# Patient Record
Sex: FEMALE | Race: WHITE | URBAN - METROPOLITAN AREA
[De-identification: names, ages, dates, MRNs, and addresses within clinical notes are randomized per-mention and may not be internally consistent; named-entity substitution may affect disease eponyms.]

---

## 2017-01-13 PROBLEM — C50.412 MALIGNANT NEOPLASM OF UPPER-OUTER QUADRANT OF LEFT FEMALE BREAST (HCC): Status: ACTIVE | Noted: 2017-01-13

## 2017-03-15 PROCEDURE — 81001 URINALYSIS AUTO W/SCOPE: CPT | Performed by: FAMILY MEDICINE

## 2017-03-16 PROCEDURE — 87086 URINE CULTURE/COLONY COUNT: CPT | Performed by: FAMILY MEDICINE

## 2017-06-06 PROBLEM — E78.2 MIXED HYPERLIPIDEMIA: Status: ACTIVE | Noted: 2017-06-06

## 2017-06-06 PROBLEM — R00.2 PALPITATION: Status: ACTIVE | Noted: 2017-06-06

## 2023-07-31 RX ORDER — ASCORBIC ACID 500 MG
500 TABLET ORAL DAILY
COMMUNITY

## 2023-07-31 RX ORDER — LORATADINE 10 MG/1
10 TABLET ORAL AS NEEDED
COMMUNITY

## 2023-07-31 RX ORDER — ACETAMINOPHEN 500 MG
500 TABLET ORAL EVERY 6 HOURS PRN
COMMUNITY

## 2023-08-03 ENCOUNTER — LAB ENCOUNTER (OUTPATIENT)
Dept: LAB | Age: 63
End: 2023-08-03
Attending: STUDENT IN AN ORGANIZED HEALTH CARE EDUCATION/TRAINING PROGRAM
Payer: COMMERCIAL

## 2023-08-03 DIAGNOSIS — Z01.818 PREOP TESTING: ICD-10-CM

## 2023-08-03 PROCEDURE — 86901 BLOOD TYPING SEROLOGIC RH(D): CPT

## 2023-08-03 PROCEDURE — 86900 BLOOD TYPING SEROLOGIC ABO: CPT

## 2023-08-03 PROCEDURE — 86850 RBC ANTIBODY SCREEN: CPT

## 2023-08-04 ENCOUNTER — HOSPITAL ENCOUNTER (OUTPATIENT)
Facility: HOSPITAL | Age: 63
Discharge: NURSING, REHAB OR LONG-TERM CARE | End: 2023-08-11
Attending: STUDENT IN AN ORGANIZED HEALTH CARE EDUCATION/TRAINING PROGRAM | Admitting: STUDENT IN AN ORGANIZED HEALTH CARE EDUCATION/TRAINING PROGRAM
Payer: COMMERCIAL

## 2023-08-04 ENCOUNTER — APPOINTMENT (OUTPATIENT)
Dept: GENERAL RADIOLOGY | Facility: HOSPITAL | Age: 63
End: 2023-08-04
Attending: STUDENT IN AN ORGANIZED HEALTH CARE EDUCATION/TRAINING PROGRAM
Payer: COMMERCIAL

## 2023-08-04 ENCOUNTER — ANESTHESIA (OUTPATIENT)
Dept: SURGERY | Facility: HOSPITAL | Age: 63
End: 2023-08-04
Payer: COMMERCIAL

## 2023-08-04 ENCOUNTER — ANESTHESIA EVENT (OUTPATIENT)
Dept: SURGERY | Facility: HOSPITAL | Age: 63
End: 2023-08-04
Payer: COMMERCIAL

## 2023-08-04 DIAGNOSIS — Z01.818 PREOP TESTING: Primary | ICD-10-CM

## 2023-08-04 LAB
ANION GAP SERPL CALC-SCNC: 4 MMOL/L (ref 0–18)
ANTIBODY SCREEN: NEGATIVE
BUN BLD-MCNC: 15 MG/DL (ref 7–18)
BUN/CREAT SERPL: 24.6 (ref 10–20)
CALCIUM BLD-MCNC: 9 MG/DL (ref 8.5–10.1)
CHLORIDE SERPL-SCNC: 111 MMOL/L (ref 98–112)
CO2 SERPL-SCNC: 26 MMOL/L (ref 21–32)
CREAT BLD-MCNC: 0.61 MG/DL
EGFRCR SERPLBLD CKD-EPI 2021: 100 ML/MIN/1.73M2 (ref 60–?)
EST. AVERAGE GLUCOSE BLD GHB EST-MCNC: 134 MG/DL (ref 68–126)
GLUCOSE BLD-MCNC: 161 MG/DL (ref 70–99)
GLUCOSE BLDC GLUCOMTR-MCNC: 118 MG/DL (ref 70–99)
GLUCOSE BLDC GLUCOMTR-MCNC: 138 MG/DL (ref 70–99)
GLUCOSE BLDC GLUCOMTR-MCNC: 156 MG/DL (ref 70–99)
GLUCOSE BLDC GLUCOMTR-MCNC: 180 MG/DL (ref 70–99)
GLUCOSE BLDC GLUCOMTR-MCNC: 210 MG/DL (ref 70–99)
HBA1C MFR BLD: 6.3 % (ref ?–5.7)
HCT VFR BLD AUTO: 36.4 %
HGB BLD-MCNC: 11.7 G/DL
OSMOLALITY SERPL CALC.SUM OF ELEC: 296 MOSM/KG (ref 275–295)
POTASSIUM SERPL-SCNC: 4.2 MMOL/L (ref 3.5–5.1)
RH BLOOD TYPE: POSITIVE
RH BLOOD TYPE: POSITIVE
SODIUM SERPL-SCNC: 141 MMOL/L (ref 136–145)

## 2023-08-04 PROCEDURE — 85014 HEMATOCRIT: CPT | Performed by: STUDENT IN AN ORGANIZED HEALTH CARE EDUCATION/TRAINING PROGRAM

## 2023-08-04 PROCEDURE — 82962 GLUCOSE BLOOD TEST: CPT

## 2023-08-04 PROCEDURE — 88311 DECALCIFY TISSUE: CPT | Performed by: STUDENT IN AN ORGANIZED HEALTH CARE EDUCATION/TRAINING PROGRAM

## 2023-08-04 PROCEDURE — 88305 TISSUE EXAM BY PATHOLOGIST: CPT | Performed by: STUDENT IN AN ORGANIZED HEALTH CARE EDUCATION/TRAINING PROGRAM

## 2023-08-04 PROCEDURE — 83036 HEMOGLOBIN GLYCOSYLATED A1C: CPT | Performed by: STUDENT IN AN ORGANIZED HEALTH CARE EDUCATION/TRAINING PROGRAM

## 2023-08-04 PROCEDURE — 76000 FLUOROSCOPY <1 HR PHYS/QHP: CPT | Performed by: STUDENT IN AN ORGANIZED HEALTH CARE EDUCATION/TRAINING PROGRAM

## 2023-08-04 PROCEDURE — 94660 CPAP INITIATION&MGMT: CPT

## 2023-08-04 PROCEDURE — 72170 X-RAY EXAM OF PELVIS: CPT | Performed by: STUDENT IN AN ORGANIZED HEALTH CARE EDUCATION/TRAINING PROGRAM

## 2023-08-04 PROCEDURE — 80048 BASIC METABOLIC PNL TOTAL CA: CPT | Performed by: STUDENT IN AN ORGANIZED HEALTH CARE EDUCATION/TRAINING PROGRAM

## 2023-08-04 PROCEDURE — 0SR904A REPLACEMENT OF RIGHT HIP JOINT WITH CERAMIC ON POLYETHYLENE SYNTHETIC SUBSTITUTE, UNCEMENTED, OPEN APPROACH: ICD-10-PCS | Performed by: STUDENT IN AN ORGANIZED HEALTH CARE EDUCATION/TRAINING PROGRAM

## 2023-08-04 PROCEDURE — 85018 HEMOGLOBIN: CPT | Performed by: STUDENT IN AN ORGANIZED HEALTH CARE EDUCATION/TRAINING PROGRAM

## 2023-08-04 DEVICE — PINNACLE GRIPTION ACETABULAR SHELL SECTOR 52MM OD
Type: IMPLANTABLE DEVICE | Site: HIP | Status: FUNCTIONAL
Brand: PINNACLE GRIPTION

## 2023-08-04 DEVICE — CERAMIC HEAD UPCHARGE: Type: IMPLANTABLE DEVICE

## 2023-08-04 DEVICE — BIOLOX DELTA CERAMIC FEMORAL HEAD +1.5 36MM DIA 12/14 TAPER
Type: IMPLANTABLE DEVICE | Site: HIP | Status: FUNCTIONAL
Brand: BIOLOX DELTA

## 2023-08-04 DEVICE — PINNACLE HIP SOLUTIONS ALTRX POLYETHYLENE ACETABULAR LINER NEUTRAL 36MM ID 52MM OD
Type: IMPLANTABLE DEVICE | Site: HIP | Status: FUNCTIONAL
Brand: PINNACLE ALTRX

## 2023-08-04 DEVICE — ACTIS DUOFIX HIP PROSTHESIS (FEMORAL STEM 12/14 TAPER CEMENTLESS SIZE 3 HIGH COLLAR)  CE
Type: IMPLANTABLE DEVICE | Site: HIP | Status: FUNCTIONAL
Brand: ACTIS

## 2023-08-04 DEVICE — PINNACLE CANCELLOUS BONE SCREW 6.5MM X 15MM
Type: IMPLANTABLE DEVICE | Site: HIP | Status: FUNCTIONAL
Brand: PINNACLE

## 2023-08-04 DEVICE — TOTAL HIP W/POR CUP & COCR HD: Type: IMPLANTABLE DEVICE

## 2023-08-04 DEVICE — PINNACLE CANCELLOUS BONE SCREW 6.5MM X 30MM
Type: IMPLANTABLE DEVICE | Site: HIP | Status: FUNCTIONAL
Brand: PINNACLE

## 2023-08-04 RX ORDER — DEXAMETHASONE SODIUM PHOSPHATE 4 MG/ML
VIAL (ML) INJECTION AS NEEDED
Status: DISCONTINUED | OUTPATIENT
Start: 2023-08-04 | End: 2023-08-04 | Stop reason: SURG

## 2023-08-04 RX ORDER — HYDROMORPHONE HYDROCHLORIDE 1 MG/ML
0.2 INJECTION, SOLUTION INTRAMUSCULAR; INTRAVENOUS; SUBCUTANEOUS EVERY 5 MIN PRN
Status: DISCONTINUED | OUTPATIENT
Start: 2023-08-04 | End: 2023-08-04 | Stop reason: HOSPADM

## 2023-08-04 RX ORDER — PROCHLORPERAZINE EDISYLATE 5 MG/ML
5 INJECTION INTRAMUSCULAR; INTRAVENOUS EVERY 8 HOURS PRN
Status: DISCONTINUED | OUTPATIENT
Start: 2023-08-04 | End: 2023-08-04 | Stop reason: HOSPADM

## 2023-08-04 RX ORDER — CEPHALEXIN 500 MG/1
500 CAPSULE ORAL EVERY 6 HOURS SCHEDULED
Status: DISCONTINUED | OUTPATIENT
Start: 2023-08-05 | End: 2023-08-11

## 2023-08-04 RX ORDER — SODIUM CHLORIDE, SODIUM LACTATE, POTASSIUM CHLORIDE, CALCIUM CHLORIDE 600; 310; 30; 20 MG/100ML; MG/100ML; MG/100ML; MG/100ML
INJECTION, SOLUTION INTRAVENOUS CONTINUOUS
Status: DISCONTINUED | OUTPATIENT
Start: 2023-08-04 | End: 2023-08-05

## 2023-08-04 RX ORDER — MORPHINE SULFATE 4 MG/ML
4 INJECTION, SOLUTION INTRAMUSCULAR; INTRAVENOUS EVERY 10 MIN PRN
Status: DISCONTINUED | OUTPATIENT
Start: 2023-08-04 | End: 2023-08-04 | Stop reason: HOSPADM

## 2023-08-04 RX ORDER — EPHEDRINE SULFATE 50 MG/ML
INJECTION INTRAVENOUS AS NEEDED
Status: DISCONTINUED | OUTPATIENT
Start: 2023-08-04 | End: 2023-08-04 | Stop reason: SURG

## 2023-08-04 RX ORDER — HYDROMORPHONE HYDROCHLORIDE 1 MG/ML
0.8 INJECTION, SOLUTION INTRAMUSCULAR; INTRAVENOUS; SUBCUTANEOUS EVERY 2 HOUR PRN
Status: DISCONTINUED | OUTPATIENT
Start: 2023-08-04 | End: 2023-08-06

## 2023-08-04 RX ORDER — FAMOTIDINE 20 MG/1
20 TABLET, FILM COATED ORAL ONCE
Status: DISCONTINUED | OUTPATIENT
Start: 2023-08-04 | End: 2023-08-04 | Stop reason: HOSPADM

## 2023-08-04 RX ORDER — CEFAZOLIN SODIUM/WATER 2 G/20 ML
2 SYRINGE (ML) INTRAVENOUS EVERY 8 HOURS
Status: COMPLETED | OUTPATIENT
Start: 2023-08-04 | End: 2023-08-05

## 2023-08-04 RX ORDER — LOSARTAN POTASSIUM 50 MG/1
50 TABLET ORAL DAILY
Status: DISCONTINUED | OUTPATIENT
Start: 2023-08-04 | End: 2023-08-11

## 2023-08-04 RX ORDER — ACETAMINOPHEN 500 MG
1000 TABLET ORAL EVERY 6 HOURS
Status: DISCONTINUED | OUTPATIENT
Start: 2023-08-04 | End: 2023-08-09

## 2023-08-04 RX ORDER — DEXTROSE MONOHYDRATE 25 G/50ML
50 INJECTION, SOLUTION INTRAVENOUS
Status: DISCONTINUED | OUTPATIENT
Start: 2023-08-04 | End: 2023-08-04 | Stop reason: HOSPADM

## 2023-08-04 RX ORDER — ONDANSETRON 2 MG/ML
4 INJECTION INTRAMUSCULAR; INTRAVENOUS EVERY 6 HOURS PRN
Status: DISCONTINUED | OUTPATIENT
Start: 2023-08-04 | End: 2023-08-11

## 2023-08-04 RX ORDER — SENNOSIDES 8.6 MG
17.2 TABLET ORAL NIGHTLY
Status: DISCONTINUED | OUTPATIENT
Start: 2023-08-04 | End: 2023-08-11

## 2023-08-04 RX ORDER — FAMOTIDINE 10 MG/ML
20 INJECTION, SOLUTION INTRAVENOUS 2 TIMES DAILY
Status: DISCONTINUED | OUTPATIENT
Start: 2023-08-04 | End: 2023-08-11

## 2023-08-04 RX ORDER — TRANEXAMIC ACID 10 MG/ML
INJECTION, SOLUTION INTRAVENOUS AS NEEDED
Status: DISCONTINUED | OUTPATIENT
Start: 2023-08-04 | End: 2023-08-04 | Stop reason: SURG

## 2023-08-04 RX ORDER — FAMOTIDINE 20 MG/1
20 TABLET, FILM COATED ORAL 2 TIMES DAILY
Status: DISCONTINUED | OUTPATIENT
Start: 2023-08-04 | End: 2023-08-11

## 2023-08-04 RX ORDER — ENEMA 19; 7 G/133ML; G/133ML
1 ENEMA RECTAL ONCE AS NEEDED
Status: DISCONTINUED | OUTPATIENT
Start: 2023-08-04 | End: 2023-08-11

## 2023-08-04 RX ORDER — KETOROLAC TROMETHAMINE 15 MG/ML
15 INJECTION, SOLUTION INTRAMUSCULAR; INTRAVENOUS EVERY 6 HOURS
Status: COMPLETED | OUTPATIENT
Start: 2023-08-04 | End: 2023-08-06

## 2023-08-04 RX ORDER — BUPIVACAINE HYDROCHLORIDE 7.5 MG/ML
INJECTION, SOLUTION INTRASPINAL
Status: COMPLETED | OUTPATIENT
Start: 2023-08-04 | End: 2023-08-04

## 2023-08-04 RX ORDER — POLYETHYLENE GLYCOL 3350 17 G/17G
17 POWDER, FOR SOLUTION ORAL DAILY PRN
Status: DISCONTINUED | OUTPATIENT
Start: 2023-08-04 | End: 2023-08-11

## 2023-08-04 RX ORDER — MORPHINE SULFATE 10 MG/ML
6 INJECTION, SOLUTION INTRAMUSCULAR; INTRAVENOUS EVERY 10 MIN PRN
Status: DISCONTINUED | OUTPATIENT
Start: 2023-08-04 | End: 2023-08-04 | Stop reason: HOSPADM

## 2023-08-04 RX ORDER — ACETAMINOPHEN 500 MG
1000 TABLET ORAL ONCE
Status: DISCONTINUED | OUTPATIENT
Start: 2023-08-04 | End: 2023-08-04

## 2023-08-04 RX ORDER — DIPHENHYDRAMINE HYDROCHLORIDE 50 MG/ML
25 INJECTION INTRAMUSCULAR; INTRAVENOUS ONCE AS NEEDED
Status: ACTIVE | OUTPATIENT
Start: 2023-08-04 | End: 2023-08-04

## 2023-08-04 RX ORDER — ACETAMINOPHEN 500 MG
1000 TABLET ORAL ONCE
Status: DISCONTINUED | OUTPATIENT
Start: 2023-08-04 | End: 2023-08-04 | Stop reason: HOSPADM

## 2023-08-04 RX ORDER — ONDANSETRON 2 MG/ML
INJECTION INTRAMUSCULAR; INTRAVENOUS AS NEEDED
Status: DISCONTINUED | OUTPATIENT
Start: 2023-08-04 | End: 2023-08-04 | Stop reason: SURG

## 2023-08-04 RX ORDER — HYDROMORPHONE HYDROCHLORIDE 1 MG/ML
0.4 INJECTION, SOLUTION INTRAMUSCULAR; INTRAVENOUS; SUBCUTANEOUS EVERY 2 HOUR PRN
Status: DISCONTINUED | OUTPATIENT
Start: 2023-08-04 | End: 2023-08-06

## 2023-08-04 RX ORDER — BISACODYL 10 MG
10 SUPPOSITORY, RECTAL RECTAL
Status: DISCONTINUED | OUTPATIENT
Start: 2023-08-04 | End: 2023-08-11

## 2023-08-04 RX ORDER — DIPHENHYDRAMINE HYDROCHLORIDE 50 MG/ML
12.5 INJECTION INTRAMUSCULAR; INTRAVENOUS EVERY 4 HOURS PRN
Status: DISCONTINUED | OUTPATIENT
Start: 2023-08-04 | End: 2023-08-11

## 2023-08-04 RX ORDER — DIPHENHYDRAMINE HCL 25 MG
25 CAPSULE ORAL EVERY 4 HOURS PRN
Status: DISCONTINUED | OUTPATIENT
Start: 2023-08-04 | End: 2023-08-11

## 2023-08-04 RX ORDER — NICOTINE POLACRILEX 4 MG
30 LOZENGE BUCCAL
Status: DISCONTINUED | OUTPATIENT
Start: 2023-08-04 | End: 2023-08-04 | Stop reason: HOSPADM

## 2023-08-04 RX ORDER — CELECOXIB 200 MG/1
400 CAPSULE ORAL ONCE
Status: COMPLETED | OUTPATIENT
Start: 2023-08-04 | End: 2023-08-04

## 2023-08-04 RX ORDER — ACETAMINOPHEN 500 MG
500 TABLET ORAL EVERY 4 HOURS PRN
Status: DISCONTINUED | OUTPATIENT
Start: 2023-08-04 | End: 2023-08-11

## 2023-08-04 RX ORDER — PROCHLORPERAZINE EDISYLATE 5 MG/ML
5 INJECTION INTRAMUSCULAR; INTRAVENOUS EVERY 8 HOURS PRN
Status: DISCONTINUED | OUTPATIENT
Start: 2023-08-04 | End: 2023-08-11

## 2023-08-04 RX ORDER — NICOTINE POLACRILEX 4 MG
15 LOZENGE BUCCAL
Status: DISCONTINUED | OUTPATIENT
Start: 2023-08-04 | End: 2023-08-04 | Stop reason: HOSPADM

## 2023-08-04 RX ORDER — NALOXONE HYDROCHLORIDE 0.4 MG/ML
80 INJECTION, SOLUTION INTRAMUSCULAR; INTRAVENOUS; SUBCUTANEOUS AS NEEDED
Status: DISCONTINUED | OUTPATIENT
Start: 2023-08-04 | End: 2023-08-04 | Stop reason: HOSPADM

## 2023-08-04 RX ORDER — DOCUSATE SODIUM 100 MG/1
100 CAPSULE, LIQUID FILLED ORAL 2 TIMES DAILY
Status: DISCONTINUED | OUTPATIENT
Start: 2023-08-04 | End: 2023-08-11

## 2023-08-04 RX ORDER — HYDROMORPHONE HYDROCHLORIDE 1 MG/ML
0.4 INJECTION, SOLUTION INTRAMUSCULAR; INTRAVENOUS; SUBCUTANEOUS EVERY 5 MIN PRN
Status: DISCONTINUED | OUTPATIENT
Start: 2023-08-04 | End: 2023-08-04 | Stop reason: HOSPADM

## 2023-08-04 RX ORDER — LIDOCAINE HYDROCHLORIDE 10 MG/ML
INJECTION, SOLUTION EPIDURAL; INFILTRATION; INTRACAUDAL; PERINEURAL AS NEEDED
Status: DISCONTINUED | OUTPATIENT
Start: 2023-08-04 | End: 2023-08-04 | Stop reason: SURG

## 2023-08-04 RX ORDER — LIDOCAINE HYDROCHLORIDE 10 MG/ML
INJECTION, SOLUTION INFILTRATION; PERINEURAL
Status: COMPLETED | OUTPATIENT
Start: 2023-08-04 | End: 2023-08-04

## 2023-08-04 RX ORDER — ANASTROZOLE 1 MG/1
1 TABLET ORAL DAILY
Status: DISCONTINUED | OUTPATIENT
Start: 2023-08-05 | End: 2023-08-08

## 2023-08-04 RX ORDER — CEFAZOLIN SODIUM/WATER 2 G/20 ML
2 SYRINGE (ML) INTRAVENOUS ONCE
Status: COMPLETED | OUTPATIENT
Start: 2023-08-04 | End: 2023-08-04

## 2023-08-04 RX ORDER — SODIUM CHLORIDE 9 MG/ML
INJECTION, SOLUTION INTRAVENOUS CONTINUOUS
Status: DISCONTINUED | OUTPATIENT
Start: 2023-08-04 | End: 2023-08-05

## 2023-08-04 RX ORDER — OXYCODONE HYDROCHLORIDE 5 MG/1
5 TABLET ORAL EVERY 4 HOURS PRN
Status: DISCONTINUED | OUTPATIENT
Start: 2023-08-04 | End: 2023-08-06

## 2023-08-04 RX ORDER — ROSUVASTATIN CALCIUM 20 MG/1
20 TABLET, COATED ORAL NIGHTLY
Status: DISCONTINUED | OUTPATIENT
Start: 2023-08-04 | End: 2023-08-11

## 2023-08-04 RX ORDER — ONDANSETRON 2 MG/ML
4 INJECTION INTRAMUSCULAR; INTRAVENOUS EVERY 6 HOURS PRN
Status: DISCONTINUED | OUTPATIENT
Start: 2023-08-04 | End: 2023-08-04 | Stop reason: HOSPADM

## 2023-08-04 RX ORDER — MORPHINE SULFATE 4 MG/ML
2 INJECTION, SOLUTION INTRAMUSCULAR; INTRAVENOUS EVERY 10 MIN PRN
Status: DISCONTINUED | OUTPATIENT
Start: 2023-08-04 | End: 2023-08-04 | Stop reason: HOSPADM

## 2023-08-04 RX ORDER — HYDROMORPHONE HYDROCHLORIDE 1 MG/ML
0.6 INJECTION, SOLUTION INTRAMUSCULAR; INTRAVENOUS; SUBCUTANEOUS EVERY 5 MIN PRN
Status: DISCONTINUED | OUTPATIENT
Start: 2023-08-04 | End: 2023-08-04 | Stop reason: HOSPADM

## 2023-08-04 RX ORDER — OXYCODONE HYDROCHLORIDE 5 MG/1
10 TABLET ORAL EVERY 4 HOURS PRN
Status: DISCONTINUED | OUTPATIENT
Start: 2023-08-04 | End: 2023-08-06

## 2023-08-04 RX ORDER — SODIUM CHLORIDE, SODIUM LACTATE, POTASSIUM CHLORIDE, CALCIUM CHLORIDE 600; 310; 30; 20 MG/100ML; MG/100ML; MG/100ML; MG/100ML
INJECTION, SOLUTION INTRAVENOUS CONTINUOUS
Status: DISCONTINUED | OUTPATIENT
Start: 2023-08-04 | End: 2023-08-04 | Stop reason: HOSPADM

## 2023-08-04 RX ORDER — LEVOTHYROXINE SODIUM 88 UG/1
88 TABLET ORAL EVERY MORNING
Status: DISCONTINUED | OUTPATIENT
Start: 2023-08-05 | End: 2023-08-11

## 2023-08-04 RX ADMIN — ONDANSETRON 4 MG: 2 INJECTION INTRAMUSCULAR; INTRAVENOUS at 11:13:00

## 2023-08-04 RX ADMIN — BUPIVACAINE HYDROCHLORIDE 1.5 ML: 7.5 INJECTION, SOLUTION INTRASPINAL at 11:13:00

## 2023-08-04 RX ADMIN — TRANEXAMIC ACID 1000 MG: 10 INJECTION, SOLUTION INTRAVENOUS at 11:42:00

## 2023-08-04 RX ADMIN — LIDOCAINE HYDROCHLORIDE 50 MG: 10 INJECTION, SOLUTION EPIDURAL; INFILTRATION; INTRACAUDAL; PERINEURAL at 11:13:00

## 2023-08-04 RX ADMIN — TRANEXAMIC ACID 1000 MG: 10 INJECTION, SOLUTION INTRAVENOUS at 13:18:00

## 2023-08-04 RX ADMIN — LIDOCAINE HYDROCHLORIDE 5 ML: 10 INJECTION, SOLUTION INFILTRATION; PERINEURAL at 11:13:00

## 2023-08-04 RX ADMIN — EPHEDRINE SULFATE 10 MG: 50 INJECTION INTRAVENOUS at 12:49:00

## 2023-08-04 RX ADMIN — SODIUM CHLORIDE, SODIUM LACTATE, POTASSIUM CHLORIDE, CALCIUM CHLORIDE: 600; 310; 30; 20 INJECTION, SOLUTION INTRAVENOUS at 11:13:00

## 2023-08-04 RX ADMIN — CEFAZOLIN SODIUM/WATER 2 G: 2 G/20 ML SYRINGE (ML) INTRAVENOUS at 11:42:00

## 2023-08-04 RX ADMIN — DEXAMETHASONE SODIUM PHOSPHATE 10 MG: 4 MG/ML VIAL (ML) INJECTION at 11:13:00

## 2023-08-04 NOTE — CM/SW NOTE
Per review of Dr. Ailyn Bright note from 6/20 pt wants SLADE as pt lives alone and has a lot of stairs. PT/OT eval pending. CM requested department  [facility]) to initiate [address] referral for [facility] and Isabellmaryu Josefina. SW/CM will continue to follow. Shonda Bright.  Wendy Bah RN, BSN  Nurse   [phone]

## 2023-08-04 NOTE — PHYSICAL THERAPY NOTE
PT orders received, chart reviewed. Attempted to see pt for PT, however pt occupied with RN staff at the time. Attempted later, pt in process of transfer to another room. Will attempt to see pt on 8/5/23. CM    Per RN, pt has been up ambulating in room and doing very well.

## 2023-08-04 NOTE — OPERATIVE REPORT
[facility] at [address]: 8/4/2023    PREOPERATIVE DIAGNOSIS:   Right Hip Osteoarthritis    POST-OPERATIVE DIAGNOSIS:   Righ Hip Osteoarthritis    PROCEDURE:  Right Total Hip Arthroplasty  Intra-operative Interpretation of Fluoroscopy    Location: [address]: Arash Meadows MD  Assistant(s): Mila Burton MD, Jeanie GARZON    Anesthesia type:  Spinal  Estimated Blood Loss: 300cc    Drains: None    Findings: Consistent with advanced degenerative joint disease    Specimen: Femoral head    Complications: None immediately apparent    Implants:  Acetabular Component: Depuy [place], size 52mm gription sector shell, 36mm neutral Polyethylene Liner, two x 6.5mm dome screws  Femoral Component: Press-fit size 3, high offset, Depuy Actis femoral component  Head: 36mm + 1.5 delta ceramic head. Indication: This is a 57year old lady, who presented with chronic hip pain refractory to non-operative treatment, and impairing activities of daily living. Radiographic evaluation demonstrated hip osteoarthritis . Surgical versus non-surgical options were discussed with the patient, and together we decided it is best to proceed with a total hip arthroplasty with the goals of pain relief and improvement in function. All risks and benefits of surgery including bleeding, infection, dislocation, amy-prosthetic fracture, neurovascular injury, leg length or offset discrepancy, as well as medical and anesthesia-related complications were discussed with the patient / family, who elected to proceed with surgery. Procedure in detail:    The patient was brought to the operating room, and underwent the above anesthesia. The patient was placed in a supine position with both feet secured in boots on the [place] table. The operative hip was sterilely prepped and draped in a usual orthopedic fashion.       A surgical pause was conducted to reconfirm the correct patient, site, side, and procedure to be performed. Perioperative antibiotics were given within one hour prior to the procedure. An approximately 8cm long incision was made beginning proximally 2 cm lateral and 2 cm distal to the anterior-superior iliac spine and extending distally toward the ipsilateral lateral epicondyle of the knee. Electrocautery was used to dissect through the subcutaneous tissue. The fascia overlying the TFL was incised in line with its fibers. A Lowery elevator was used to separate the fascia from the TFL. A finger then was placed along the superior femoral neck, and a cobra retractor was placed. A cerebellar retractor was placed distally between the rectus and the tensor muscle. Using electrocautery and a Schnidt tonsil clamp, the ascending branches of the lateral circumflex artery were ligated. The investing fascia over the capsule was divided, and another cobra retractor was placed inferior to the femoral neck. The pericapsular fat was then excised from around the anterior hip capsule. The anterior hip capsule was then incised with electrocautery starting at the edge of the acetabulum in line with the anterosuperior edge of the femoral neck and extending distally to the anterior intertrochanteric line, dividing the capsule at about a 135-degree angle. The medial and lateral capsule flaps were elevated off the of the proximal femur intertrochanteric line, and No. 5 Ethibond tagging sutures were placed in the both flaps of the capsule. A Hohmann retractor was placed posterosuperiorly over the acetabulum and the capsule was elevated a few millimeters off of the acetabular rim and ilium. The hip was externally rotated to 90 degrees, allowing an inferomedial split of the capsule. The hip was rotated back and a double pronged retractor was placed inferomedially instead of the cobra. The superior cobra was placed inside the capsule.  A kimberlee was made on the femoral neck in line with the planned femoral neck resection. A reciprocating saw was used to complete the femoral neck osteotomy. The hip was externally rotated to 70 degrees, and traction was applied. A spiral femoral hip screw was placed in the femoral head, and the femoral head was removed from the wound. A No. 1 retractor over the anterior wall of the acetabulum and the No. 2 retractor was placed postero-superiorly. This exposed the acetabulum. We then used electrocautery to excise the labrum and pulvinar. Fluoroscopy was utilized to obtain a perfect AP pelvis radiograph. We then started with a 51-mm reamer, and sequentially expanded the socket. The last reamer was a 52-mm reamer. Once appropriate reaming and positioning of the cup was determined with fluoroscopy, we then placed a final 52 mm acetabular shell in the appropriate degree of anteversion and abduction. There was an excellent press fit. Supplemental 6.5mm cancellous bone screws were drilled, measured and placed. The final  36 mm neutral polyethylene acetabular liner was impacted into place without difficulty. The liner was checked and noted to be well seated. Traction was released, and then we turned our attention to the femur. The femoral hook from the [place] [address] bed was placed around the vastus ridge. The leg was externally rotated 120 degrees. An asymmetric double pronged retractor was placed distally medially over the calcar, and the leg was then extended and adducted. A #1 retractor was placed posteriorly between the superolateral capsule and the gluteus minimus. The lateral capsule was then released off the femur, allowing for improved elevation of the femur. The conjoined was released but the obturator externus and piriformis tendons were not released. The hook was elevated, a double pronged retractor was placed over the greater trochanter and appropriate exposure of the femur was achieved. A box osteotome was used to identify the starting location.  A rongeur was used to remove posterolateral cortical bone to allow neutral broaching. The femoral canal was then broached sequentially to a size 3 stem. The broach was left in place, and a high offset neck with a 36-mm +1.5 trial head were placed. The leg was abducted and raised, and the hip was then reduced. Fluoroscopic imaging demonstrated appropriate leg length and offset. The hip was found to have appropriate soft tissue tension. Hip range of motion was tested and noted to be stable throughout. We then elected to implant these components. The hip was re-dislocated. The trial components were removed. The femur was exposed again, and the final size 3, high offset stem was impacted into place. The calcar was checked and noted to be intact without any evidence of fracture. A 36 mm + 1.5 femoral head was retrialed with appropriate soft tissue tension. This was the minimum length necessary for adequate stability. The final 36 mm + 1.5  femoral head was then impacted onto a cleaned/dried trunnion. The hip was then reduced. The wound was thoroughly irrigated with dilute betadine solution followed by normal saline. Pain cocktail injection was delivered to the soft tissues. The capsule was closed with No. 1 Vicryl interrupted sutures. The fascia over the TFL was closed with a running Quill suture. The subcutaneous tissues were then closed with 2-0 vicryl. The skin was closed with a running 3-0 monocryl suture. Dermabond was applied to the skin edges and a dry sterile dressing was placed. The patient was awakened from anesthesia and taken to the PACU in stable condition. There were no immediate complications. There was no qualified resident available to assist because qualified residents were assisting with other surgery. The PA assistant was necessary for help with positioning, draping, retraction, and wound closure.      POST-OPERATIVE PLAN  The patient will be permitted weight bearing as tolerated on the operative extremity  The patient will be permitted range of motion as tolerated  The patient will receive 24 hours of perioperative antibiotics. Venous thromboembolic prophylaxis will consist of early mobilization, mechanical compressive devices, and Eliquis  The dressing may be removed at 12 days post-operatively  The patient should be scheduled for follow up in 2 weeks for wound and radiographic evaluation.

## 2023-08-04 NOTE — ANESTHESIA PROCEDURE NOTES
Spinal Block    Date/Time: 8/4/2023 11:13 AM    Performed by: Rigoberto Diaz CRNA  Authorized by: Rigoberto Diaz CRNA      General Information and Staff    Start Time:  8/4/2023 11:13 AM  End Time:  8/4/2023 11:22 AM  CRNA:  Rigoberto Diaz CRNA  Performed by:  CRNA  Patient Location:  OR  Site identification: surface landmarks    Reason for Block: at surgeon's request and surgical anesthesia    Preanesthetic Checklist: patient identified, IV checked, risks and benefits discussed, monitors and equipment checked, pre-op evaluation, timeout performed, anesthesia consent and sterile technique used      Procedure Details    Patient Position:  Sitting  Prep: ChloraPrep and patient draped    Monitoring:  Continuous pulse ox and cardiac monitor  Approach:  Midline  Location:  L4-5  Injection Technique:  Single-shot    Needle    Needle Type:  Pencil-tip  Needle Gauge:  24 G    Assessment    Sensory Level:  T10  Events: clear CSF, CSF aspirated and well tolerated      Additional Comments

## 2023-08-04 NOTE — DISCHARGE INSTRUCTIONS
Resume anastrazole on 8/11. DISCHARGE INSTRUCTIONS:  PLACE ICE TO SURGICAL AREA 20-30 MINUTES ON/ 20-30 MINUTES OFF. THIS IS TO HELP WITH PAIN & SWELLING. TAKE YOUR MEDICATIONS AS PRESCRIBED BY YOUR DOCTOR BELOW. THESE HAVE BEEN SENT TO YOUR PHARMACY. IF YOU WERE INSTRUCTED BY PHYSICAL THERAPY TO EXERCISE HIP PRECAUTIONS, CONTINUE TO DO SO AFTER DISCHARGE    IT IS OK TO BEAR WEIGHT AS TOLERATED ON THE OPERATIVE EXTREMITY. CALL TO CONFIRM YOUR FOLLOW UP APPOINTMENT WITH DR. Keith Harrison TO BE SEEN IN 2-3 WEEKS POSTOP. [phone]    MEDICATIONS    POST OP MEDICATION REGIMEN              MULTI-MODAL   PAIN REGIMEN     DRUG   FOR   FREQUENCY & DURATION   QTY   NOTES     WEANING  TIPS       Gabapentin 100mg   Nerve pain   Take 2 tabs every 8 hours for 14 days    90   No refills You may discontinue this medication prior to 14 days if you do not tolerate it. Tramadol 50mg     Moderate pain   Take 1-2 tabs every 6 hours only as needed   45 May prescribe a refill, but ideally, this should be tapered off after surgery Stop using this medication 2nd   As pain decreases over time, increase the interval between doses (6 hours to 8, then 12, then 24) to taper off the medication. Meloxicam 15mg     Inflammation   Take 1 tab daily for 30 days     30   May refill if needed beyond 30 days   Recommend completing the 30 day supply. BREAKTHROUGH PAIN         Norco 5-325mg       Severe pain     Take 1 tabs every 4-6 hours only as needed for severe pain       40   Take at least 1 hr apart from Tramadol. Ideally, no refill. The goal is to taper off this medication as soon as possible, as it can be addictive and have side effects. Stop using this medication 1st if no longer having severe pain. BLOOD THINNER     Eliquis 2.5mg   Blood clot prevention   Take 1 tab twice daily for 30 days     60     No refills   Complete the entire course of your blood thinner.          ANTIBIOTIC Cefadroxil 500mg       Infection prevention     Take 1 tab twice daily for 7 days     14     No refills   Complete the entire course of your prophylactic antibiotic. STOOL SOFTENER     Sennokot 8.6/50mg   Constipation   Take 1 tab twice daily  while on opioids     60 Refer to discharge instructions if constipation persists even after taking this medication   Stop taking if having diarrhea or loose stools. ANTI-NAUSEA   Ondansetron 4mg   Nausea   Take 1 tab every 8 hours as needed if nauseous     20   No Refill      ANTI-ACID REFLUX / GASTRITIS   Pantoprazole 40mg   Acid reflux, gastric ulcer prophylaxis   Take 1 tab every day along with Meloxicam     60   May refill if Meloxicam refilled        DRESSING:    YOU HAVE A SPECIAL DRESSING CALLED AN AQUACEL DRESSING OVER YOUR INCISION. THE DRESSING STAYS FOR 12 DAYS FROM SURGERY. YOU MAY SHOWER AS SOON AS YOU RETURN HOME WITH THE AQUACEL DRESSING INTACT OVER YOUR INCISION AREA. IF THE DRESSING LEAKS OR COMES LOOSE BEFORE THE 7 DAY PERIOD CONTACT YOUR DOCTOR / SURGEON. AFTER   12 DAYS THE DRESSING IS REMOVED BY PULLING ON THE EDGE OF THE DRESSING AND GENTLY STRETCHING IT, THEN PEEL IT OFF SLOWLY LIKE A BANDAID. IF YOU HAVE ANY QUESTIONS OR CONCERNS ABOUT THE DRESSING CONTACT YOUR DOCTOR. IF DRAINAGE IS PRESENT AT ANYTIME FROM YOUR DRESSING OR FROM YOUR INCISION CALL YOUR DOCTOR / SURGEON AS SOON AS POSSIBLE. REASONS TO CALL YOUR DOCTOR:  TEMPERATURE .0 OR MORE  PERSISTANT NAUSEA OR VOMITTING - ESPECIALLY IF YOU ARE UNABLE TO EAT OR DRINK. INCREASED LEVEL OF PAIN OR PAIN WHICH IS NOT CONTROLLED BY YOUR PAIN MEDICINE. PERSISTENT DRAINAGE AT ANYTIME FROM YOUR SURGICAL AREA / INCISION. PAIN, TENDERNESS OR SUDDEN SWELLING IN YOUR CALF REGION OF THE LOWER EXTREMITIES - THIS IS A POSSIBLE SIGN OF A BLOOD CLOT. ANY CHANGES IN SENSATION IN YOUR BODY IN THE AREA OF YOUR SURGERY = NUMBNESS OR TINGLING.   ANY CHANGES IN CIRCULATION IN YOUR BODY IN THE AREA OF YOUR SURGERY = CHANGES  IN COLOR EITHER DARKER OR VERY PALE; OR  IF AREA FEELS COLD TO THE TOUCH AS COMPARED TO OTHER AREAS. ANY CHANGES IN FUNCTION IN YOUR BODY IN THE AREA OF YOUR SURGERY = CHANGE IN MOVEMENT - UNABLE TO MOVE OR WIGGLE FINGERS, TOES OR FOOT OR ANY OTHER CHANGES IN NORMAL BODY FUNCTIONING. FOR ANY OF THE ABOVE OR ANY OTHER QUESTIONS OR CONCERNS CALL YOUR DOCTOR/ SURGEON AS SOON AS POSSIBLE.       Travis Landau, MD MPH  Orthopaedic Surgeon, Adult Hip and Knee Reconstruction  [facility] at [facility] [address] [facility]  [place], [address] [place] [address]  Office: (L) [phone] (E) [phone]  Adminstrative Assistant: Jayant Mar

## 2023-08-05 LAB
ANION GAP SERPL CALC-SCNC: 6 MMOL/L (ref 0–18)
BUN BLD-MCNC: 14 MG/DL (ref 7–18)
BUN/CREAT SERPL: 24.1 (ref 10–20)
CALCIUM BLD-MCNC: 9.5 MG/DL (ref 8.5–10.1)
CHLORIDE SERPL-SCNC: 110 MMOL/L (ref 98–112)
CO2 SERPL-SCNC: 26 MMOL/L (ref 21–32)
CREAT BLD-MCNC: 0.58 MG/DL
DEPRECATED RDW RBC AUTO: 43.3 FL (ref 35.1–46.3)
EGFRCR SERPLBLD CKD-EPI 2021: 101 ML/MIN/1.73M2 (ref 60–?)
ERYTHROCYTE [DISTWIDTH] IN BLOOD BY AUTOMATED COUNT: 12.8 % (ref 11–15)
GLUCOSE BLD-MCNC: 160 MG/DL (ref 70–99)
GLUCOSE BLDC GLUCOMTR-MCNC: 133 MG/DL (ref 70–99)
GLUCOSE BLDC GLUCOMTR-MCNC: 134 MG/DL (ref 70–99)
GLUCOSE BLDC GLUCOMTR-MCNC: 151 MG/DL (ref 70–99)
GLUCOSE BLDC GLUCOMTR-MCNC: 157 MG/DL (ref 70–99)
HCT VFR BLD AUTO: 32.8 %
HGB BLD-MCNC: 10.7 G/DL
MCH RBC QN AUTO: 29.8 PG (ref 26–34)
MCHC RBC AUTO-ENTMCNC: 32.6 G/DL (ref 31–37)
MCV RBC AUTO: 91.4 FL
OSMOLALITY SERPL CALC.SUM OF ELEC: 298 MOSM/KG (ref 275–295)
PLATELET # BLD AUTO: 284 10(3)UL (ref 150–450)
POTASSIUM SERPL-SCNC: 4.3 MMOL/L (ref 3.5–5.1)
RBC # BLD AUTO: 3.59 X10(6)UL
SODIUM SERPL-SCNC: 142 MMOL/L (ref 136–145)
WBC # BLD AUTO: 11.9 X10(3) UL (ref 4–11)

## 2023-08-05 PROCEDURE — 97161 PT EVAL LOW COMPLEX 20 MIN: CPT

## 2023-08-05 PROCEDURE — 97530 THERAPEUTIC ACTIVITIES: CPT

## 2023-08-05 PROCEDURE — 82962 GLUCOSE BLOOD TEST: CPT

## 2023-08-05 PROCEDURE — 94799 UNLISTED PULMONARY SVC/PX: CPT

## 2023-08-05 PROCEDURE — 36415 COLL VENOUS BLD VENIPUNCTURE: CPT | Performed by: INTERNAL MEDICINE

## 2023-08-05 PROCEDURE — 80048 BASIC METABOLIC PNL TOTAL CA: CPT | Performed by: INTERNAL MEDICINE

## 2023-08-05 PROCEDURE — 85027 COMPLETE CBC AUTOMATED: CPT | Performed by: STUDENT IN AN ORGANIZED HEALTH CARE EDUCATION/TRAINING PROGRAM

## 2023-08-05 PROCEDURE — 97116 GAIT TRAINING THERAPY: CPT

## 2023-08-05 PROCEDURE — 97165 OT EVAL LOW COMPLEX 30 MIN: CPT

## 2023-08-05 PROCEDURE — 97535 SELF CARE MNGMENT TRAINING: CPT

## 2023-08-05 RX ORDER — OXYCODONE HYDROCHLORIDE 5 MG/1
5 TABLET ORAL EVERY 4 HOURS PRN
Qty: 40 TABLET | Refills: 0 | Status: SHIPPED | OUTPATIENT
Start: 2023-08-05 | End: 2023-08-06

## 2023-08-05 RX ORDER — TRAMADOL HYDROCHLORIDE 50 MG/1
TABLET ORAL EVERY 4 HOURS PRN
Qty: 40 TABLET | Refills: 0 | Status: SHIPPED | OUTPATIENT
Start: 2023-08-05

## 2023-08-05 NOTE — PHYSICAL THERAPY NOTE
PHYSICAL THERAPY EVALUATION - INPATIENT     Room Number: 433/433-A  Evaluation Date: 8/5/2023  Type of Evaluation: Initial   Physician Order: PT Eval and Treat    Presenting Problem: R ant hip  Reason for Therapy: Mobility Dysfunction and Discharge Planning    PHYSICAL THERAPY ASSESSMENT   Orders received and chart reviewed. RN approved participation in physical therapy. PPE worn by therapist: mask and gloves. Patient was wearing a mask during session. Patient is a 57year old female admitted 8/4/2023 for Presenting Problem: R ant hip. Patient presented in bedside chair with 4/10 pain. Education provided on Physical therapy plan of care, physiological benefits of out of bed mobility, and therex per post op HEP, gait training . Patient with fair carryover. Pt is very talkative and fearful about her postop recovery. Bed mobility: Min assist  Transfers: Min assist  Gait Assistance: Contact guard assist (valgus gait RLE)  Distance (ft):  (20x2) pt able to ambulate to/from bathroom and transfer off toilet  Assistive Device: Rolling walker             Patient was left in bedside chair at end of session with all needs in reach. Patient's current functional deficits include endurance. The patient's Approx Degree of Impairment: 50.57% has been calculated based on documentation in the [facility] '6 clicks' Inpatient Basic Mobility Short Form. Research supports that patients with this level of impairment may benefit from Subacute Rehab. RN aware of patient status post session. Patient will benefit from continued IP PT services to address these deficits in preparation for discharge. DISCHARGE RECOMMENDATIONS  PT Discharge Recommendations: Sub-acute rehabilitation    PLAN  PT Treatment Plan: Bed mobility; Family education;Gait training;Patient education;Transfer training  Rehab Potential : Good  Frequency (Obs): Daily       PHYSICAL THERAPY MEDICAL/SOCIAL HISTORY     History related to current admission:      Problem List  Active Problems:    Preop testing      Past Medical History  Past Medical History:   Diagnosis Date    Back problem     Breast cancer ([facility] [address]     left side    Depression     Diabetes ([address]     Essential hypertension     Exposure to medical diagnostic radiation     Glaucoma     Hyperthyroidism     IBS (irritable bowel syndrome)     Mixed hyperlipidemia 2017    Osteoarthritis     Palpitation 2017    Personal history of antineoplastic chemotherapy     PONV (postoperative nausea and vomiting)     Precordial pain     Sleep apnea     CPAP    Visual impairment     readers       Past Surgical History  Past Surgical History:   Procedure Laterality Date    BREAST BIOPSY Left     benign    CHOLECYSTECTOMY      COLONOSCOPY      MASTECTOMY MODIFIED RADICAL Bilateral     OTHER SURGICAL HISTORY      port was removed. HOME SITUATION  Type of Home: House   Home Layout: Multi-level  Stairs to Enter : 5  Railing: No  Stairs to Bedroom:  (10)     Lives With: Alone  Drives: Yes          Prior Level of [place]: ind    SUBJECTIVE  \"I need to try to walk\"    PHYSICAL THERAPY EXAMINATION     OBJECTIVE  Precautions: None (no hip precautions per ortho)  Fall Risk: Standard fall risk    WEIGHT BEARING RESTRICTION  Weight Bearing Restriction: R lower extremity        R Lower Extremity: Weight Bearing as Tolerated       PAIN ASSESSMENT  Ratin  Location:  (R hip)  Management Techniques: Repositioning    COGNITION  Overall Cognitive Status:  WFL - within functional limits    RANGE OF MOTION AND STRENGTH ASSESSMENT  Upper extremity ROM and strength are within functional limits     Lower extremity ROM is within functional limits     Lower extremity strength is within functional limits     BALANCE  Static Sitting: Fair  Dynamic Sitting: Fair  Static Standing: Fair -  Dynamic Standing: Poor +    A  AM-PAC '6-Clicks' INPATIENT SHORT FORM - BASIC MOBILITY  How much difficulty does the patient currently have. ..   Patient Difficulty: Turning over in bed (including adjusting bedclothes, sheets and blankets)?: A Little   Patient Difficulty: Sitting down on and standing up from a chair with arms (e.g., wheelchair, bedside commode, etc.): A Little   Patient Difficulty: Moving from lying on back to sitting on the side of the bed?: A Little   How much help from another person does the patient currently need. .. Help from Another: Moving to and from a bed to a chair (including a wheelchair)?: A Little   Help from Another: Need to walk in hospital room?: A Little   Help from Another: Climbing 3-5 steps with a railing?: A Lot     AM-PAC Score:  Raw Score: 17   Approx Degree of Impairment: 50.57%   Standardized Score (AM-PAC Scale): 42.13   CMS Modifier (G-Code): CK      Exercise/Education Provided:  Bed mobility  Gait training  Lower therapeutic exercise: Ankle pumps  Glut sets  Hip AB/AD  LAQ  Quad sets  Transfer training    Patient End of Session: Up in chair    CURRENT GOALS    Goals to be met by: 8/19  Patient Goal Patient's self-stated goal is: to go home   Goal #1 Patient is able to demonstrate supine - sit EOB @ level: supervision     Goal #1   Current Status    Goal #2 Patient is able to demonstrate transfers Sit to/from Stand at assistance level: supervision with walker - rolling     Goal #2  Current Status    Goal #3 Patient is able to ambulate 150 feet with assist device: walker - rolling at assistance level: supervision   Goal #3   Current Status    Goal #4 Patient will negotiate 3 stairs/one curb w/ assistive device and supervision   Goal #4   Current Status    Goal #5 Patient to demonstrate independence with home activity/exercise instructions provided to patient in preparation for discharge.    Goal #5   Current Status    Goal #6    Goal #6  Current Status      Patient Evaluation Complexity Level:  History Low - no personal factors and/or co-morbidities   Examination of body systems Low - addressing 1-2 elements   Clinical Presentation Low - Stable   Clinical Decision Making Low Complexity       Gait Training: 10 minutes  Therapeutic Activity: 10 minutes  Therapeutic Exercise: 5 minutes

## 2023-08-05 NOTE — CM/SW NOTE
PT recommendation: SNF, pt agreeable and would like to use [phone] zip code, unsure of [facility]. SW uploaded referral via [employer]. Will need facility acceptance, PASRR, and insurance auth. SW/CM to remain available for support and/or discharge planning.       DEAN Stock, [facility]  [employer] [facility]   IKF:#[id number]

## 2023-08-05 NOTE — PLAN OF CARE
Problem: Patient Centered Care  Goal: Patient preferences are identified and integrated in the patient's plan of care  Description: Interventions:  - What would you like us to know as we care for you? I live home alone  - Provide timely, complete, and accurate information to patient/family  - Incorporate patient and family knowledge, values, beliefs, and cultural backgrounds into the planning and delivery of care  - Encourage patient/family to participate in care and decision-making at the level they choose  - Honor patient and family perspectives and choices  Outcome: Progressing     Problem: Patient/Family Goals  Goal: Patient/Family Long Term Goal  Description: Patient's Long Term Goal: to regain strength    Interventions:  - rehab  - See additional Care Plan goals for specific interventions  Outcome: Progressing  Goal: Patient/Family Short Term Goal  Description: Patient's Short Term Goal: pain management     Interventions:   - toradol, tylenol  - See additional Care Plan goals for specific interventions  Outcome: Progressing     Problem: PAIN - ADULT  Goal: Verbalizes/displays adequate comfort level or patient's stated pain goal  Description: INTERVENTIONS:  - Encourage pt to monitor pain and request assistance  - Assess pain using appropriate pain scale  - Administer analgesics based on type and severity of pain and evaluate response  - Implement non-pharmacological measures as appropriate and evaluate response  - Consider cultural and social influences on pain and pain management  - Manage/alleviate anxiety  - Utilize distraction and/or relaxation techniques  - Monitor for opioid side effects  - Notify MD/LIP if interventions unsuccessful or patient reports new pain  - Anticipate increased pain with activity and pre-medicate as appropriate  Outcome: Progressing   POD 1. Patient a&ox4. RA and CPAP at night. SR on remote tele. Right hip dressing CDI. Up with assist and walker.  Awaiting PT eval. Scheduled tylenol and toradol as ordered for pain management. All needs met at this time.

## 2023-08-05 NOTE — PLAN OF CARE
Problem: PAIN - ADULT  Goal: Verbalizes/displays adequate comfort level or patient's stated pain goal  Description: INTERVENTIONS:  - Encourage pt to monitor pain and request assistance  - Assess pain using appropriate pain scale  - Administer analgesics based on type and severity of pain and evaluate response  - Implement non-pharmacological measures as appropriate and evaluate response  - Consider cultural and social influences on pain and pain management  - Manage/alleviate anxiety  - Utilize distraction and/or relaxation techniques  - Monitor for opioid side effects  - Notify MD/LIP if interventions unsuccessful or patient reports new pain  - Anticipate increased pain with activity and pre-medicate as appropriate  8/5/2023 1222 by Gya Villaseñor, RN  Outcome: Progressing  8/5/2023 1221 by Gay Villaseñor, RN  Outcome: Progressing     Problem: SAFETY ADULT - FALL  Goal: Free from fall injury  Description: INTERVENTIONS:  - Assess pt frequently for physical needs  - Identify cognitive and physical deficits and behaviors that affect risk of falls.   - [place] fall precautions as indicated by assessment.  - Educate pt/family on patient safety including physical limitations  - Instruct pt to call for assistance with activity based on assessment  - Modify environment to reduce risk of injury  - Provide assistive devices as appropriate  - Consider OT/PT consult to assist with strengthening/mobility  - Encourage toileting schedule  Outcome: Progressing     Problem: DISCHARGE PLANNING  Goal: Discharge to home or other facility with appropriate resources  Description: INTERVENTIONS:  - Identify barriers to discharge w/pt and caregiver  - Include patient/family/discharge partner in discharge planning  - Arrange for needed discharge resources and transportation as appropriate  - Identify discharge learning needs (meds, wound care, etc)  - Arrange for interpreters to assist at discharge as needed  - Consider post-discharge preferences of patient/family/discharge partner  - Complete POLST form as appropriate  - Assess patient's ability to be responsible for managing their own health  - Refer to Case Management Department for coordinating discharge planning if the patient needs post-hospital services based on physician/LIP order or complex needs related to functional status, cognitive ability or social support system  Outcome: Progressing     Problem: SKIN/TISSUE INTEGRITY - ADULT  Goal: Skin integrity remains intact  Description: INTERVENTIONS  - Assess and document risk factors for pressure ulcer development  - Assess and document skin integrity  - Monitor for areas of redness and/or skin breakdown  - Initiate interventions, skin care algorithm/standards of care as needed  Outcome: Progressing  Goal: Incision(s), wounds(s) or drain site(s) healing without S/S of infection  Description: INTERVENTIONS:  - Assess and document risk factors for pressure ulcer development  - Assess and document skin integrity  - Assess and document dressing/incision, wound bed, drain sites and surrounding tissue  - Implement wound care per orders  - Initiate isolation precautions as appropriate  - Initiate Pressure Ulcer prevention bundle as indicated  Outcome: Progressing     Problem: MUSCULOSKELETAL - ADULT  Goal: Return mobility to safest level of function  Description: INTERVENTIONS:  - Assess patient stability and activity tolerance for standing, transferring and ambulating w/ or w/o assistive devices  - Assist with transfers and ambulation using safe patient handling equipment as needed  - Ensure adequate protection for wounds/incisions during mobilization  - Obtain PT/OT consults as needed  - Advance activity as appropriate  - Communicate ordered activity level and limitations with patient/family  Outcome: Progressing  Goal: Maintain proper alignment of affected body part  Description: INTERVENTIONS:  - Support and protect limb and body alignment per provider's orders  - Instruct and reinforce with patient and family use of appropriate assistive device and precautions (e.g. spinal or hip dislocation precautions)  Outcome: Progressing     Problem: Impaired Functional Mobility  Goal: Achieve highest/safest level of mobility/gait  Description: Interventions:  - Assess patient's functional ability and stability  - Promote increasing activity/tolerance for mobility and gait  - Educate and engage patient/family in tolerated activity level and precautions    Outcome: Progressing   Pt worked with therapy and she is up in chair. Pain controlled with scheduled tylenol and Tramadol. Oxy  prn for break through pain. plan is to go to rehab. Call light with in reach and instructed to call for assistance.

## 2023-08-06 LAB
DEPRECATED RDW RBC AUTO: 44.3 FL (ref 35.1–46.3)
ERYTHROCYTE [DISTWIDTH] IN BLOOD BY AUTOMATED COUNT: 13.3 % (ref 11–15)
GLUCOSE BLDC GLUCOMTR-MCNC: 110 MG/DL (ref 70–99)
GLUCOSE BLDC GLUCOMTR-MCNC: 111 MG/DL (ref 70–99)
GLUCOSE BLDC GLUCOMTR-MCNC: 147 MG/DL (ref 70–99)
GLUCOSE BLDC GLUCOMTR-MCNC: 159 MG/DL (ref 70–99)
HCT VFR BLD AUTO: 28.5 %
HGB BLD-MCNC: 9.3 G/DL
MCH RBC QN AUTO: 29.8 PG (ref 26–34)
MCHC RBC AUTO-ENTMCNC: 32.6 G/DL (ref 31–37)
MCV RBC AUTO: 91.3 FL
PLATELET # BLD AUTO: 223 10(3)UL (ref 150–450)
RBC # BLD AUTO: 3.12 X10(6)UL
WBC # BLD AUTO: 8.4 X10(3) UL (ref 4–11)

## 2023-08-06 PROCEDURE — 82962 GLUCOSE BLOOD TEST: CPT

## 2023-08-06 PROCEDURE — 97110 THERAPEUTIC EXERCISES: CPT

## 2023-08-06 PROCEDURE — 97116 GAIT TRAINING THERAPY: CPT

## 2023-08-06 PROCEDURE — 85027 COMPLETE CBC AUTOMATED: CPT | Performed by: STUDENT IN AN ORGANIZED HEALTH CARE EDUCATION/TRAINING PROGRAM

## 2023-08-06 PROCEDURE — 94799 UNLISTED PULMONARY SVC/PX: CPT

## 2023-08-06 RX ORDER — HYDROCODONE BITARTRATE AND ACETAMINOPHEN 10; 325 MG/1; MG/1
1 TABLET ORAL EVERY 4 HOURS PRN
Status: DISCONTINUED | OUTPATIENT
Start: 2023-08-06 | End: 2023-08-09

## 2023-08-06 RX ORDER — HYDROCODONE BITARTRATE AND ACETAMINOPHEN 5; 325 MG/1; MG/1
1-2 TABLET ORAL EVERY 6 HOURS PRN
Qty: 40 TABLET | Refills: 0 | Status: SHIPPED | OUTPATIENT
Start: 2023-08-06

## 2023-08-06 NOTE — PLAN OF CARE
Problem: Patient Centered Care  Goal: Patient preferences are identified and integrated in the patient's plan of care  Description: Interventions:  - What would you like us to know as we care for you?   - Provide timely, complete, and accurate information to patient/family  - Incorporate patient and family knowledge, values, beliefs, and cultural backgrounds into the planning and delivery of care  - Encourage patient/family to participate in care and decision-making at the level they choose  - Honor patient and family perspectives and choices  Outcome: Progressing     Problem: Patient/Family Goals  Goal: Patient/Family Long Term Goal  Description: Patient's Long Term Goal:     Interventions:  - PT/OT  - Pain medication  - See additional Care Plan goals for specific interventions  Outcome: Progressing  Goal: Patient/Family Short Term Goal  Description: Patient's Short Term Goal:     Interventions:   - Pain medication  - PT/OT  - Monitor labs  - See additional Care Plan goals for specific interventions  Outcome: Progressing     Problem: PAIN - ADULT  Goal: Verbalizes/displays adequate comfort level or patient's stated pain goal  Description: INTERVENTIONS:  - Encourage pt to monitor pain and request assistance  - Assess pain using appropriate pain scale  - Administer analgesics based on type and severity of pain and evaluate response  - Implement non-pharmacological measures as appropriate and evaluate response  - Consider cultural and social influences on pain and pain management  - Manage/alleviate anxiety  - Utilize distraction and/or relaxation techniques  - Monitor for opioid side effects  - Notify MD/LIP if interventions unsuccessful or patient reports new pain  - Anticipate increased pain with activity and pre-medicate as appropriate  Outcome: Progressing     Problem: SAFETY ADULT - FALL  Goal: Free from fall injury  Description: INTERVENTIONS:  - Assess pt frequently for physical needs  - Identify cognitive and physical deficits and behaviors that affect risk of falls.   - [place] fall precautions as indicated by assessment.  - Educate pt/family on patient safety including physical limitations  - Instruct pt to call for assistance with activity based on assessment  - Modify environment to reduce risk of injury  - Provide assistive devices as appropriate  - Consider OT/PT consult to assist with strengthening/mobility  - Encourage toileting schedule  Outcome: Progressing     Problem: DISCHARGE PLANNING  Goal: Discharge to home or other facility with appropriate resources  Description: INTERVENTIONS:  - Identify barriers to discharge w/pt and caregiver  - Include patient/family/discharge partner in discharge planning  - Arrange for needed discharge resources and transportation as appropriate  - Identify discharge learning needs (meds, wound care, etc)  - Arrange for interpreters to assist at discharge as needed  - Consider post-discharge preferences of patient/family/discharge partner  - Complete POLST form as appropriate  - Assess patient's ability to be responsible for managing their own health  - Refer to Case Management Department for coordinating discharge planning if the patient needs post-hospital services based on physician/LIP order or complex needs related to functional status, cognitive ability or social support system  Outcome: Progressing     Problem: SKIN/TISSUE INTEGRITY - ADULT  Goal: Skin integrity remains intact  Description: INTERVENTIONS  - Assess and document risk factors for pressure ulcer development  - Assess and document skin integrity  - Monitor for areas of redness and/or skin breakdown  - Initiate interventions, skin care algorithm/standards of care as needed  Outcome: Progressing  Goal: Incision(s), wounds(s) or drain site(s) healing without S/S of infection  Description: INTERVENTIONS:  - Assess and document risk factors for pressure ulcer development  - Assess and document skin integrity  - Assess and document dressing/incision, wound bed, drain sites and surrounding tissue  - Implement wound care per orders  - Initiate isolation precautions as appropriate  - Initiate Pressure Ulcer prevention bundle as indicated  Outcome: Progressing     Problem: MUSCULOSKELETAL - ADULT  Goal: Return mobility to safest level of function  Description: INTERVENTIONS:  - Assess patient stability and activity tolerance for standing, transferring and ambulating w/ or w/o assistive devices  - Assist with transfers and ambulation using safe patient handling equipment as needed  - Ensure adequate protection for wounds/incisions during mobilization  - Obtain PT/OT consults as needed  - Advance activity as appropriate  - Communicate ordered activity level and limitations with patient/family  Outcome: Progressing  Goal: Maintain proper alignment of affected body part  Description: INTERVENTIONS:  - Support and protect limb and body alignment per provider's orders  - Instruct and reinforce with patient and family use of appropriate assistive device and precautions (e.g. spinal or hip dislocation precautions)  Outcome: Progressing     Problem: Impaired Functional Mobility  Goal: Achieve highest/safest level of mobility/gait  Description: Interventions:  - Assess patient's functional ability and stability  - Promote increasing activity/tolerance for mobility and gait  - Educate and engage patient/family in tolerated activity level and precautions  - Recommend use of  RW for transfers and ambulation  Outcome: Progressing     Gely Keating was resting comfortably in bed, able to ambulate x 1 assist and walker to the bathroom, and voiding freely. Her pain was managed with PRN Oxy and scheduled pain medications. Ice pack also applied to the hip. She's on tele, accucheck AC/HS, and CPAP at HS.  Plan for discharge to rehab pending insurance approval.

## 2023-08-06 NOTE — PLAN OF CARE
Himanshu Jose is post op day #2, S/P right hip. Hip dressing dry and intact. Neuro checks intact. Moderate P/D flexion. Smith is managed w Norco tab PRN. C/o nausea today after receiving OXY- zofran given and pain meds switched Norco. BM todayBG Ac/Hs- no insulin coverage required. Ice pack to hip PRN. Up w walker and SB assist. Planning rehab [facility] medically cleared- await insurance auth and PASS screening. Problem: PAIN - ADULT  Goal: Verbalizes/displays adequate comfort level or patient's stated pain goal  Description: INTERVENTIONS:  - Encourage pt to monitor pain and request assistance  - Assess pain using appropriate pain scale  - Administer analgesics based on type and severity of pain and evaluate response  - Implement non-pharmacological measures as appropriate and evaluate response  - Consider cultural and social influences on pain and pain management  - Manage/alleviate anxiety  - Utilize distraction and/or relaxation techniques  - Monitor for opioid side effects  - Notify MD/LIP if interventions unsuccessful or patient reports new pain  - Anticipate increased pain with activity and pre-medicate as appropriate  Outcome: Progressing     Problem: SAFETY ADULT - FALL  Goal: Free from fall injury  Description: INTERVENTIONS:  - Assess pt frequently for physical needs  - Identify cognitive and physical deficits and behaviors that affect risk of falls.   - [place] fall precautions as indicated by assessment.  - Educate pt/family on patient safety including physical limitations  - Instruct pt to call for assistance with activity based on assessment  - Modify environment to reduce risk of injury  - Provide assistive devices as appropriate  - Consider OT/PT consult to assist with strengthening/mobility  - Encourage toileting schedule  Outcome: Progressing     Problem: DISCHARGE PLANNING  Goal: Discharge to home or other facility with appropriate resources  Description: INTERVENTIONS:  - Identify barriers to discharge w/pt and caregiver  - Include patient/family/discharge partner in discharge planning  - Arrange for needed discharge resources and transportation as appropriate  - Identify discharge learning needs (meds, wound care, etc)  - Arrange for interpreters to assist at discharge as needed  - Consider post-discharge preferences of patient/family/discharge partner  - Complete POLST form as appropriate  - Assess patient's ability to be responsible for managing their own health  - Refer to Case Management Department for coordinating discharge planning if the patient needs post-hospital services based on physician/LIP order or complex needs related to functional status, cognitive ability or social support system  Outcome: Progressing     Problem: SKIN/TISSUE INTEGRITY - ADULT  Goal: Skin integrity remains intact  Description: INTERVENTIONS  - Assess and document risk factors for pressure ulcer development  - Assess and document skin integrity  - Monitor for areas of redness and/or skin breakdown  - Initiate interventions, skin care algorithm/standards of care as needed  Outcome: Progressing     Problem: MUSCULOSKELETAL - ADULT  Goal: Return mobility to safest level of function  Description: INTERVENTIONS:  - Assess patient stability and activity tolerance for standing, transferring and ambulating w/ or w/o assistive devices  - Assist with transfers and ambulation using safe patient handling equipment as needed  - Ensure adequate protection for wounds/incisions during mobilization  - Obtain PT/OT consults as needed  - Advance activity as appropriate  - Communicate ordered activity level and limitations with patient/family  Outcome: Progressing

## 2023-08-06 NOTE — PHYSICAL THERAPY NOTE
PHYSICAL THERAPY TREATMENT NOTE - INPATIENT     Room Number: 433/433-A       Presenting Problem: R ant hip    Problem List  Active Problems:    Preop testing      PHYSICAL THERAPY ASSESSMENT   Chart reviewed. RN  approved participation in physical therapy. PPE worn by therapist: mask, gloves, and goggles. Patient was wearing a mask during session. Patient presented in bed with 6/10 pain. Patient with good  progress towards goals during this session. Education provided on Physical therapy plan of care and physiological benefits of out of bed mobility. Patient with good carryover. Bed mobility: Min assist  Transfers: Min assist  Gait Assistance: Minimum assistance  Distance (ft): 2 x 40  Assistive Device: Rolling walker           Pt seen daily. Min a for bed mobility and transfer. Extra time provided to complete task. EOB sitting balance activity with emphasis on core stabilization. Pt educated on deep breathing. Pt amb 2 x 40 ft with RW and min a;provided cuing for gait pattern as well as for postural awareness. There ex. Patient was left in bedside chair at end of session with all needs in reach. The patient's Approx Degree of Impairment: 50.57% has been calculated based on documentation in the [facility] '6 clicks' Inpatient Basic Mobility Short Form. Research supports that patients with this level of impairment may benefit from Subacute Rehab. . RN aware of patient status post session. DISCHARGE RECOMMENDATIONS  PT Discharge Recommendations: Sub-acute rehabilitation     PLAN  PT Treatment Plan: Bed mobility; Endurance; Patient education;Gait training    SUBJECTIVE  Pt reports being ready for PT RX    OBJECTIVE  Precautions: None (no hip precautions per ortho)    WEIGHT BEARING RESTRICTION  Weight Bearing Restriction: R lower extremity        R Lower Extremity: Weight Bearing as Tolerated       PAIN ASSESSMENT   Ratin  Location:  (R hip)  Management Techniques: Repositioning    BALANCE Static Sitting: Fair  Dynamic Sitting: Fair           Static Standing: Fair -  Dynamic Standing: Poor +    ACTIVITY TOLERANCE                         O2 WALK       AM-PAC '6-Clicks' INPATIENT SHORT FORM - BASIC MOBILITY  How much difficulty does the patient currently have. .. Patient Difficulty: Turning over in bed (including adjusting bedclothes, sheets and blankets)?: A Little   Patient Difficulty: Sitting down on and standing up from a chair with arms (e.g., wheelchair, bedside commode, etc.): A Little   Patient Difficulty: Moving from lying on back to sitting on the side of the bed?: A Little   How much help from another person does the patient currently need. .. Help from Another: Moving to and from a bed to a chair (including a wheelchair)?: A Little   Help from Another: Need to walk in hospital room?: A Little   Help from Another: Climbing 3-5 steps with a railing?: A Lot     AM-PAC Score:  Raw Score: 17   Approx Degree of Impairment: 50.57%   Standardized Score (AM-PAC Scale): 42.13   CMS Modifier (G-Code): CK      Additional information:     THERAPEUTIC EXERCISES  Lower Extremity Ankle pumps  Glut sets  Quad sets     Position Supine       Patient End of Session: Up in chair;Call light within reach;RN aware of session/findings; All patient questions and concerns addressed    CURRENT GOALS     Patient Goal Patient's self-stated goal is: to go home   Goal #1 Patient is able to demonstrate supine - sit EOB @ level: supervision     Goal #1   Current Status Min a   Goal #2 Patient is able to demonstrate transfers Sit to/from Stand at assistance level: supervision with walker - rolling     Goal #2  Current Status Min a   Goal #3 Patient is able to ambulate 150 feet with assist device: walker - rolling at assistance level: supervision   Goal #3   Current Status Pt amb 2 x 40 ft with RW and Min a   Goal #4 Patient will negotiate 3 stairs/one curb w/ assistive device and supervision   Goal #4   Current Status NT   Goal #5 Patient to demonstrate independence with home activity/exercise instructions provided to patient in preparation for discharge. Goal #5   Current Status In progress   Goal #6    Goal #6  Current Status    Gait-15 minutes;   There ex-15 minutes

## 2023-08-07 LAB
DEPRECATED RDW RBC AUTO: 44.7 FL (ref 35.1–46.3)
ERYTHROCYTE [DISTWIDTH] IN BLOOD BY AUTOMATED COUNT: 13.2 % (ref 11–15)
GLUCOSE BLDC GLUCOMTR-MCNC: 114 MG/DL (ref 70–99)
GLUCOSE BLDC GLUCOMTR-MCNC: 129 MG/DL (ref 70–99)
GLUCOSE BLDC GLUCOMTR-MCNC: 133 MG/DL (ref 70–99)
GLUCOSE BLDC GLUCOMTR-MCNC: 143 MG/DL (ref 70–99)
HCT VFR BLD AUTO: 28.5 %
HGB BLD-MCNC: 9.1 G/DL
MCH RBC QN AUTO: 29.3 PG (ref 26–34)
MCHC RBC AUTO-ENTMCNC: 31.9 G/DL (ref 31–37)
MCV RBC AUTO: 91.6 FL
PLATELET # BLD AUTO: 246 10(3)UL (ref 150–450)
RBC # BLD AUTO: 3.11 X10(6)UL
WBC # BLD AUTO: 8.6 X10(3) UL (ref 4–11)

## 2023-08-07 PROCEDURE — 97110 THERAPEUTIC EXERCISES: CPT

## 2023-08-07 PROCEDURE — 85027 COMPLETE CBC AUTOMATED: CPT | Performed by: STUDENT IN AN ORGANIZED HEALTH CARE EDUCATION/TRAINING PROGRAM

## 2023-08-07 PROCEDURE — 94799 UNLISTED PULMONARY SVC/PX: CPT

## 2023-08-07 PROCEDURE — 82962 GLUCOSE BLOOD TEST: CPT

## 2023-08-07 RX ORDER — CEPHALEXIN 500 MG/1
500 CAPSULE ORAL EVERY 6 HOURS SCHEDULED
Qty: 16 CAPSULE | Refills: 0 | Status: SHIPPED | OUTPATIENT
Start: 2023-08-07 | End: 2023-08-11

## 2023-08-07 NOTE — PHYSICAL THERAPY NOTE
PHYSICAL THERAPY TREATMENT NOTE - INPATIENT     Room Number: 433/433-A       Presenting Problem: R ant hip    Problem List  Active Problems:    Preop testing      PHYSICAL THERAPY ASSESSMENT   Chart reviewed. RN  approved participation in physical therapy. PPE worn by therapist: mask, gloves, and goggles. Patient was wearing a mask during session. Patient presented in bed with 6/10 pain. Patient with good  progress towards goals during this session. Education provided on Physical therapy plan of care and physiological benefits of out of bed mobility. Patient with good carryover. Bed mobility: Min assist  Transfers: Min assist  Gait Assistance: Minimum assistance  Distance (ft): 2 x 40  Assistive Device: Rolling walker  Pattern: R Decreased stance time; Shuffle        Pt seen daily. Min a for bed mobility and transfer. Extra time provided to complete task. EOB sitting balance activity with emphasis on core stabilization. Pt educated on deep breathing. Hip precautions reviewed;education;Pt recall 2/3 hip precautions. Pt amb 2 x 40 ft with RW and min a;provided cuing for gait pattern as well as for postural awareness. Poor pain control is a limiting factor in pt progress. There ex. Patient was left in bedside chair at end of session with all needs in reach. The patient's Approx Degree of Impairment: 50.57% has been calculated based on documentation in the [facility] '6 clicks' Inpatient Basic Mobility Short Form. Research supports that patients with this level of impairment may benefit from Subacute Rehab. . RN aware of patient status post session. DISCHARGE RECOMMENDATIONS  PT Discharge Recommendations: Sub-acute rehabilitation     PLAN  PT Treatment Plan: Bed mobility; Body mechanics; Endurance    SUBJECTIVE  Pt reports being ready for PT RX    OBJECTIVE  Precautions: None (no hip precautions per ortho)    WEIGHT BEARING RESTRICTION  Weight Bearing Restriction: R lower extremity        R Lower Extremity: Weight Bearing as Tolerated       PAIN ASSESSMENT   Ratin  Location:  (R hip)  Management Techniques: Repositioning    BALANCE                                                                                                                       Static Sitting: Fair  Dynamic Sitting: Fair           Static Standing: Fair -  Dynamic Standing: Poor +    ACTIVITY TOLERANCE                         O2 WALK       AM-PAC '6-Clicks' INPATIENT SHORT FORM - BASIC MOBILITY  How much difficulty does the patient currently have. .. Patient Difficulty: Turning over in bed (including adjusting bedclothes, sheets and blankets)?: A Little   Patient Difficulty: Sitting down on and standing up from a chair with arms (e.g., wheelchair, bedside commode, etc.): A Little   Patient Difficulty: Moving from lying on back to sitting on the side of the bed?: A Little   How much help from another person does the patient currently need. .. Help from Another: Moving to and from a bed to a chair (including a wheelchair)?: A Little   Help from Another: Need to walk in hospital room?: A Little   Help from Another: Climbing 3-5 steps with a railing?: A Lot     AM-PAC Score:  Raw Score: 17   Approx Degree of Impairment: 50.57%   Standardized Score (AM-PAC Scale): 42.13   CMS Modifier (G-Code): CK      Additional information:     THERAPEUTIC EXERCISES  Lower Extremity Ankle pumps  Glut sets  Quad sets     Position Supine       Patient End of Session: Up in chair;Call light within reach;RN aware of session/findings; All patient questions and concerns addressed    CURRENT GOALS     Patient Goal Patient's self-stated goal is: to go home   Goal #1 Patient is able to demonstrate supine - sit EOB @ level: supervision     Goal #1   Current Status Min a   Goal #2 Patient is able to demonstrate transfers Sit to/from Stand at assistance level: supervision with walker - rolling     Goal #2  Current Status Min a   Goal #3 Patient is able to ambulate 150 feet with assist device: walker - rolling at assistance level: supervision   Goal #3   Current Status Pt amb 2 x 40 ft with RW and Min a   Goal #4 Patient will negotiate 3 stairs/one curb w/ assistive device and supervision   Goal #4   Current Status NT   Goal #5 Patient to demonstrate independence with home activity/exercise instructions provided to patient in preparation for discharge. Goal #5   Current Status In progress   Goal #6    Goal #6  Current Status    Gait-15 minutes;   There ex-15 minutes

## 2023-08-07 NOTE — PLAN OF CARE
Wilton Vidales is post op day #3  S/P right hip. Aquacel Hip dressing dry and intact. Neuro checks intact. Moderate P/D flexion. Smith is managed w Norco tab PRN. C/o nausea today medicated w Zofran x1 and compazine x1 dos. BM yesterday. BG Ac/Hs- no insulin coverage required. Ice pack to hip PRN. Up w walker and SB assist. Planning rehab when medically cleared- await insurance auth. Problem: PAIN - ADULT  Goal: Verbalizes/displays adequate comfort level or patient's stated pain goal  Description: INTERVENTIONS:  - Encourage pt to monitor pain and request assistance  - Assess pain using appropriate pain scale  - Administer analgesics based on type and severity of pain and evaluate response  - Implement non-pharmacological measures as appropriate and evaluate response  - Consider cultural and social influences on pain and pain management  - Manage/alleviate anxiety  - Utilize distraction and/or relaxation techniques  - Monitor for opioid side effects  - Notify MD/LIP if interventions unsuccessful or patient reports new pain  - Anticipate increased pain with activity and pre-medicate as appropriate  Outcome: Progressing     Problem: SAFETY ADULT - FALL  Goal: Free from fall injury  Description: INTERVENTIONS:  - Assess pt frequently for physical needs  - Identify cognitive and physical deficits and behaviors that affect risk of falls.   - [place] fall precautions as indicated by assessment.  - Educate pt/family on patient safety including physical limitations  - Instruct pt to call for assistance with activity based on assessment  - Modify environment to reduce risk of injury  - Provide assistive devices as appropriate  - Consider OT/PT consult to assist with strengthening/mobility  - Encourage toileting schedule  Outcome: Progressing     Problem: DISCHARGE PLANNING  Goal: Discharge to home or other facility with appropriate resources  Description: INTERVENTIONS:  - Identify barriers to discharge w/pt and caregiver  - Include patient/family/discharge partner in discharge planning  - Arrange for needed discharge resources and transportation as appropriate  - Identify discharge learning needs (meds, wound care, etc)  - Arrange for interpreters to assist at discharge as needed  - Consider post-discharge preferences of patient/family/discharge partner  - Complete POLST form as appropriate  - Assess patient's ability to be responsible for managing their own health  - Refer to Case Management Department for coordinating discharge planning if the patient needs post-hospital services based on physician/LIP order or complex needs related to functional status, cognitive ability or social support system  Outcome: Progressing     Problem: SKIN/TISSUE INTEGRITY - ADULT  Goal: Skin integrity remains intact  Description: INTERVENTIONS  - Assess and document risk factors for pressure ulcer development  - Assess and document skin integrity  - Monitor for areas of redness and/or skin breakdown  - Initiate interventions, skin care algorithm/standards of care as needed  Outcome: Progressing     Problem: MUSCULOSKELETAL - ADULT  Goal: Return mobility to safest level of function  Description: INTERVENTIONS:  - Assess patient stability and activity tolerance for standing, transferring and ambulating w/ or w/o assistive devices  - Assist with transfers and ambulation using safe patient handling equipment as needed  - Ensure adequate protection for wounds/incisions during mobilization  - Obtain PT/OT consults as needed  - Advance activity as appropriate  - Communicate ordered activity level and limitations with patient/family  Outcome: Progressing     Problem: Impaired Functional Mobility  Goal: Achieve highest/safest level of mobility/gait  Description: Interventions:  - Assess patient's functional ability and stability  - Promote increasing activity/tolerance for mobility and gait  - Educate and engage patient/family in tolerated activity level and precautions    Outcome: Progressing

## 2023-08-07 NOTE — CM/SW NOTE
Department  asked to send updates to [facility] referral for [facility]. Assigned SW/CM to follow up with patient/family on discharge plan.      Hermelinda Cintron   August 07, 2023   09:14

## 2023-08-07 NOTE — CM/SW NOTE
08/07/23 1000   CM/SW Referral Data   Referral Source Physician   Reason for Referral Discharge planning   Informant Patient   Medical Hx   Does patient have an established PCP? Yes   Significant Past Medical/Mental Health Hx RT total hip- Dr. Pop Gilmore   Patient Info   Patient's Current Mental Status at Time of Assessment Alert;Oriented   Patient's [address]   Number of Levels in Home 2   Number of Stair in Home 15 up to bed room   Patient lives with Alone   Patient Status Prior to Admission   Independent with ADLs and Mobility Yes   Services in place prior to admission DME/Supplies at home   Type of DME/Supplies CPAP   Discharge Needs   Anticipated D/C needs Subacute rehab   Services Requested   PASRR Level 1 Submitted Yes   Choice of Post-Acute Provider   Informed patient of right to choose their preferred provider Yes   List of appropriate post-acute services provided to patient/family with quality data Yes   Patient/family choice [facility]     Met with pt at bedside. Discussed [facility] and provided [facility] list.   Pt is from home and lives alone. Pt states that she has a lot of stairs at home and agrees to [facility] on dc. Pt has no hx of SLADE or HH. [facility] list given. Pt has chosen to rehab at [facility]. [facility] reserved via [facility] and notified to begin ins auth. PASRR queued for review. Plan: Georgina heller pending ins auth. / to remain available for support and/or discharge planning. Nitza Grajeda.  Dusty Peabody RN, BSN  Nurse   [phone]

## 2023-08-07 NOTE — PLAN OF CARE
Problem: Patient Centered Care  Goal: Patient preferences are identified and integrated in the patient's plan of care  Description: Interventions:  - What would you like us to know as we care for you?   - Provide timely, complete, and accurate information to patient/family  - Incorporate patient and family knowledge, values, beliefs, and cultural backgrounds into the planning and delivery of care  - Encourage patient/family to participate in care and decision-making at the level they choose  - Honor patient and family perspectives and choices  Outcome: Progressing     Problem: Patient/Family Goals  Goal: Patient/Family Long Term Goal  Description: Patient's Long Term Goal:     Interventions:  - PT/OT  - Pain medication  - See additional Care Plan goals for specific interventions  Outcome: Progressing  Goal: Patient/Family Short Term Goal  Description: Patient's Short Term Goal:     Interventions:   - PT/OT  - Pain medication  - Monitor labs  - See additional Care Plan goals for specific interventions  Outcome: Progressing     Problem: PAIN - ADULT  Goal: Verbalizes/displays adequate comfort level or patient's stated pain goal  Description: INTERVENTIONS:  - Encourage pt to monitor pain and request assistance  - Assess pain using appropriate pain scale  - Administer analgesics based on type and severity of pain and evaluate response  - Implement non-pharmacological measures as appropriate and evaluate response  - Consider cultural and social influences on pain and pain management  - Manage/alleviate anxiety  - Utilize distraction and/or relaxation techniques  - Monitor for opioid side effects  - Notify MD/LIP if interventions unsuccessful or patient reports new pain  - Anticipate increased pain with activity and pre-medicate as appropriate  Outcome: Progressing     Problem: SAFETY ADULT - FALL  Goal: Free from fall injury  Description: INTERVENTIONS:  - Assess pt frequently for physical needs  - Identify cognitive and physical deficits and behaviors that affect risk of falls.   - [place] fall precautions as indicated by assessment.  - Educate pt/family on patient safety including physical limitations  - Instruct pt to call for assistance with activity based on assessment  - Modify environment to reduce risk of injury  - Provide assistive devices as appropriate  - Consider OT/PT consult to assist with strengthening/mobility  - Encourage toileting schedule  Outcome: Progressing     Problem: DISCHARGE PLANNING  Goal: Discharge to home or other facility with appropriate resources  Description: INTERVENTIONS:  - Identify barriers to discharge w/pt and caregiver  - Include patient/family/discharge partner in discharge planning  - Arrange for needed discharge resources and transportation as appropriate  - Identify discharge learning needs (meds, wound care, etc)  - Arrange for interpreters to assist at discharge as needed  - Consider post-discharge preferences of patient/family/discharge partner  - Complete POLST form as appropriate  - Assess patient's ability to be responsible for managing their own health  - Refer to Case Management Department for coordinating discharge planning if the patient needs post-hospital services based on physician/LIP order or complex needs related to functional status, cognitive ability or social support system  Outcome: Progressing     Problem: SKIN/TISSUE INTEGRITY - ADULT  Goal: Skin integrity remains intact  Description: INTERVENTIONS  - Assess and document risk factors for pressure ulcer development  - Assess and document skin integrity  - Monitor for areas of redness and/or skin breakdown  - Initiate interventions, skin care algorithm/standards of care as needed  Outcome: Progressing  Goal: Incision(s), wounds(s) or drain site(s) healing without S/S of infection  Description: INTERVENTIONS:  - Assess and document risk factors for pressure ulcer development  - Assess and document skin integrity  - Assess and document dressing/incision, wound bed, drain sites and surrounding tissue  - Implement wound care per orders  - Initiate isolation precautions as appropriate  - Initiate Pressure Ulcer prevention bundle as indicated  Outcome: Progressing     Problem: MUSCULOSKELETAL - ADULT  Goal: Return mobility to safest level of function  Description: INTERVENTIONS:  - Assess patient stability and activity tolerance for standing, transferring and ambulating w/ or w/o assistive devices  - Assist with transfers and ambulation using safe patient handling equipment as needed  - Ensure adequate protection for wounds/incisions during mobilization  - Obtain PT/OT consults as needed  - Advance activity as appropriate  - Communicate ordered activity level and limitations with patient/family  Outcome: Progressing  Goal: Maintain proper alignment of affected body part  Description: INTERVENTIONS:  - Support and protect limb and body alignment per provider's orders  - Instruct and reinforce with patient and family use of appropriate assistive device and precautions (e.g. spinal or hip dislocation precautions)  Outcome: Progressing     Problem: Impaired Functional Mobility  Goal: Achieve highest/safest level of mobility/gait  Description: Interventions:  - Assess patient's functional ability and stability  - Promote increasing activity/tolerance for mobility and gait  - Educate and engage patient/family in tolerated activity level and precautions  - Recommend use of  RW for transfers and ambulation  Outcome: Michell Coronado stated that pain was better than previous days, able to ambulate x 1 assist and a walker to the bathroom, and voiding freely. Her pain was managed well with PRN Norco and ice pack to the hip was also applied. Pt on tele, accucheck AC/HS, and CPAP at HS. IS was encouraged. Pt also on oral ABT, she's afebrile with no adverse reaction noted.  Plan for discharge to rehab pending insurance approval.

## 2023-08-08 LAB
GLUCOSE BLDC GLUCOMTR-MCNC: 135 MG/DL (ref 70–99)
GLUCOSE BLDC GLUCOMTR-MCNC: 139 MG/DL (ref 70–99)

## 2023-08-08 PROCEDURE — 82962 GLUCOSE BLOOD TEST: CPT

## 2023-08-08 PROCEDURE — 97110 THERAPEUTIC EXERCISES: CPT

## 2023-08-08 PROCEDURE — 97116 GAIT TRAINING THERAPY: CPT

## 2023-08-08 PROCEDURE — 94799 UNLISTED PULMONARY SVC/PX: CPT

## 2023-08-08 RX ORDER — KETOROLAC TROMETHAMINE 15 MG/ML
15 INJECTION, SOLUTION INTRAMUSCULAR; INTRAVENOUS ONCE
Status: COMPLETED | OUTPATIENT
Start: 2023-08-08 | End: 2023-08-08

## 2023-08-08 RX ORDER — KETOROLAC TROMETHAMINE 15 MG/ML
15 INJECTION, SOLUTION INTRAMUSCULAR; INTRAVENOUS ONCE
Status: COMPLETED | OUTPATIENT
Start: 2023-08-08 | End: 2023-08-09

## 2023-08-08 RX ORDER — ANASTROZOLE 1 MG/1
1 TABLET ORAL DAILY
Status: DISCONTINUED | OUTPATIENT
Start: 2023-08-11 | End: 2023-08-08

## 2023-08-08 RX ORDER — ONDANSETRON 2 MG/ML
INJECTION INTRAMUSCULAR; INTRAVENOUS
Status: COMPLETED
Start: 2023-08-08 | End: 2023-08-08

## 2023-08-08 NOTE — PHYSICAL THERAPY NOTE
PHYSICAL THERAPY TREATMENT NOTE - INPATIENT     Room Number: 433/433-A       Presenting Problem: R ant hip    Problem List  Active Problems:    Preop testing      PHYSICAL THERAPY ASSESSMENT   Chart reviewed. RN  approved participation in physical therapy. PPE worn by therapist: mask, gloves, and goggles. Patient was wearing a mask during session. Patient presented in bed with 6/10 pain. Patient with good  progress towards goals during this session. Education provided on Physical therapy plan of care and physiological benefits of out of bed mobility. Patient with good carryover. Bed mobility: Min assist  Transfers: Min assist  Gait Assistance: Minimum assistance  Distance (ft): 40  Assistive Device: Rolling walker  Pattern: R Decreased stance time        Pt seen daily. Min a for bed mobility and transfer. Extra time provided to complete task. EOB sitting balance activity with emphasis on core stabilization. Pt educated on deep breathing. Hip precautions reviewed;education;Pt recall 3/3 hip precautions. Pt amb  40 ft with RW and min a;provided cuing for gait pattern as well as for postural awareness. Poor pain control is a limiting factor in pt progress. Pt c/o general weakness. There ex. Patient was left in bedside chair at end of session with all needs in reach. The patient's Approx Degree of Impairment: 50.57% has been calculated based on documentation in the [facility] '6 clicks' Inpatient Basic Mobility Short Form. Research supports that patients with this level of impairment may benefit from Subacute Rehab. . RN aware of patient status post session. DISCHARGE RECOMMENDATIONS  PT Discharge Recommendations: Sub-acute rehabilitation     PLAN  PT Treatment Plan: Bed mobility; Body mechanics; Endurance;Gait training    SUBJECTIVE  Pt reports being ready for PT RX    OBJECTIVE  Precautions: None (no hip precautions per ortho)    WEIGHT BEARING RESTRICTION  Weight Bearing Restriction: R lower extremity        R Lower Extremity: Weight Bearing as Tolerated       PAIN ASSESSMENT   Ratin  Location:  (R hip)  Management Techniques: Repositioning    BALANCE                                                                                                                       Static Sitting: Fair  Dynamic Sitting: Fair           Static Standing: Fair -  Dynamic Standing: Poor +    ACTIVITY TOLERANCE           BP: 130/69  BP Location: Right arm  BP Method: Automatic  Patient Position: Lying    O2 WALK       AM-PAC '6-Clicks' INPATIENT SHORT FORM - BASIC MOBILITY  How much difficulty does the patient currently have. .. Patient Difficulty: Turning over in bed (including adjusting bedclothes, sheets and blankets)?: A Little   Patient Difficulty: Sitting down on and standing up from a chair with arms (e.g., wheelchair, bedside commode, etc.): A Little   Patient Difficulty: Moving from lying on back to sitting on the side of the bed?: A Little   How much help from another person does the patient currently need. .. Help from Another: Moving to and from a bed to a chair (including a wheelchair)?: A Little   Help from Another: Need to walk in hospital room?: A Little   Help from Another: Climbing 3-5 steps with a railing?: A Lot     AM-PAC Score:  Raw Score: 17   Approx Degree of Impairment: 50.57%   Standardized Score (AM-PAC Scale): 42.13   CMS Modifier (G-Code): CK      Additional information:     THERAPEUTIC EXERCISES  Lower Extremity Ankle pumps  Glut sets  Quad sets     Position Supine       Patient End of Session: Up in chair;Call light within reach;RN aware of session/findings; All patient questions and concerns addressed    CURRENT GOALS     Patient Goal Patient's self-stated goal is: to go home   Goal #1 Patient is able to demonstrate supine - sit EOB @ level: supervision     Goal #1   Current Status Min a   Goal #2 Patient is able to demonstrate transfers Sit to/from Stand at assistance level: supervision with walker - rolling Goal #2  Current Status Min a   Goal #3 Patient is able to ambulate 150 feet with assist device: walker - rolling at assistance level: supervision   Goal #3   Current Status Pt amb  40 ft with RW and Min a   Goal #4 Patient will negotiate 3 stairs/one curb w/ assistive device and supervision   Goal #4   Current Status NT   Goal #5 Patient to demonstrate independence with home activity/exercise instructions provided to patient in preparation for discharge. Goal #5   Current Status In progress   Goal #6    Goal #6  Current Status    Gait-15 minutes;   There ex-15 minutes

## 2023-08-08 NOTE — PLAN OF CARE
Patient is alert and oriented. Some episodes of nausea early in shift, which has resolved. Tylenol and toradol given for pain. Remote tele, no calls. [facility]S accuchecks. Eliquis and scd for prophylaxis. Will discharge to rehab, waiting on insurance auth.       Problem: Patient Centered Care  Goal: Patient preferences are identified and integrated in the patient's plan of care  Description: Interventions:  - What would you like us to know as we care for you?   - Provide timely, complete, and accurate information to patient/family  - Incorporate patient and family knowledge, values, beliefs, and cultural backgrounds into the planning and delivery of care  - Encourage patient/family to participate in care and decision-making at the level they choose  - Honor patient and family perspectives and choices  Outcome: Progressing     Problem: Patient/Family Goals  Goal: Patient/Family Long Term Goal  Description: Patient's Long Term Goal:     Interventions:  -   - See additional Care Plan goals for specific interventions  Outcome: Progressing  Goal: Patient/Family Short Term Goal  Description: Patient's Short Term Goal:     Interventions:   -   - See additional Care Plan goals for specific interventions  Outcome: Progressing     Problem: PAIN - ADULT  Goal: Verbalizes/displays adequate comfort level or patient's stated pain goal  Description: INTERVENTIONS:  - Encourage pt to monitor pain and request assistance  - Assess pain using appropriate pain scale  - Administer analgesics based on type and severity of pain and evaluate response  - Implement non-pharmacological measures as appropriate and evaluate response  - Consider cultural and social influences on pain and pain management  - Manage/alleviate anxiety  - Utilize distraction and/or relaxation techniques  - Monitor for opioid side effects  - Notify MD/LIP if interventions unsuccessful or patient reports new pain  - Anticipate increased pain with activity and pre-medicate as appropriate  Outcome: Progressing     Problem: SAFETY ADULT - FALL  Goal: Free from fall injury  Description: INTERVENTIONS:  - Assess pt frequently for physical needs  - Identify cognitive and physical deficits and behaviors that affect risk of falls.   - [place] fall precautions as indicated by assessment.  - Educate pt/family on patient safety including physical limitations  - Instruct pt to call for assistance with activity based on assessment  - Modify environment to reduce risk of injury  - Provide assistive devices as appropriate  - Consider OT/PT consult to assist with strengthening/mobility  - Encourage toileting schedule  Outcome: Progressing

## 2023-08-08 NOTE — PLAN OF CARE
Pain controlled with scheduled tylenol. Pt moving well to bathroom with standby assist. Needs assist lifting R leg into bed. Eating better today. Nausea x 1 after lunch. Zofran and camomile tea given. Feeling better. Transfer to [facility] when bed available.     Problem: PAIN - ADULT  Goal: Verbalizes/displays adequate comfort level or patient's stated pain goal  Description: INTERVENTIONS:  - Encourage pt to monitor pain and request assistance  - Assess pain using appropriate pain scale  - Administer analgesics based on type and severity of pain and evaluate response  - Implement non-pharmacological measures as appropriate and evaluate response  - Consider cultural and social influences on pain and pain management  - Manage/alleviate anxiety  - Utilize distraction and/or relaxation techniques  - Monitor for opioid side effects  - Notify MD/LIP if interventions unsuccessful or patient reports new pain  - Anticipate increased pain with activity and pre-medicate as appropriate  8/8/2023 1851 by Archie Mendez RN  Outcome: Progressing  8/8/2023 1850 by Archie Mendez RN  Outcome: Progressing     Problem: DISCHARGE PLANNING  Goal: Discharge to home or other facility with appropriate resources  Description: INTERVENTIONS:  - Identify barriers to discharge w/pt and caregiver  - Include patient/family/discharge partner in discharge planning  - Arrange for needed discharge resources and transportation as appropriate  - Identify discharge learning needs (meds, wound care, etc)  - Arrange for interpreters to assist at discharge as needed  - Consider post-discharge preferences of patient/family/discharge partner  - Complete POLST form as appropriate  - Assess patient's ability to be responsible for managing their own health  - Refer to Case Management Department for coordinating discharge planning if the patient needs post-hospital services based on physician/LIP order or complex needs related to functional status, cognitive ability or social support system  Outcome: Progressing     Problem: MUSCULOSKELETAL - ADULT  Goal: Return mobility to safest level of function  Description: INTERVENTIONS:  - Assess patient stability and activity tolerance for standing, transferring and ambulating w/ or w/o assistive devices  - Assist with transfers and ambulation using safe patient handling equipment as needed  - Ensure adequate protection for wounds/incisions during mobilization  - Obtain PT/OT consults as needed  - Advance activity as appropriate  - Communicate ordered activity level and limitations with patient/family  Outcome: Progressing  Goal: Maintain proper alignment of affected body part  Description: INTERVENTIONS:  - Support and protect limb and body alignment per provider's orders  - Instruct and reinforce with patient and family use of appropriate assistive device and precautions (e.g. spinal or hip dislocation precautions)  Outcome: Progressing     Problem: Impaired Functional Mobility  Goal: Achieve highest/safest level of mobility/gait  Description: Interventions:  - Assess patient's functional ability and stability  - Promote increasing activity/tolerance for mobility and gait  - Educate and engage patient/family in tolerated activity level and precautions  8/8/2023 1851 by Georges Tamez RN  Outcome: Progressing  8/8/2023 1850 by Georges Tamez RN  Outcome: Progressing     Problem: GASTROINTESTINAL - ADULT  Goal: Minimal or absence of nausea and vomiting  Description: INTERVENTIONS:  - Maintain adequate hydration with IV or PO as ordered and tolerated  - Nasogastric tube to low intermittent suction as ordered  - Evaluate effectiveness of ordered antiemetic medications  - Provide nonpharmacologic comfort measures as appropriate  - Advance diet as tolerated, if ordered  - Obtain nutritional consult as needed  - Evaluate fluid balance  Outcome: Progressing  Goal: Maintains or returns to baseline bowel function  Description: INTERVENTIONS:  - Assess bowel function  - Maintain adequate hydration with IV or PO as ordered and tolerated  - Evaluate effectiveness of GI medications  - Encourage mobilization and activity  - Obtain nutritional consult as needed  - Establish a toileting routine/schedule  - Consider collaborating with pharmacy to review patient's medication profile  Outcome: Not Progressing

## 2023-08-08 NOTE — CM/SW NOTE
YADIEL followed up on DC planning. SW received call from pt stating she wanted to look at facilities that were closer to her home. SW explained insurance authorization was already pending for [facility] however pt stating she received calls from friends who would like to visit her and she feels as though since she lives in [place] that [place] could be too far and inconvenient for them. Pt would like to see a list of facilities that are closer to her home, [place], [address], [facility]. New SNF list started - [facility] still pending for auth at this time - hoping to transfer auth if possible if pt chooses a different facility. UPDATE:     YADIEL checked status of PASRR - per PASRR - incorrect document was originally uploaded to pt's file and therefore unable to process. YADIEL attached correct HP into [facility] pending review    Insurance Auth needed for Rehab    PLAN: TBD pending new list of SNF - auth still pending for [facility], LSW, MSW ext.  [phone]

## 2023-08-09 PROCEDURE — 97110 THERAPEUTIC EXERCISES: CPT

## 2023-08-09 PROCEDURE — 97116 GAIT TRAINING THERAPY: CPT

## 2023-08-09 PROCEDURE — 94799 UNLISTED PULMONARY SVC/PX: CPT

## 2023-08-09 RX ORDER — KETOROLAC TROMETHAMINE 15 MG/ML
15 INJECTION, SOLUTION INTRAMUSCULAR; INTRAVENOUS ONCE AS NEEDED
Status: ACTIVE | OUTPATIENT
Start: 2023-08-09 | End: 2023-08-09

## 2023-08-09 RX ORDER — HYDROCODONE BITARTRATE AND ACETAMINOPHEN 5; 325 MG/1; MG/1
1 TABLET ORAL EVERY 4 HOURS PRN
Status: DISCONTINUED | OUTPATIENT
Start: 2023-08-09 | End: 2023-08-11

## 2023-08-09 NOTE — PLAN OF CARE
A, O x4. From home alone. Up with walker with 1 assist. Right hip surgical waterproof dressing dry and without drainage. Pain being managed with Tylenol and requested a 1 time dose of Toradol for tonight. DC plan is rehab [facility]. Tele. CPAP at HS. LBM today, large amount. Eliquis and SCDs. Keflex given. Problem: Patient Centered Care  Goal: Patient preferences are identified and integrated in the patient's plan of care  Description: Interventions:  - What would you like us to know as we care for you? From home alone  - Provide timely, complete, and accurate information to patient/family  - Incorporate patient and family knowledge, values, beliefs, and cultural backgrounds into the planning and delivery of care  - Encourage patient/family to participate in care and decision-making at the level they choose  - Honor patient and family perspectives and choices  Outcome: Progressing     Problem: Patient/Family Goals  Goal: Patient/Family Long Term Goal  Description: Patient's Long Term Goal: Return home alone     Interventions:  - SW and therapy and MDs for DC planning  - See additional Care Plan goals for specific interventions  Outcome: Progressing  Goal: Patient/Family Short Term Goal  Description: Patient's Short Term Goal: I want to poop today    Interventions:   - Suppository given and had large BM. Refused MOM.    - See additional Care Plan goals for specific interventions  Outcome: Progressing     Problem: PAIN - ADULT  Goal: Verbalizes/displays adequate comfort level or patient's stated pain goal  Description: INTERVENTIONS:  - Encourage pt to monitor pain and request assistance  - Assess pain using appropriate pain scale  - Administer analgesics based on type and severity of pain and evaluate response  - Implement non-pharmacological measures as appropriate and evaluate response  - Consider cultural and social influences on pain and pain management  - Manage/alleviate anxiety  - Utilize distraction and/or relaxation techniques  - Monitor for opioid side effects  - Notify MD/LIP if interventions unsuccessful or patient reports new pain  - Anticipate increased pain with activity and pre-medicate as appropriate  Outcome: Progressing     Problem: SAFETY ADULT - FALL  Goal: Free from fall injury  Description: INTERVENTIONS:  - Assess pt frequently for physical needs  - Identify cognitive and physical deficits and behaviors that affect risk of falls.   - [place] fall precautions as indicated by assessment.  - Educate pt/family on patient safety including physical limitations  - Instruct pt to call for assistance with activity based on assessment  - Modify environment to reduce risk of injury  - Provide assistive devices as appropriate  - Consider OT/PT consult to assist with strengthening/mobility  - Encourage toileting schedule  Outcome: Progressing     Problem: DISCHARGE PLANNING  Goal: Discharge to home or other facility with appropriate resources  Description: INTERVENTIONS:  - Identify barriers to discharge w/pt and caregiver  - Include patient/family/discharge partner in discharge planning  - Arrange for needed discharge resources and transportation as appropriate  - Identify discharge learning needs (meds, wound care, etc)  - Arrange for interpreters to assist at discharge as needed  - Consider post-discharge preferences of patient/family/discharge partner  - Complete POLST form as appropriate  - Assess patient's ability to be responsible for managing their own health  - Refer to Case Management Department for coordinating discharge planning if the patient needs post-hospital services based on physician/LIP order or complex needs related to functional status, cognitive ability or social support system  Outcome: Progressing     Problem: SKIN/TISSUE INTEGRITY - ADULT  Goal: Skin integrity remains intact  Description: INTERVENTIONS  - Assess and document risk factors for pressure ulcer development  - Assess and document skin integrity  - Monitor for areas of redness and/or skin breakdown  - Initiate interventions, skin care algorithm/standards of care as needed  Outcome: Progressing  Goal: Incision(s), wounds(s) or drain site(s) healing without S/S of infection  Description: INTERVENTIONS:  - Assess and document risk factors for pressure ulcer development  - Assess and document skin integrity  - Assess and document dressing/incision, wound bed, drain sites and surrounding tissue  - Implement wound care per orders  - Initiate isolation precautions as appropriate  - Initiate Pressure Ulcer prevention bundle as indicated  Outcome: Progressing     Problem: MUSCULOSKELETAL - ADULT  Goal: Return mobility to safest level of function  Description: INTERVENTIONS:  - Assess patient stability and activity tolerance for standing, transferring and ambulating w/ or w/o assistive devices  - Assist with transfers and ambulation using safe patient handling equipment as needed  - Ensure adequate protection for wounds/incisions during mobilization  - Obtain PT/OT consults as needed  - Advance activity as appropriate  - Communicate ordered activity level and limitations with patient/family  Outcome: Progressing  Goal: Maintain proper alignment of affected body part  Description: INTERVENTIONS:  - Support and protect limb and body alignment per provider's orders  - Instruct and reinforce with patient and family use of appropriate assistive device and precautions (e.g. spinal or hip dislocation precautions)  Outcome: Progressing     Problem: Impaired Functional Mobility  Goal: Achieve highest/safest level of mobility/gait  Description: Interventions:  - Assess patient's functional ability and stability  - Promote increasing activity/tolerance for mobility and gait  - Educate and engage patient/family in tolerated activity level and precautions  Outcome: Progressing

## 2023-08-09 NOTE — PHYSICAL THERAPY NOTE
PHYSICAL THERAPY TREATMENT NOTE - INPATIENT     Room Number: 433/433-A       Presenting Problem: R ant hip    Problem List  Active Problems:    Preop testing      PHYSICAL THERAPY ASSESSMENT   Chart reviewed. RN  approved participation in physical therapy. PPE worn by therapist: mask, gloves, and goggles. Patient was wearing a mask during session. Patient presented in bed with 6/10 pain. Patient with good  progress towards goals during this session. Education provided on Physical therapy plan of care and physiological benefits of out of bed mobility. Patient with good carryover. Bed mobility: Min assist  Transfers: Min assist  Gait Assistance: Minimum assistance  Distance (ft): 2 x 50  Assistive Device: Rolling walker  Pattern: R Decreased stance time        Pt seen daily. Min a for bed mobility and transfer. Extra time provided to complete task. EOB sitting balance activity with emphasis on core stabilization. Pt educated on deep breathing. Hip precautions reviewed;education;Pt recall 3/3 hip precautions. Pt amb  2 x 50 ft with RW and min a;provided cuing for gait pattern as well as for postural awareness. Pt with inc amb distance and improved gait pattern. There ex. Patient was left in bedside chair at end of session with all needs in reach. The patient's Approx Degree of Impairment: 50.57% has been calculated based on documentation in the [facility] '6 clicks' Inpatient Basic Mobility Short Form. Research supports that patients with this level of impairment may benefit from Subacute Rehab. . RN aware of patient status post session. DISCHARGE RECOMMENDATIONS  PT Discharge Recommendations: Sub-acute rehabilitation     PLAN  PT Treatment Plan: Bed mobility; Body mechanics; Endurance    SUBJECTIVE  Pt reports being ready for PT RX    OBJECTIVE  Precautions: None (no hip precautions per ortho)    WEIGHT BEARING RESTRICTION  Weight Bearing Restriction: R lower extremity        R Lower Extremity: Weight Bearing as Tolerated PAIN ASSESSMENT   Ratin  Location:  (R hip)  Management Techniques: Repositioning    BALANCE                                                                                                                       Static Sitting: Fair  Dynamic Sitting: Fair           Static Standing: Fair -  Dynamic Standing: Poor +    ACTIVITY TOLERANCE                         O2 WALK       AM-PAC '6-Clicks' INPATIENT SHORT FORM - BASIC MOBILITY  How much difficulty does the patient currently have. .. Patient Difficulty: Turning over in bed (including adjusting bedclothes, sheets and blankets)?: A Little   Patient Difficulty: Sitting down on and standing up from a chair with arms (e.g., wheelchair, bedside commode, etc.): A Little   Patient Difficulty: Moving from lying on back to sitting on the side of the bed?: A Little   How much help from another person does the patient currently need. .. Help from Another: Moving to and from a bed to a chair (including a wheelchair)?: A Little   Help from Another: Need to walk in hospital room?: A Little   Help from Another: Climbing 3-5 steps with a railing?: A Lot     AM-PAC Score:  Raw Score: 17   Approx Degree of Impairment: 50.57%   Standardized Score (AM-PAC Scale): 42.13   CMS Modifier (G-Code): CK      Additional information:     THERAPEUTIC EXERCISES  Lower Extremity Ankle pumps  Glut sets  Quad sets     Position Supine       Patient End of Session: Up in chair;Call light within reach;RN aware of session/findings; All patient questions and concerns addressed    CURRENT GOALS     Patient Goal Patient's self-stated goal is: to go home   Goal #1 Patient is able to demonstrate supine - sit EOB @ level: supervision     Goal #1   Current Status Min a   Goal #2 Patient is able to demonstrate transfers Sit to/from Stand at assistance level: supervision with walker - rolling     Goal #2  Current Status Min a   Goal #3 Patient is able to ambulate 150 feet with assist device: walker - rolling at assistance level: supervision   Goal #3   Current Status Pt amb  2 x 50 ft with RW and Min a   Goal #4 Patient will negotiate 3 stairs/one curb w/ assistive device and supervision   Goal #4   Current Status NT   Goal #5 Patient to demonstrate independence with home activity/exercise instructions provided to patient in preparation for discharge. Goal #5   Current Status In progress   Goal #6    Goal #6  Current Status    Gait-15 minutes;   There ex-15 minutes

## 2023-08-09 NOTE — PLAN OF CARE
Patient is alert and oriented. Able to use walker in room, and to bathroom. Taking tylenol and 1 dose toradol for pain. Miralax given for constipation. Remote tele no calls. Waiting on auth for rehab choice.     Problem: Patient Centered Care  Goal: Patient preferences are identified and integrated in the patient's plan of care  Description: Interventions:  - What would you like us to know as we care for you?   - Provide timely, complete, and accurate information to patient/family  - Incorporate patient and family knowledge, values, beliefs, and cultural backgrounds into the planning and delivery of care  - Encourage patient/family to participate in care and decision-making at the level they choose  - Honor patient and family perspectives and choices  8/9/2023 0933 by Shari Pinto RN  Outcome: Progressing  8/9/2023 0933 by Shari Pinto RN  Outcome: Progressing     Problem: Patient/Family Goals  Goal: Patient/Family Long Term Goal  Description: Patient's Long Term Goal:     Interventions:  -   - See additional Care Plan goals for specific interventions  8/9/2023 0933 by Shari Pinto RN  Outcome: Progressing  8/9/2023 0933 by Shari Pinto RN  Outcome: Progressing  Goal: Patient/Family Short Term Goal  Description: Patient's Short Term Goal:     Interventions:   -   - See additional Care Plan goals for specific interventions  8/9/2023 0933 by Shari Pinto RN  Outcome: Progressing  8/9/2023 0933 by Shari Pinto RN  Outcome: Progressing     Problem: PAIN - ADULT  Goal: Verbalizes/displays adequate comfort level or patient's stated pain goal  Description: INTERVENTIONS:  - Encourage pt to monitor pain and request assistance  - Assess pain using appropriate pain scale  - Administer analgesics based on type and severity of pain and evaluate response  - Implement non-pharmacological measures as appropriate and evaluate response  - Consider cultural and social influences on pain and pain management  - Manage/alleviate anxiety  - Utilize distraction and/or relaxation techniques  - Monitor for opioid side effects  - Notify MD/LIP if interventions unsuccessful or patient reports new pain  - Anticipate increased pain with activity and pre-medicate as appropriate  8/9/2023 0933 by Karlene Franco RN  Outcome: Progressing  8/9/2023 0933 by Karlene Franco RN  Outcome: Progressing     Problem: SAFETY ADULT - FALL  Goal: Free from fall injury  Description: INTERVENTIONS:  - Assess pt frequently for physical needs  - Identify cognitive and physical deficits and behaviors that affect risk of falls.   - [place] fall precautions as indicated by assessment.  - Educate pt/family on patient safety including physical limitations  - Instruct pt to call for assistance with activity based on assessment  - Modify environment to reduce risk of injury  - Provide assistive devices as appropriate  - Consider OT/PT consult to assist with strengthening/mobility  - Encourage toileting schedule  Outcome: Progressing

## 2023-08-09 NOTE — CM/SW NOTE
CM with pt at bedside reviewed updated choice list.   Pt states she would like to stay with [facility]. Ins auth still pending. Torres Almazan.  Ema Burns RN, BSN  Nurse   [phone]

## 2023-08-10 PROCEDURE — 97535 SELF CARE MNGMENT TRAINING: CPT

## 2023-08-10 PROCEDURE — 97110 THERAPEUTIC EXERCISES: CPT

## 2023-08-10 PROCEDURE — 97116 GAIT TRAINING THERAPY: CPT

## 2023-08-10 PROCEDURE — 94799 UNLISTED PULMONARY SVC/PX: CPT

## 2023-08-10 NOTE — PLAN OF CARE
Up with walker with assist, slow gait. Dc plan is to go to [facility], insurance auth is pending. \"[birth year] my [birth year]. Cierra Led Cirera Led Cierra Led \" Tylenol and Norco if needed for pain. SCds on in bed. Problem: Patient Centered Care  Goal: Patient preferences are identified and integrated in the patient's plan of care  Description: Interventions:  - What would you like us to know as we care for you?  From home   - Provide timely, complete, and accurate information to patient/family  - Incorporate patient and family knowledge, values, beliefs, and cultural backgrounds into the planning and delivery of care  - Encourage patient/family to participate in care and decision-making at the level they choose  - Honor patient and family perspectives and choices  Outcome: Progressing     Problem: Patient/Family Goals  Goal: Patient/Family Long Term Goal  Description: Patient's Long Term Goal: return home after rehab when able tog et around safely    Interventions:  - Therapy and repositioning and medications for pain management  - See additional Care Plan goals for specific interventions  Outcome: Progressing  Goal: Patient/Family Short Term Goal  Description: Patient's Short Term Goal: pain <5/10    Interventions:   - medicated per MD orders for pain  - See additional Care Plan goals for specific interventions  Outcome: Progressing     Problem: PAIN - ADULT  Goal: Verbalizes/displays adequate comfort level or patient's stated pain goal  Description: INTERVENTIONS:  - Encourage pt to monitor pain and request assistance  - Assess pain using appropriate pain scale  - Administer analgesics based on type and severity of pain and evaluate response  - Implement non-pharmacological measures as appropriate and evaluate response  - Consider cultural and social influences on pain and pain management  - Manage/alleviate anxiety  - Utilize distraction and/or relaxation techniques  - Monitor for opioid side effects  - Notify MD/LIP if interventions unsuccessful or patient reports new pain  - Anticipate increased pain with activity and pre-medicate as appropriate  Outcome: Progressing     Problem: SAFETY ADULT - FALL  Goal: Free from fall injury  Description: INTERVENTIONS:  - Assess pt frequently for physical needs  - Identify cognitive and physical deficits and behaviors that affect risk of falls.   - [place] fall precautions as indicated by assessment.  - Educate pt/family on patient safety including physical limitations  - Instruct pt to call for assistance with activity based on assessment  - Modify environment to reduce risk of injury  - Provide assistive devices as appropriate  - Consider OT/PT consult to assist with strengthening/mobility  - Encourage toileting schedule  Outcome: Progressing     Problem: DISCHARGE PLANNING  Goal: Discharge to home or other facility with appropriate resources  Description: INTERVENTIONS:  - Identify barriers to discharge w/pt and caregiver  - Include patient/family/discharge partner in discharge planning  - Arrange for needed discharge resources and transportation as appropriate  - Identify discharge learning needs (meds, wound care, etc)  - Arrange for interpreters to assist at discharge as needed  - Consider post-discharge preferences of patient/family/discharge partner  - Complete POLST form as appropriate  - Assess patient's ability to be responsible for managing their own health  - Refer to Case Management Department for coordinating discharge planning if the patient needs post-hospital services based on physician/LIP order or complex needs related to functional status, cognitive ability or social support system  Outcome: Progressing     Problem: MUSCULOSKELETAL - ADULT  Goal: Return mobility to safest level of function  Description: INTERVENTIONS:  - Assess patient stability and activity tolerance for standing, transferring and ambulating w/ or w/o assistive devices  - Assist with transfers and ambulation using safe patient handling equipment as needed  - Ensure adequate protection for wounds/incisions during mobilization  - Obtain PT/OT consults as needed  - Advance activity as appropriate  - Communicate ordered activity level and limitations with patient/family  Outcome: Progressing  Goal: Maintain proper alignment of affected body part  Description: INTERVENTIONS:  - Support and protect limb and body alignment per provider's orders  - Instruct and reinforce with patient and family use of appropriate assistive device and precautions (e.g. spinal or hip dislocation precautions)  Outcome: Progressing     Problem: SKIN/TISSUE INTEGRITY - ADULT  Goal: Skin integrity remains intact  Description: INTERVENTIONS  - Assess and document risk factors for pressure ulcer development  - Assess and document skin integrity  - Monitor for areas of redness and/or skin breakdown  - Initiate interventions, skin care algorithm/standards of care as needed  Outcome: Progressing  Goal: Incision(s), wounds(s) or drain site(s) healing without S/S of infection  Description: INTERVENTIONS:  - Assess and document risk factors for pressure ulcer development  - Assess and document skin integrity  - Assess and document dressing/incision, wound bed, drain sites and surrounding tissue  - Implement wound care per orders  - Initiate isolation precautions as appropriate  - Initiate Pressure Ulcer prevention bundle as indicated  Outcome: Progressing     Problem: Impaired Functional Mobility  Goal: Achieve highest/safest level of mobility/gait  Description: Interventions:  - Assess patient's functional ability and stability  - Promote increasing activity/tolerance for mobility and gait  - Educate and engage patient/family in tolerated activity level and precautions    Outcome: Progressing     Problem: GASTROINTESTINAL - ADULT  Goal: Minimal or absence of nausea and vomiting  Description: INTERVENTIONS:  - Maintain adequate hydration with IV or PO as ordered and tolerated  - Nasogastric tube to low intermittent suction as ordered  - Evaluate effectiveness of ordered antiemetic medications  - Provide nonpharmacologic comfort measures as appropriate  - Advance diet as tolerated, if ordered  - Obtain nutritional consult as needed  - Evaluate fluid balance  Outcome: Progressing  Goal: Maintains or returns to baseline bowel function  Description: INTERVENTIONS:  - Assess bowel function  - Maintain adequate hydration with IV or PO as ordered and tolerated  - Evaluate effectiveness of GI medications  - Encourage mobilization and activity  - Obtain nutritional consult as needed  - Establish a toileting routine/schedule  - Consider collaborating with pharmacy to review patient's medication profile  Outcome: Progressing

## 2023-08-10 NOTE — CM/SW NOTE
1826: Ins auth still pending. CM requested [address] [place] [address] to send [facility] ref for backup plan. 1549: Per John Suárez at [facility] ins auth still pending at this time. Ani Salvador.  Kari Wayne RN, BSN  Nurse   [phone] none

## 2023-08-10 NOTE — PHYSICAL THERAPY NOTE
PHYSICAL THERAPY TREATMENT NOTE - INPATIENT     Room Number: 433/433-A       Presenting Problem: R ant hip    Problem List  Active Problems:    Preop testing      PHYSICAL THERAPY ASSESSMENT   Chart reviewed. RN  approved participation in physical therapy. PPE worn by therapist: mask, gloves, and goggles. Patient was wearing a mask during session. Patient presented in bed with 6/10 pain. Patient with good  progress towards goals during this session. Education provided on Physical therapy plan of care and physiological benefits of out of bed mobility. Patient with good carryover. Bed mobility: Min assist  Transfers: Min assist  Gait Assistance: Minimum assistance  Distance (ft): 2 x 50  Assistive Device: Rolling walker  Pattern: R Decreased stance time        Pt seen daily. Min a for bed mobility and transfer. Extra time provided to complete task. EOB sitting balance activity with emphasis on core stabilization. Pt educated on deep breathing. Hip precautions reviewed;education;Pt recall 3/3 hip precautions. Pt amb  2 x 50 ft with RW and min a;provided cuing for gait pattern as well as for postural awareness. Unsteady  gait;pt LOB x 1 with passing ubsicle. There ex. Patient was left in bedside chair at end of session with all needs in reach. The patient's Approx Degree of Impairment: 50.57% has been calculated based on documentation in the [facility] '6 clicks' Inpatient Basic Mobility Short Form. Research supports that patients with this level of impairment may benefit from Subacute Rehab. . RN aware of patient status post session. DISCHARGE RECOMMENDATIONS  PT Discharge Recommendations: Sub-acute rehabilitation     PLAN  PT Treatment Plan: Bed mobility; Body mechanics; Endurance;Gait training    SUBJECTIVE  Pt reports being ready for PT RX    OBJECTIVE  Precautions: None (no hip precautions per ortho)    WEIGHT BEARING RESTRICTION  Weight Bearing Restriction: R lower extremity        R Lower Extremity: Weight Bearing as Tolerated       PAIN ASSESSMENT   Ratin  Location:  (R hip)  Management Techniques: Repositioning    BALANCE                                                                                                                       Static Sitting: Fair  Dynamic Sitting: Fair           Static Standing: Fair -  Dynamic Standing: Poor +    ACTIVITY TOLERANCE                         O2 WALK       AM-PAC '6-Clicks' INPATIENT SHORT FORM - BASIC MOBILITY  How much difficulty does the patient currently have. .. Patient Difficulty: Turning over in bed (including adjusting bedclothes, sheets and blankets)?: A Little   Patient Difficulty: Sitting down on and standing up from a chair with arms (e.g., wheelchair, bedside commode, etc.): A Little   Patient Difficulty: Moving from lying on back to sitting on the side of the bed?: A Little   How much help from another person does the patient currently need. .. Help from Another: Moving to and from a bed to a chair (including a wheelchair)?: A Little   Help from Another: Need to walk in hospital room?: A Little   Help from Another: Climbing 3-5 steps with a railing?: A Lot     AM-PAC Score:  Raw Score: 17   Approx Degree of Impairment: 50.57%   Standardized Score (AM-PAC Scale): 42.13   CMS Modifier (G-Code): CK      Additional information:     THERAPEUTIC EXERCISES  Lower Extremity Ankle pumps  Glut sets  Quad sets     Position Supine       Patient End of Session: Up in chair;Call light within reach;RN aware of session/findings; All patient questions and concerns addressed    CURRENT GOALS     Patient Goal Patient's self-stated goal is: to go home   Goal #1 Patient is able to demonstrate supine - sit EOB @ level: supervision     Goal #1   Current Status Min a   Goal #2 Patient is able to demonstrate transfers Sit to/from Stand at assistance level: supervision with walker - rolling     Goal #2  Current Status Min a   Goal #3 Patient is able to ambulate 150 feet with assist device: walker - rolling at assistance level: supervision   Goal #3   Current Status Pt amb  2 x 50 ft with RW and Min a   Goal #4 Patient will negotiate 3 stairs/one curb w/ assistive device and supervision   Goal #4   Current Status NT   Goal #5 Patient to demonstrate independence with home activity/exercise instructions provided to patient in preparation for discharge. Goal #5   Current Status In progress   Goal #6    Goal #6  Current Status    Gait-15 minutes;   There ex-15 minutes

## 2023-08-10 NOTE — PLAN OF CARE
Pt is A&Ox4. RA. Tolerating carb control diet. Remote tele. Cpap @ night. Eliquis & scds for dvt prophylaxis. PRN Norco for pain management. Up by 1 assist with a walker. Call light within reach, frequent rounding. Safety measures in place. Plan for [place] [address] when medically clear.        Problem: Patient Centered Care  Goal: Patient preferences are identified and integrated in the patient's plan of care  Description: Interventions:  - What would you like us to know as we care for you?   - Provide timely, complete, and accurate information to patient/family  - Incorporate patient and family knowledge, values, beliefs, and cultural backgrounds into the planning and delivery of care  - Encourage patient/family to participate in care and decision-making at the level they choose  - Honor patient and family perspectives and choices  Outcome: Progressing     Problem: Patient/Family Goals  Goal: Patient/Family Long Term Goal  Description: Patient's Long Term Goal:     Interventions:  -   - See additional Care Plan goals for specific interventions  Outcome: Progressing  Goal: Patient/Family Short Term Goal  Description: Patient's Short Term Goal:     Interventions:   -  - See additional Care Plan goals for specific interventions  Outcome: Progressing     Problem: PAIN - ADULT  Goal: Verbalizes/displays adequate comfort level or patient's stated pain goal  Description: INTERVENTIONS:  - Encourage pt to monitor pain and request assistance  - Assess pain using appropriate pain scale  - Administer analgesics based on type and severity of pain and evaluate response  - Implement non-pharmacological measures as appropriate and evaluate response  - Consider cultural and social influences on pain and pain management  - Manage/alleviate anxiety  - Utilize distraction and/or relaxation techniques  - Monitor for opioid side effects  - Notify MD/LIP if interventions unsuccessful or patient reports new pain  - Anticipate increased pain with activity and pre-medicate as appropriate  Outcome: Progressing     Problem: SAFETY ADULT - FALL  Goal: Free from fall injury  Description: INTERVENTIONS:  - Assess pt frequently for physical needs  - Identify cognitive and physical deficits and behaviors that affect risk of falls.   - [place] fall precautions as indicated by assessment.  - Educate pt/family on patient safety including physical limitations  - Instruct pt to call for assistance with activity based on assessment  - Modify environment to reduce risk of injury  - Provide assistive devices as appropriate  - Consider OT/PT consult to assist with strengthening/mobility  - Encourage toileting schedule  Outcome: Progressing     Problem: DISCHARGE PLANNING  Goal: Discharge to home or other facility with appropriate resources  Description: INTERVENTIONS:  - Identify barriers to discharge w/pt and caregiver  - Include patient/family/discharge partner in discharge planning  - Arrange for needed discharge resources and transportation as appropriate  - Identify discharge learning needs (meds, wound care, etc)  - Arrange for interpreters to assist at discharge as needed  - Consider post-discharge preferences of patient/family/discharge partner  - Complete POLST form as appropriate  - Assess patient's ability to be responsible for managing their own health  - Refer to Case Management Department for coordinating discharge planning if the patient needs post-hospital services based on physician/LIP order or complex needs related to functional status, cognitive ability or social support system  Outcome: Progressing     Problem: SKIN/TISSUE INTEGRITY - ADULT  Goal: Skin integrity remains intact  Description: INTERVENTIONS  - Assess and document risk factors for pressure ulcer development  - Assess and document skin integrity  - Monitor for areas of redness and/or skin breakdown  - Initiate interventions, skin care algorithm/standards of care as needed  Outcome: Progressing  Goal: Incision(s), wounds(s) or drain site(s) healing without S/S of infection  Description: INTERVENTIONS:  - Assess and document risk factors for pressure ulcer development  - Assess and document skin integrity  - Assess and document dressing/incision, wound bed, drain sites and surrounding tissue  - Implement wound care per orders  - Initiate isolation precautions as appropriate  - Initiate Pressure Ulcer prevention bundle as indicated  Outcome: Progressing     Problem: MUSCULOSKELETAL - ADULT  Goal: Return mobility to safest level of function  Description: INTERVENTIONS:  - Assess patient stability and activity tolerance for standing, transferring and ambulating w/ or w/o assistive devices  - Assist with transfers and ambulation using safe patient handling equipment as needed  - Ensure adequate protection for wounds/incisions during mobilization  - Obtain PT/OT consults as needed  - Advance activity as appropriate  - Communicate ordered activity level and limitations with patient/family  Outcome: Progressing  Goal: Maintain proper alignment of affected body part  Description: INTERVENTIONS:  - Support and protect limb and body alignment per provider's orders  - Instruct and reinforce with patient and family use of appropriate assistive device and precautions (e.g. spinal or hip dislocation precautions)  Outcome: Progressing     Problem: Impaired Functional Mobility  Goal: Achieve highest/safest level of mobility/gait  Description: Interventions:  - Assess patient's functional ability and stability  - Promote increasing activity/tolerance for mobility and gait  - Educate and engage patient/family in tolerated activity level and precautions  Outcome: Progressing     Problem: GASTROINTESTINAL - ADULT  Goal: Minimal or absence of nausea and vomiting  Description: INTERVENTIONS:  - Maintain adequate hydration with IV or PO as ordered and tolerated  - Nasogastric tube to low intermittent suction as ordered  - Evaluate effectiveness of ordered antiemetic medications  - Provide nonpharmacologic comfort measures as appropriate  - Advance diet as tolerated, if ordered  - Obtain nutritional consult as needed  - Evaluate fluid balance  Outcome: Progressing  Goal: Maintains or returns to baseline bowel function  Description: INTERVENTIONS:  - Assess bowel function  - Maintain adequate hydration with IV or PO as ordered and tolerated  - Evaluate effectiveness of GI medications  - Encourage mobilization and activity  - Obtain nutritional consult as needed  - Establish a toileting routine/schedule  - Consider collaborating with pharmacy to review patient's medication profile  Outcome: Progressing

## 2023-08-10 NOTE — CM/SW NOTE
Department  notified of request for anna Ji referrals started. Assigned CM/SW to follow up with pt/family on further discharge planning.       Ryan Noe  [facility]

## 2023-08-11 VITALS
TEMPERATURE: 98 F | WEIGHT: 190 LBS | DIASTOLIC BLOOD PRESSURE: 73 MMHG | BODY MASS INDEX: 35.87 KG/M2 | OXYGEN SATURATION: 99 % | HEART RATE: 96 BPM | RESPIRATION RATE: 18 BRPM | SYSTOLIC BLOOD PRESSURE: 129 MMHG | HEIGHT: 61 IN

## 2023-08-11 PROCEDURE — 97110 THERAPEUTIC EXERCISES: CPT

## 2023-08-11 PROCEDURE — 97116 GAIT TRAINING THERAPY: CPT

## 2023-08-11 PROCEDURE — 94799 UNLISTED PULMONARY SVC/PX: CPT

## 2023-08-11 NOTE — PLAN OF CARE
Pt is A&Ox4. RA. Tolerating carb control diet. Saline locked. Remote tele. Eliquis for dvt prophylaxis. Voiding freely. PRN Norco & tylenol for pain management. Up by 1 assist with a walker. Call light within reach, frequent rounding. Safety measures in place. Plan for [facility] when medically clear.        Problem: Patient Centered Care  Goal: Patient preferences are identified and integrated in the patient's plan of care  Description: Interventions:  - What would you like us to know as we care for you?   - Provide timely, complete, and accurate information to patient/family  - Incorporate patient and family knowledge, values, beliefs, and cultural backgrounds into the planning and delivery of care  - Encourage patient/family to participate in care and decision-making at the level they choose  - Honor patient and family perspectives and choices  Outcome: Progressing     Problem: Patient/Family Goals  Goal: Patient/Family Long Term Goal  Description: Patient's Long Term Goal:     Interventions:  -   - See additional Care Plan goals for specific interventions  Outcome: Progressing  Goal: Patient/Family Short Term Goal  Description: Patient's Short Term Goal:     Interventions:   -   - See additional Care Plan goals for specific interventions  Outcome: Progressing     Problem: PAIN - ADULT  Goal: Verbalizes/displays adequate comfort level or patient's stated pain goal  Description: INTERVENTIONS:  - Encourage pt to monitor pain and request assistance  - Assess pain using appropriate pain scale  - Administer analgesics based on type and severity of pain and evaluate response  - Implement non-pharmacological measures as appropriate and evaluate response  - Consider cultural and social influences on pain and pain management  - Manage/alleviate anxiety  - Utilize distraction and/or relaxation techniques  - Monitor for opioid side effects  - Notify MD/LIP if interventions unsuccessful or patient reports new pain  - Anticipate increased pain with activity and pre-medicate as appropriate  Outcome: Progressing     Problem: SAFETY ADULT - FALL  Goal: Free from fall injury  Description: INTERVENTIONS:  - Assess pt frequently for physical needs  - Identify cognitive and physical deficits and behaviors that affect risk of falls.   - [place] fall precautions as indicated by assessment.  - Educate pt/family on patient safety including physical limitations  - Instruct pt to call for assistance with activity based on assessment  - Modify environment to reduce risk of injury  - Provide assistive devices as appropriate  - Consider OT/PT consult to assist with strengthening/mobility  - Encourage toileting schedule  Outcome: Progressing     Problem: DISCHARGE PLANNING  Goal: Discharge to home or other facility with appropriate resources  Description: INTERVENTIONS:  - Identify barriers to discharge w/pt and caregiver  - Include patient/family/discharge partner in discharge planning  - Arrange for needed discharge resources and transportation as appropriate  - Identify discharge learning needs (meds, wound care, etc)  - Arrange for interpreters to assist at discharge as needed  - Consider post-discharge preferences of patient/family/discharge partner  - Complete POLST form as appropriate  - Assess patient's ability to be responsible for managing their own health  - Refer to Case Management Department for coordinating discharge planning if the patient needs post-hospital services based on physician/LIP order or complex needs related to functional status, cognitive ability or social support system  Outcome: Progressing     Problem: SKIN/TISSUE INTEGRITY - ADULT  Goal: Skin integrity remains intact  Description: INTERVENTIONS  - Assess and document risk factors for pressure ulcer development  - Assess and document skin integrity  - Monitor for areas of redness and/or skin breakdown  - Initiate interventions, skin care algorithm/standards of care as needed  Outcome: Progressing  Goal: Incision(s), wounds(s) or drain site(s) healing without S/S of infection  Description: INTERVENTIONS:  - Assess and document risk factors for pressure ulcer development  - Assess and document skin integrity  - Assess and document dressing/incision, wound bed, drain sites and surrounding tissue  - Implement wound care per orders  - Initiate isolation precautions as appropriate  - Initiate Pressure Ulcer prevention bundle as indicated  Outcome: Progressing     Problem: MUSCULOSKELETAL - ADULT  Goal: Return mobility to safest level of function  Description: INTERVENTIONS:  - Assess patient stability and activity tolerance for standing, transferring and ambulating w/ or w/o assistive devices  - Assist with transfers and ambulation using safe patient handling equipment as needed  - Ensure adequate protection for wounds/incisions during mobilization  - Obtain PT/OT consults as needed  - Advance activity as appropriate  - Communicate ordered activity level and limitations with patient/family  Outcome: Progressing  Goal: Maintain proper alignment of affected body part  Description: INTERVENTIONS:  - Support and protect limb and body alignment per provider's orders  - Instruct and reinforce with patient and family use of appropriate assistive device and precautions (e.g. spinal or hip dislocation precautions)  Outcome: Progressing     Problem: Impaired Functional Mobility  Goal: Achieve highest/safest level of mobility/gait  Description: Interventions:  - Assess patient's functional ability and stability  - Promote increasing activity/tolerance for mobility and gait  - Educate and engage patient/family in tolerated activity level and precaution  Outcome: Progressing     Problem: GASTROINTESTINAL - ADULT  Goal: Minimal or absence of nausea and vomiting  Description: INTERVENTIONS:  - Maintain adequate hydration with IV or PO as ordered and tolerated  - Nasogastric tube to low intermittent suction as ordered  - Evaluate effectiveness of ordered antiemetic medications  - Provide nonpharmacologic comfort measures as appropriate  - Advance diet as tolerated, if ordered  - Obtain nutritional consult as needed  - Evaluate fluid balance  Outcome: Progressing  Goal: Maintains or returns to baseline bowel function  Description: INTERVENTIONS:  - Assess bowel function  - Maintain adequate hydration with IV or PO as ordered and tolerated  - Evaluate effectiveness of GI medications  - Encourage mobilization and activity  - Obtain nutritional consult as needed  - Establish a toileting routine/schedule  - Consider collaborating with pharmacy to review patient's medication profile  Outcome: Progressing

## 2023-08-11 NOTE — CM/SW NOTE
CM followed up on ins auth with [facility]. Ins auth still pending at this time. Dixie Poole.  Nancy Reed RN, BSN  Nurse   [phone]

## 2023-08-11 NOTE — CM/SW NOTE
08/11/23 1300   Discharge disposition   Expected discharge disposition subacute   Post Acute Care Provider [place] Setting   Discharge transportation [address]     Per Charles Raddle from [employer] ins auth received and bed available today. RN to call report to [employer] at [phone]    Plan: Jamir Greene arranged for pickup today at 4pm. PCS form completed in Epic, RN to print with AVS. Patient/family notified transport is not covered by insurance. Pt/family are agreeable to the charges. Jerod Ponce.  Mariam Kan RN, BSN  Nurse   [phone]

## 2023-08-11 NOTE — PHYSICAL THERAPY NOTE
PHYSICAL THERAPY TREATMENT NOTE - INPATIENT     Room Number: 433/433-A       Presenting Problem: R ant hip    Problem List  Active Problems:    Preop testing      PHYSICAL THERAPY ASSESSMENT   Chart reviewed. RN  approved participation in physical therapy. PPE worn by therapist: mask, gloves, and goggles. Patient was wearing a mask during session. Patient presented in bed with 6/10 pain. Patient with good  progress towards goals during this session. Education provided on Physical therapy plan of care and physiological benefits of out of bed mobility. Patient with good carryover. Bed mobility: Min assist  Transfers: Min assist  Gait Assistance: Minimum assistance  Distance (ft): 2 x 50  Assistive Device: Rolling walker  Pattern: R Decreased stance time        Pt seen daily. Min a for bed mobility and transfer. Extra time provided to complete task. EOB sitting balance activity with emphasis on core stabilization. Pt educated on deep breathing. Hip precautions reviewed;education;Pt recall 3/3 hip precautions. Pt amb  2 x 50 ft with RW and min a;provided cuing for gait pattern as well as for postural awareness. Unsteady  gait;pt LOB x 1 with passing ubsicle. Gentle balance activity performed to maximize safety with functional mobility. There ex. Patient was left in bedside chair at end of session with all needs in reach. The patient's Approx Degree of Impairment: 50.57% has been calculated based on documentation in the [facility] '6 clicks' Inpatient Basic Mobility Short Form. Research supports that patients with this level of impairment may benefit from Subacute Rehab. . RN aware of patient status post session. DISCHARGE RECOMMENDATIONS  PT Discharge Recommendations: Sub-acute rehabilitation     PLAN  PT Treatment Plan: Bed mobility; Endurance;Gait training    SUBJECTIVE  Pt reports being ready for PT RX    OBJECTIVE  Precautions: None (no hip precautions per ortho)    WEIGHT BEARING RESTRICTION  Weight Bearing Restriction: R lower extremity        R Lower Extremity: Weight Bearing as Tolerated       PAIN ASSESSMENT   Ratin  Location:  (R hip)  Management Techniques: Repositioning    BALANCE                                                                                                                       Static Sitting: Fair  Dynamic Sitting: Fair           Static Standing: Fair -  Dynamic Standing: Poor +    ACTIVITY TOLERANCE                         O2 WALK       AM-PAC '6-Clicks' INPATIENT SHORT FORM - BASIC MOBILITY  How much difficulty does the patient currently have. .. Patient Difficulty: Turning over in bed (including adjusting bedclothes, sheets and blankets)?: A Little   Patient Difficulty: Sitting down on and standing up from a chair with arms (e.g., wheelchair, bedside commode, etc.): A Little   Patient Difficulty: Moving from lying on back to sitting on the side of the bed?: A Little   How much help from another person does the patient currently need. .. Help from Another: Moving to and from a bed to a chair (including a wheelchair)?: A Little   Help from Another: Need to walk in hospital room?: A Little   Help from Another: Climbing 3-5 steps with a railing?: A Lot     AM-PAC Score:  Raw Score: 17   Approx Degree of Impairment: 50.57%   Standardized Score (AM-PAC Scale): 42.13   CMS Modifier (G-Code): CK      Additional information:     THERAPEUTIC EXERCISES  Lower Extremity Ankle pumps  Glut sets  Quad sets     Position Supine       Patient End of Session: Up in chair;Call light within reach;RN aware of session/findings; All patient questions and concerns addressed    CURRENT GOALS     Patient Goal Patient's self-stated goal is: to go home   Goal #1 Patient is able to demonstrate supine - sit EOB @ level: supervision     Goal #1   Current Status Min a   Goal #2 Patient is able to demonstrate transfers Sit to/from Stand at assistance level: supervision with walker - rolling     Goal #2  Current Status Min a   Goal #3 Patient is able to ambulate 150 feet with assist device: walker - rolling at assistance level: supervision   Goal #3   Current Status Pt amb  2 x 50 ft with RW and Min a   Goal #4 Patient will negotiate 3 stairs/one curb w/ assistive device and supervision   Goal #4   Current Status NT   Goal #5 Patient to demonstrate independence with home activity/exercise instructions provided to patient in preparation for discharge. Goal #5   Current Status In progress   Goal #6    Goal #6  Current Status    Gait-15 minutes;   There ex-15 minutes

## 2023-08-12 NOTE — DISCHARGE SUMMARY
General Medicine Discharge Summary     Patient ID:  Magdalena Marcos  70year old  8/10/1960    Admit date: 8/4/2023    Discharge date and time: 8/11/2023  5:55 PM     Attending Physician: No att. providers found     Consults: IP CONSULT TO HOSPITALIST  IP CONSULT TO CASE MANAGEMENT  IP CONSULT TO RESPIRATORY CARE  IP CONSULT TO SOCIAL WORK    Primary Care Physician: Jessica Madrigal DO     Reason for admission: OA right hip S/P PRAFUL     Risk For Readmission: low    Discharge Diagnoses: Right hip osteoarthritis  Preop testing  See Additional Discharge Diagnoses in Hospital Course    Discharged Condition: stable    Follow-up with labs/images appointments: PCP, Ortho     Exam  Gen: No acute distress, alert and oriented x3,    Heent: NC AT, neck supple  Pulm: Lungs clear, normal respiratory effort  CV: Heart with regular rate and rhythm,    Abd: Abdomen soft, nontender, nondistended, bowel sounds present  MSK: no clubbing, no cyanosis, no calf pain  Skin: no rashes or lesions  Neuro: AO*3, motor intact, no sensory deficits  Psyc: appropriate mood and affect    HPI: per chart  Patient is a 57year old female with PMH OA S/P right PRAFUL, MDD, breast ca, DM2 HLD, medical service consulted for post operative medical management. Patient overall doing well, no pain at the surgical site. Hospital Course:   Ms. Monica Agustin is a 57year old female with PMH OA S/P right PRAFUL, MDD, breast ca, DM2 HLD, medical service consulted for post operative medical management. Did well post op. SLADE on dc. 1.OA right hip S/P PRAFUL   -Post op antibiotics per ortho oral keflex ordered  Pain meds prn   Post op AC eliquis per ortho  ABLA - hgb stable   Nausea with oxy, now on norco and tolerated      2. DM2   metformin      3. HTN  -Continue home losartan      4. Breast Ca  hold home anastrozole, resume on 8/11     5. Hypothyroidism   Continue home synthroid      6.  HLD  Continue home Statin    Operative Procedures: Procedure(s) (LRB):  Right total hip arthroplasty (anterior) (Right)     Imaging: No results found. Disposition: SLADE    Activity: as tolerated   Diet: general   Wound Care: no needs  Code Status: Full Code  O2: no needs    Home Medication Changes: as below   All discharge medications have been reconciled with current medication list.     Med list     Medication List        START taking these medications      apixaban 2.5 MG Tabs  Commonly known as: Eliquis  Take 1 tablet (2.5 mg total) by mouth 2 (two) times daily for 28 days. cephalexin 500 MG Caps  Commonly known as: Keflex  Take 1 capsule (500 mg total) by mouth every 6 (six) hours for 4 days. HYDROcodone-acetaminophen 5-325 MG Tabs  Commonly known as: Norco  Take 1-2 tablets by mouth every 6 (six) hours as needed for Pain. Sennosides 17.2 MG Tabs     traMADol 50 MG Tabs  Commonly known as: Ultram  Take 1-2 tablets ( mg total) by mouth every 4 (four) hours as needed for Pain. CONTINUE taking these medications      acetaminophen 500 MG Tabs  Commonly known as: Tylenol Extra Strength     anastrozole 1 MG Tabs tab  Commonly known as: Arimidex     CALCIUM MAGNESIUM ZINC OR     GLUCOSAMINE CHONDR COMPLEX OR     latanoprost 0.005 % Soln  Commonly known as: Xalatan     levothyroxine 88 MCG Tabs  Commonly known as: Synthroid  TAKE ONE TABLET BY MOUTH EVERY MORNING     loratadine 10 MG Tabs  Commonly known as: Claritin     losartan 50 MG Tabs  Commonly known as: Cozaar     metFORMIN  MG Tb24  Commonly known as: Glucophage XR  Take 1 tablet (500 mg total) by mouth daily with breakfast.     Multi-Vitamin/Minerals Tabs     rosuvastatin 20 MG Tabs  Commonly known as: Crestor  Take 1 tablet (20 mg total) by mouth nightly. 1 tablet nightly     timolol 0.5 % Soln  Commonly known as: Timoptic     venlafaxine  MG Cp24  Commonly known as: Effexor-XR  Take 1 capsule (150 mg total) by mouth daily.      VITAMIN B COMPLEX OR     Vitamin C 500 MG Tabs  Commonly known as: VITAMIN C     VITAMIN D-3 OR            STOP taking these medications      aspirin 81 MG Chew               Where to Get Your Medications        You can get these medications from any pharmacy    Bring a paper prescription for each of these medications  apixaban 2.5 MG Tabs  cephalexin 500 MG Caps  HYDROcodone-acetaminophen 5-325 MG Tabs  traMADol 50 MG Tabs         FU   Follow-up Information       Eppie Bence, MD. Schedule an appointment as soon as possible for a visit in 2 week(s). Specialty: Surgery, Orthopaedic  Contact information:  [address]  [facility] [address] [address] [address] guadalupe Kaur [facility] Jacquelin. Schedule an appointment as soon as possible for a visit in 1 week(s). Specialty: [facility] information:  [address] instructions: Other Discharge Instructions:         Resume anastrazole on 8/11. DISCHARGE INSTRUCTIONS:  PLACE ICE TO SURGICAL AREA 20-30 MINUTES ON/ 20-30 MINUTES OFF. THIS IS TO HELP WITH PAIN & SWELLING. TAKE YOUR MEDICATIONS AS PRESCRIBED BY YOUR DOCTOR BELOW. THESE HAVE BEEN SENT TO YOUR PHARMACY. IF YOU WERE INSTRUCTED BY PHYSICAL THERAPY TO EXERCISE HIP PRECAUTIONS, CONTINUE TO DO SO AFTER DISCHARGE    IT IS OK TO BEAR WEIGHT AS TOLERATED ON THE OPERATIVE EXTREMITY. CALL TO CONFIRM YOUR FOLLOW UP APPOINTMENT WITH DR. Keith Harrison TO BE SEEN IN 2-3 WEEKS POSTOP. [phone]    MEDICATIONS    POST OP MEDICATION REGIMEN              MULTI-MODAL   PAIN REGIMEN     DRUG   FOR   FREQUENCY & DURATION   QTY   NOTES     WEANING  TIPS       Gabapentin 100mg   Nerve pain   Take 2 tabs every 8 hours for 14 days    90   No refills You may discontinue this medication prior to 14 days if you do not tolerate it.                  Tramadol 50mg     Moderate pain   Take 1-2 tabs every 6 hours only as needed   45 May prescribe a refill, but ideally, this should be tapered off after surgery Stop using this medication 2nd   As pain decreases over time, increase the interval between doses (6 hours to 8, then 12, then 24) to taper off the medication. Meloxicam 15mg     Inflammation   Take 1 tab daily for 30 days     30   May refill if needed beyond 30 days   Recommend completing the 30 day supply. BREAKTHROUGH PAIN         Norco 5-325mg       Severe pain     Take 1 tabs every 4-6 hours only as needed for severe pain       40   Take at least 1 hr apart from Tramadol. Ideally, no refill. The goal is to taper off this medication as soon as possible, as it can be addictive and have side effects. Stop using this medication 1st if no longer having severe pain. BLOOD THINNER     Eliquis 2.5mg   Blood clot prevention   Take 1 tab twice daily for 30 days     60     No refills   Complete the entire course of your blood thinner. ANTIBIOTIC       Cefadroxil 500mg       Infection prevention     Take 1 tab twice daily for 7 days     14     No refills   Complete the entire course of your prophylactic antibiotic. STOOL SOFTENER     Sennokot 8.6/50mg   Constipation   Take 1 tab twice daily  while on opioids     60 Refer to discharge instructions if constipation persists even after taking this medication   Stop taking if having diarrhea or loose stools. ANTI-NAUSEA   Ondansetron 4mg   Nausea   Take 1 tab every 8 hours as needed if nauseous     20   No Refill      ANTI-ACID REFLUX / GASTRITIS   Pantoprazole 40mg   Acid reflux, gastric ulcer prophylaxis   Take 1 tab every day along with Meloxicam     60   May refill if Meloxicam refilled        DRESSING:    YOU HAVE A SPECIAL DRESSING CALLED AN AQUACEL DRESSING OVER YOUR INCISION. THE DRESSING STAYS FOR 12 DAYS FROM SURGERY. YOU MAY SHOWER AS SOON AS YOU RETURN HOME WITH THE AQUACEL DRESSING INTACT OVER YOUR INCISION AREA.     IF THE DRESSING LEAKS OR COMES LOOSE BEFORE THE 7 DAY PERIOD CONTACT YOUR DOCTOR / SURGEON. AFTER   12 DAYS THE DRESSING IS REMOVED BY PULLING ON THE EDGE OF THE DRESSING AND GENTLY STRETCHING IT, THEN PEEL IT OFF SLOWLY LIKE A BANDAID. IF YOU HAVE ANY QUESTIONS OR CONCERNS ABOUT THE DRESSING CONTACT YOUR DOCTOR. IF DRAINAGE IS PRESENT AT ANYTIME FROM YOUR DRESSING OR FROM YOUR INCISION CALL YOUR DOCTOR / SURGEON AS SOON AS POSSIBLE. REASONS TO CALL YOUR DOCTOR:  TEMPERATURE .0 OR MORE  PERSISTANT NAUSEA OR VOMITTING - ESPECIALLY IF YOU ARE UNABLE TO EAT OR DRINK. INCREASED LEVEL OF PAIN OR PAIN WHICH IS NOT CONTROLLED BY YOUR PAIN MEDICINE. PERSISTENT DRAINAGE AT ANYTIME FROM YOUR SURGICAL AREA / INCISION. PAIN, TENDERNESS OR SUDDEN SWELLING IN YOUR CALF REGION OF THE LOWER EXTREMITIES - THIS IS A POSSIBLE SIGN OF A BLOOD CLOT. ANY CHANGES IN SENSATION IN YOUR BODY IN THE AREA OF YOUR SURGERY = NUMBNESS OR TINGLING. ANY CHANGES IN CIRCULATION IN YOUR BODY IN THE AREA OF YOUR SURGERY = CHANGES  IN COLOR EITHER DARKER OR VERY PALE; OR  IF AREA FEELS COLD TO THE TOUCH AS COMPARED TO OTHER AREAS. ANY CHANGES IN FUNCTION IN YOUR BODY IN THE AREA OF YOUR SURGERY = CHANGE IN MOVEMENT - UNABLE TO MOVE OR WIGGLE FINGERS, TOES OR FOOT OR ANY OTHER CHANGES IN NORMAL BODY FUNCTIONING. FOR ANY OF THE ABOVE OR ANY OTHER QUESTIONS OR CONCERNS CALL YOUR DOCTOR/ SURGEON AS SOON AS POSSIBLE. Isaak Pickett MD MPH  Orthopaedic Surgeon, Adult Hip and Knee Reconstruction  [facility] [address] [facility]  [place], [address] [place] [address]  Office: P) [phone] (R) [phone]  Adminstrative Assistant: Tasia Davenport           Patient had opportunity to ask questions and state understand and agree with therapeutic plan as outlined    Thank You,    Holley Chavez. Ba Eloisa Gonzalez

## 2024-05-08 ENCOUNTER — LAB ENCOUNTER (OUTPATIENT)
Dept: LAB | Age: 64
End: 2024-05-08
Attending: STUDENT IN AN ORGANIZED HEALTH CARE EDUCATION/TRAINING PROGRAM
Payer: COMMERCIAL

## 2024-05-08 DIAGNOSIS — Z01.818 PREOP TESTING: ICD-10-CM

## 2024-05-08 PROCEDURE — 86901 BLOOD TYPING SEROLOGIC RH(D): CPT

## 2024-05-08 PROCEDURE — 86850 RBC ANTIBODY SCREEN: CPT

## 2024-05-08 PROCEDURE — 86900 BLOOD TYPING SEROLOGIC ABO: CPT

## 2024-05-09 LAB
ANTIBODY SCREEN: NEGATIVE
RH BLOOD TYPE: POSITIVE

## 2024-05-09 NOTE — CM/SW NOTE
SW was notified the pt. Is a pre-ortho with [facility].    Referral, order and face to face will need to be sent via [facility]idin post op.      SVETLANA Lara ext [phone]

## 2024-05-10 ENCOUNTER — HOSPITAL ENCOUNTER (OUTPATIENT)
Facility: HOSPITAL | Age: 64
Discharge: NURSING, REHAB OR LONG-TERM CARE | End: 2024-05-17
Attending: STUDENT IN AN ORGANIZED HEALTH CARE EDUCATION/TRAINING PROGRAM | Admitting: STUDENT IN AN ORGANIZED HEALTH CARE EDUCATION/TRAINING PROGRAM

## 2024-05-10 ENCOUNTER — ANESTHESIA (OUTPATIENT)
Dept: SURGERY | Facility: HOSPITAL | Age: 64
End: 2024-05-10
Payer: COMMERCIAL

## 2024-05-10 ENCOUNTER — ANESTHESIA EVENT (OUTPATIENT)
Dept: SURGERY | Facility: HOSPITAL | Age: 64
End: 2024-05-10
Payer: COMMERCIAL

## 2024-05-10 ENCOUNTER — APPOINTMENT (OUTPATIENT)
Dept: GENERAL RADIOLOGY | Facility: HOSPITAL | Age: 64
End: 2024-05-10
Attending: STUDENT IN AN ORGANIZED HEALTH CARE EDUCATION/TRAINING PROGRAM

## 2024-05-10 DIAGNOSIS — Z01.818 PREOP TESTING: Primary | ICD-10-CM

## 2024-05-10 DIAGNOSIS — M16.12 PRIMARY OSTEOARTHRITIS OF LEFT HIP: ICD-10-CM

## 2024-05-10 LAB
GLUCOSE BLDC GLUCOMTR-MCNC: 108 MG/DL (ref 70–99)
GLUCOSE BLDC GLUCOMTR-MCNC: 114 MG/DL (ref 70–99)
GLUCOSE BLDC GLUCOMTR-MCNC: 219 MG/DL (ref 70–99)

## 2024-05-10 PROCEDURE — 72170 X-RAY EXAM OF PELVIS: CPT | Performed by: STUDENT IN AN ORGANIZED HEALTH CARE EDUCATION/TRAINING PROGRAM

## 2024-05-10 PROCEDURE — 88311 DECALCIFY TISSUE: CPT | Performed by: STUDENT IN AN ORGANIZED HEALTH CARE EDUCATION/TRAINING PROGRAM

## 2024-05-10 PROCEDURE — 82962 GLUCOSE BLOOD TEST: CPT

## 2024-05-10 PROCEDURE — 88305 TISSUE EXAM BY PATHOLOGIST: CPT | Performed by: STUDENT IN AN ORGANIZED HEALTH CARE EDUCATION/TRAINING PROGRAM

## 2024-05-10 PROCEDURE — 0SRB04A REPLACEMENT OF LEFT HIP JOINT WITH CERAMIC ON POLYETHYLENE SYNTHETIC SUBSTITUTE, UNCEMENTED, OPEN APPROACH: ICD-10-PCS | Performed by: STUDENT IN AN ORGANIZED HEALTH CARE EDUCATION/TRAINING PROGRAM

## 2024-05-10 PROCEDURE — 94660 CPAP INITIATION&MGMT: CPT

## 2024-05-10 PROCEDURE — 76000 FLUOROSCOPY <1 HR PHYS/QHP: CPT | Performed by: STUDENT IN AN ORGANIZED HEALTH CARE EDUCATION/TRAINING PROGRAM

## 2024-05-10 DEVICE — EMPHASYS POLYETHYLENE LINER AOX NEUTRAL 52MM 36MM
Type: IMPLANTABLE DEVICE | Site: HIP | Status: FUNCTIONAL
Brand: EMPHASYS

## 2024-05-10 DEVICE — PINNACLE CANCELLOUS BONE SCREW 6.5MM X 30MM
Type: IMPLANTABLE DEVICE | Site: HIP | Status: FUNCTIONAL
Brand: PINNACLE

## 2024-05-10 DEVICE — ACTIS DUOFIX HIP PROSTHESIS (FEMORAL STEM 12/14 TAPER CEMENTLESS SIZE 3 HIGH COLLAR)  CE
Type: IMPLANTABLE DEVICE | Site: HIP | Status: FUNCTIONAL
Brand: ACTIS

## 2024-05-10 DEVICE — EMPHASYS ACETABULAR SHELL THREE-HOLE 52MM CEMENTLESS
Type: IMPLANTABLE DEVICE | Site: HIP | Status: FUNCTIONAL
Brand: EMPHASYS

## 2024-05-10 DEVICE — BIOLOX DELTA CERAMIC FEMORAL HEAD +5.0 36MM DIA 12/14 TAPER
Type: IMPLANTABLE DEVICE | Site: HIP | Status: FUNCTIONAL
Brand: BIOLOX DELTA

## 2024-05-10 RX ORDER — LIDOCAINE HYDROCHLORIDE 10 MG/ML
INJECTION, SOLUTION EPIDURAL; INFILTRATION; INTRACAUDAL; PERINEURAL AS NEEDED
Status: DISCONTINUED | OUTPATIENT
Start: 2024-05-10 | End: 2024-05-10 | Stop reason: SURG

## 2024-05-10 RX ORDER — ACETAMINOPHEN 500 MG
1000 TABLET ORAL EVERY 6 HOURS
Status: DISCONTINUED | OUTPATIENT
Start: 2024-05-10 | End: 2024-05-17

## 2024-05-10 RX ORDER — ACETAMINOPHEN 500 MG
1000 TABLET ORAL ONCE
Status: COMPLETED | OUTPATIENT
Start: 2024-05-10 | End: 2024-05-10

## 2024-05-10 RX ORDER — HYDROMORPHONE HYDROCHLORIDE 1 MG/ML
0.6 INJECTION, SOLUTION INTRAMUSCULAR; INTRAVENOUS; SUBCUTANEOUS EVERY 5 MIN PRN
Status: DISCONTINUED | OUTPATIENT
Start: 2024-05-10 | End: 2024-05-10 | Stop reason: HOSPADM

## 2024-05-10 RX ORDER — DIPHENHYDRAMINE HYDROCHLORIDE 50 MG/ML
25 INJECTION INTRAMUSCULAR; INTRAVENOUS ONCE AS NEEDED
Status: ACTIVE | OUTPATIENT
Start: 2024-05-10 | End: 2024-05-10

## 2024-05-10 RX ORDER — MORPHINE SULFATE 10 MG/ML
6 INJECTION, SOLUTION INTRAMUSCULAR; INTRAVENOUS EVERY 10 MIN PRN
Status: DISCONTINUED | OUTPATIENT
Start: 2024-05-10 | End: 2024-05-10 | Stop reason: HOSPADM

## 2024-05-10 RX ORDER — DEXTROSE MONOHYDRATE 25 G/50ML
50 INJECTION, SOLUTION INTRAVENOUS
Status: DISCONTINUED | OUTPATIENT
Start: 2024-05-10 | End: 2024-05-10 | Stop reason: HOSPADM

## 2024-05-10 RX ORDER — KETOROLAC TROMETHAMINE 15 MG/ML
15 INJECTION, SOLUTION INTRAMUSCULAR; INTRAVENOUS EVERY 6 HOURS
Status: DISPENSED | OUTPATIENT
Start: 2024-05-10 | End: 2024-05-12

## 2024-05-10 RX ORDER — TRANEXAMIC ACID 10 MG/ML
INJECTION, SOLUTION INTRAVENOUS AS NEEDED
Status: DISCONTINUED | OUTPATIENT
Start: 2024-05-10 | End: 2024-05-10 | Stop reason: SURG

## 2024-05-10 RX ORDER — FAMOTIDINE 10 MG/ML
20 INJECTION, SOLUTION INTRAVENOUS 2 TIMES DAILY
Status: DISCONTINUED | OUTPATIENT
Start: 2024-05-10 | End: 2024-05-17

## 2024-05-10 RX ORDER — DIPHENHYDRAMINE HCL 25 MG
25 CAPSULE ORAL EVERY 4 HOURS PRN
Status: DISCONTINUED | OUTPATIENT
Start: 2024-05-10 | End: 2024-05-17

## 2024-05-10 RX ORDER — MORPHINE SULFATE 4 MG/ML
2 INJECTION, SOLUTION INTRAMUSCULAR; INTRAVENOUS EVERY 10 MIN PRN
Status: DISCONTINUED | OUTPATIENT
Start: 2024-05-10 | End: 2024-05-10 | Stop reason: HOSPADM

## 2024-05-10 RX ORDER — BISACODYL 10 MG
10 SUPPOSITORY, RECTAL RECTAL
Status: DISCONTINUED | OUTPATIENT
Start: 2024-05-10 | End: 2024-05-17

## 2024-05-10 RX ORDER — SODIUM CHLORIDE, SODIUM LACTATE, POTASSIUM CHLORIDE, CALCIUM CHLORIDE 600; 310; 30; 20 MG/100ML; MG/100ML; MG/100ML; MG/100ML
INJECTION, SOLUTION INTRAVENOUS CONTINUOUS
Status: DISCONTINUED | OUTPATIENT
Start: 2024-05-10 | End: 2024-05-10 | Stop reason: HOSPADM

## 2024-05-10 RX ORDER — BUPIVACAINE HYDROCHLORIDE 7.5 MG/ML
INJECTION, SOLUTION INTRASPINAL
Status: COMPLETED | OUTPATIENT
Start: 2024-05-10 | End: 2024-05-10

## 2024-05-10 RX ORDER — HYDROMORPHONE HYDROCHLORIDE 1 MG/ML
0.4 INJECTION, SOLUTION INTRAMUSCULAR; INTRAVENOUS; SUBCUTANEOUS EVERY 5 MIN PRN
Status: DISCONTINUED | OUTPATIENT
Start: 2024-05-10 | End: 2024-05-10 | Stop reason: HOSPADM

## 2024-05-10 RX ORDER — CETIRIZINE HYDROCHLORIDE 10 MG/1
10 TABLET ORAL DAILY
Status: DISCONTINUED | OUTPATIENT
Start: 2024-05-11 | End: 2024-05-17

## 2024-05-10 RX ORDER — ROSUVASTATIN CALCIUM 20 MG/1
20 TABLET, COATED ORAL NIGHTLY
Status: DISCONTINUED | OUTPATIENT
Start: 2024-05-10 | End: 2024-05-17

## 2024-05-10 RX ORDER — FAMOTIDINE 20 MG/1
20 TABLET, FILM COATED ORAL 2 TIMES DAILY
Status: DISCONTINUED | OUTPATIENT
Start: 2024-05-10 | End: 2024-05-17

## 2024-05-10 RX ORDER — ONDANSETRON 2 MG/ML
4 INJECTION INTRAMUSCULAR; INTRAVENOUS EVERY 6 HOURS PRN
Status: DISCONTINUED | OUTPATIENT
Start: 2024-05-10 | End: 2024-05-17

## 2024-05-10 RX ORDER — SODIUM CHLORIDE 9 MG/ML
INJECTION, SOLUTION INTRAVENOUS CONTINUOUS
Status: DISCONTINUED | OUTPATIENT
Start: 2024-05-10 | End: 2024-05-11

## 2024-05-10 RX ORDER — SODIUM CHLORIDE, SODIUM LACTATE, POTASSIUM CHLORIDE, CALCIUM CHLORIDE 600; 310; 30; 20 MG/100ML; MG/100ML; MG/100ML; MG/100ML
INJECTION, SOLUTION INTRAVENOUS CONTINUOUS
Status: DISCONTINUED | OUTPATIENT
Start: 2024-05-10 | End: 2024-05-11

## 2024-05-10 RX ORDER — DIPHENHYDRAMINE HYDROCHLORIDE 50 MG/ML
12.5 INJECTION INTRAMUSCULAR; INTRAVENOUS EVERY 4 HOURS PRN
Status: DISCONTINUED | OUTPATIENT
Start: 2024-05-10 | End: 2024-05-17

## 2024-05-10 RX ORDER — HYDROCODONE BITARTRATE AND ACETAMINOPHEN 5; 325 MG/1; MG/1
2 TABLET ORAL EVERY 4 HOURS PRN
Status: DISCONTINUED | OUTPATIENT
Start: 2024-05-10 | End: 2024-05-17

## 2024-05-10 RX ORDER — NICOTINE POLACRILEX 4 MG
30 LOZENGE BUCCAL
Status: DISCONTINUED | OUTPATIENT
Start: 2024-05-10 | End: 2024-05-10 | Stop reason: HOSPADM

## 2024-05-10 RX ORDER — SENNOSIDES 8.6 MG
17.2 TABLET ORAL NIGHTLY
Status: DISCONTINUED | OUTPATIENT
Start: 2024-05-10 | End: 2024-05-17

## 2024-05-10 RX ORDER — ACETAMINOPHEN 325 MG/1
650 TABLET ORAL EVERY 4 HOURS PRN
Status: DISCONTINUED | OUTPATIENT
Start: 2024-05-10 | End: 2024-05-17

## 2024-05-10 RX ORDER — ACETAMINOPHEN 500 MG
1000 TABLET ORAL ONCE
Status: DISCONTINUED | OUTPATIENT
Start: 2024-05-10 | End: 2024-05-10 | Stop reason: HOSPADM

## 2024-05-10 RX ORDER — PHENYLEPHRINE HCL 10 MG/ML
VIAL (ML) INJECTION AS NEEDED
Status: DISCONTINUED | OUTPATIENT
Start: 2024-05-10 | End: 2024-05-10 | Stop reason: SURG

## 2024-05-10 RX ORDER — CEFAZOLIN SODIUM/WATER 2 G/20 ML
2 SYRINGE (ML) INTRAVENOUS ONCE
Status: COMPLETED | OUTPATIENT
Start: 2024-05-10 | End: 2024-05-10

## 2024-05-10 RX ORDER — HYDROCODONE BITARTRATE AND ACETAMINOPHEN 5; 325 MG/1; MG/1
1 TABLET ORAL EVERY 4 HOURS PRN
Status: DISCONTINUED | OUTPATIENT
Start: 2024-05-10 | End: 2024-05-17

## 2024-05-10 RX ORDER — NICOTINE POLACRILEX 4 MG
15 LOZENGE BUCCAL
Status: DISCONTINUED | OUTPATIENT
Start: 2024-05-10 | End: 2024-05-10 | Stop reason: HOSPADM

## 2024-05-10 RX ORDER — LOSARTAN POTASSIUM 50 MG/1
50 TABLET ORAL DAILY
Status: DISCONTINUED | OUTPATIENT
Start: 2024-05-11 | End: 2024-05-17

## 2024-05-10 RX ORDER — MULTIPLE VITAMINS W/ MINERALS TAB 9MG-400MCG
1 TAB ORAL DAILY
Status: DISCONTINUED | OUTPATIENT
Start: 2024-05-10 | End: 2024-05-17

## 2024-05-10 RX ORDER — LEVOTHYROXINE SODIUM 88 UG/1
88 TABLET ORAL EVERY MORNING
Status: DISCONTINUED | OUTPATIENT
Start: 2024-05-11 | End: 2024-05-17

## 2024-05-10 RX ORDER — MIDAZOLAM HYDROCHLORIDE 1 MG/ML
INJECTION INTRAMUSCULAR; INTRAVENOUS AS NEEDED
Status: DISCONTINUED | OUTPATIENT
Start: 2024-05-10 | End: 2024-05-10 | Stop reason: SURG

## 2024-05-10 RX ORDER — DEXAMETHASONE SODIUM PHOSPHATE 4 MG/ML
VIAL (ML) INJECTION AS NEEDED
Status: DISCONTINUED | OUTPATIENT
Start: 2024-05-10 | End: 2024-05-10 | Stop reason: SURG

## 2024-05-10 RX ORDER — METOCLOPRAMIDE HYDROCHLORIDE 5 MG/ML
10 INJECTION INTRAMUSCULAR; INTRAVENOUS EVERY 8 HOURS PRN
Status: DISCONTINUED | OUTPATIENT
Start: 2024-05-10 | End: 2024-05-17

## 2024-05-10 RX ORDER — ASPIRIN 81 MG/1
81 TABLET ORAL 2 TIMES DAILY
Status: DISCONTINUED | OUTPATIENT
Start: 2024-05-10 | End: 2024-05-17

## 2024-05-10 RX ORDER — VENLAFAXINE HYDROCHLORIDE 150 MG/1
150 CAPSULE, EXTENDED RELEASE ORAL DAILY
Status: DISCONTINUED | OUTPATIENT
Start: 2024-05-11 | End: 2024-05-17

## 2024-05-10 RX ORDER — LATANOPROST 50 UG/ML
1 SOLUTION/ DROPS OPHTHALMIC NIGHTLY
Status: DISCONTINUED | OUTPATIENT
Start: 2024-05-10 | End: 2024-05-17

## 2024-05-10 RX ORDER — DOXYCYCLINE HYCLATE 100 MG/1
100 CAPSULE ORAL EVERY 12 HOURS SCHEDULED
Status: DISCONTINUED | OUTPATIENT
Start: 2024-05-11 | End: 2024-05-17

## 2024-05-10 RX ORDER — FAMOTIDINE 20 MG/1
20 TABLET, FILM COATED ORAL ONCE
Status: COMPLETED | OUTPATIENT
Start: 2024-05-10 | End: 2024-05-10

## 2024-05-10 RX ORDER — DOCUSATE SODIUM 100 MG/1
100 CAPSULE, LIQUID FILLED ORAL 2 TIMES DAILY
Status: DISCONTINUED | OUTPATIENT
Start: 2024-05-10 | End: 2024-05-17

## 2024-05-10 RX ORDER — ONDANSETRON 2 MG/ML
INJECTION INTRAMUSCULAR; INTRAVENOUS AS NEEDED
Status: DISCONTINUED | OUTPATIENT
Start: 2024-05-10 | End: 2024-05-10 | Stop reason: SURG

## 2024-05-10 RX ORDER — MORPHINE SULFATE 4 MG/ML
4 INJECTION, SOLUTION INTRAMUSCULAR; INTRAVENOUS EVERY 10 MIN PRN
Status: DISCONTINUED | OUTPATIENT
Start: 2024-05-10 | End: 2024-05-10 | Stop reason: HOSPADM

## 2024-05-10 RX ORDER — CEFAZOLIN SODIUM/WATER 2 G/20 ML
2 SYRINGE (ML) INTRAVENOUS EVERY 8 HOURS
Qty: 40 ML | Refills: 0 | Status: COMPLETED | OUTPATIENT
Start: 2024-05-10 | End: 2024-05-11

## 2024-05-10 RX ORDER — POLYETHYLENE GLYCOL 3350 17 G/17G
17 POWDER, FOR SOLUTION ORAL DAILY PRN
Status: DISCONTINUED | OUTPATIENT
Start: 2024-05-10 | End: 2024-05-17

## 2024-05-10 RX ORDER — LIDOCAINE HYDROCHLORIDE 10 MG/ML
INJECTION, SOLUTION INFILTRATION; PERINEURAL
Status: COMPLETED | OUTPATIENT
Start: 2024-05-10 | End: 2024-05-10

## 2024-05-10 RX ORDER — FAMOTIDINE 10 MG/ML
20 INJECTION, SOLUTION INTRAVENOUS ONCE
Status: COMPLETED | OUTPATIENT
Start: 2024-05-10 | End: 2024-05-10

## 2024-05-10 RX ORDER — TRAMADOL HYDROCHLORIDE 50 MG/1
50 TABLET ORAL EVERY 6 HOURS SCHEDULED
Status: DISCONTINUED | OUTPATIENT
Start: 2024-05-10 | End: 2024-05-17

## 2024-05-10 RX ORDER — ENEMA 19; 7 G/133ML; G/133ML
1 ENEMA RECTAL ONCE AS NEEDED
Status: DISCONTINUED | OUTPATIENT
Start: 2024-05-10 | End: 2024-05-17

## 2024-05-10 RX ORDER — CELECOXIB 200 MG/1
400 CAPSULE ORAL ONCE
Status: COMPLETED | OUTPATIENT
Start: 2024-05-10 | End: 2024-05-10

## 2024-05-10 RX ORDER — TIMOLOL MALEATE 5 MG/ML
1 SOLUTION/ DROPS OPHTHALMIC DAILY
Status: DISCONTINUED | OUTPATIENT
Start: 2024-05-11 | End: 2024-05-17

## 2024-05-10 RX ORDER — NALOXONE HYDROCHLORIDE 0.4 MG/ML
0.08 INJECTION, SOLUTION INTRAMUSCULAR; INTRAVENOUS; SUBCUTANEOUS AS NEEDED
Status: DISCONTINUED | OUTPATIENT
Start: 2024-05-10 | End: 2024-05-10 | Stop reason: HOSPADM

## 2024-05-10 RX ORDER — HYDROMORPHONE HYDROCHLORIDE 1 MG/ML
0.2 INJECTION, SOLUTION INTRAMUSCULAR; INTRAVENOUS; SUBCUTANEOUS EVERY 5 MIN PRN
Status: DISCONTINUED | OUTPATIENT
Start: 2024-05-10 | End: 2024-05-10 | Stop reason: HOSPADM

## 2024-05-10 RX ORDER — ACETAMINOPHEN 500 MG
500 TABLET ORAL EVERY 4 HOURS PRN
Status: DISCONTINUED | OUTPATIENT
Start: 2024-05-10 | End: 2024-05-17

## 2024-05-10 RX ADMIN — LIDOCAINE HYDROCHLORIDE 5 ML: 10 INJECTION, SOLUTION INFILTRATION; PERINEURAL at 13:12:00

## 2024-05-10 RX ADMIN — DEXAMETHASONE SODIUM PHOSPHATE 4 MG: 4 MG/ML VIAL (ML) INJECTION at 13:20:00

## 2024-05-10 RX ADMIN — LIDOCAINE HYDROCHLORIDE 50 MG: 10 INJECTION, SOLUTION EPIDURAL; INFILTRATION; INTRACAUDAL; PERINEURAL at 13:06:00

## 2024-05-10 RX ADMIN — TRANEXAMIC ACID 1000 MG: 10 INJECTION, SOLUTION INTRAVENOUS at 14:08:00

## 2024-05-10 RX ADMIN — SODIUM CHLORIDE, SODIUM LACTATE, POTASSIUM CHLORIDE, CALCIUM CHLORIDE: 600; 310; 30; 20 INJECTION, SOLUTION INTRAVENOUS at 14:37:00

## 2024-05-10 RX ADMIN — CEFAZOLIN SODIUM/WATER 2 G: 2 G/20 ML SYRINGE (ML) INTRAVENOUS at 13:17:00

## 2024-05-10 RX ADMIN — PHENYLEPHRINE HCL 100 MCG: 10 MG/ML VIAL (ML) INJECTION at 13:20:00

## 2024-05-10 RX ADMIN — BUPIVACAINE HYDROCHLORIDE 1.5 ML: 7.5 INJECTION, SOLUTION INTRASPINAL at 13:12:00

## 2024-05-10 RX ADMIN — TRANEXAMIC ACID 1000 MG: 10 INJECTION, SOLUTION INTRAVENOUS at 13:25:00

## 2024-05-10 RX ADMIN — MIDAZOLAM HYDROCHLORIDE 2 MG: 1 INJECTION INTRAMUSCULAR; INTRAVENOUS at 13:06:00

## 2024-05-10 RX ADMIN — SODIUM CHLORIDE, SODIUM LACTATE, POTASSIUM CHLORIDE, CALCIUM CHLORIDE: 600; 310; 30; 20 INJECTION, SOLUTION INTRAVENOUS at 13:06:00

## 2024-05-10 RX ADMIN — ONDANSETRON 4 MG: 2 INJECTION INTRAMUSCULAR; INTRAVENOUS at 13:20:00

## 2024-05-10 NOTE — OPERATIVE REPORT
[facility]  OPERATIVE REPORT     DATE OF SURGERY: 5/10/2024     PREOPERATIVE DIAGNOSIS:   Left Hip Osteoarthritis     POST-OPERATIVE DIAGNOSIS:   Left Hip Osteoarthritis     PROCEDURE:  Left Total Hip Arthroplasty  Intra-operative Interpretation of Fluoroscopy     Location: [facility]     SURGEON: Omar A Behery, MD  Assistant(s):Marquez Witt MD, Ru GARZON     Anesthesia type:  Spinal  Estimated Blood Loss: 250cc     Drains: None     Findings: Consistent with advanced degenerative joint disease     Specimen: Femoral head     Complications: None immediately apparent     Implants:  Acetabular Component: Depuy Emphasys, size 52mm 3 hole shell, 36mm neutral neutral AOX Polyethylene Liner, two x 6.5mm dome screws  Femoral Component: Press-fit size 3, high offset, Depuy Actis femoral component  Head: 36mm + 1.5 delta ceramic head.      Indication:      This is a 63 year old lady, who presented with chronic hip pain refractory to non-operative treatment, and impairing activities of daily living. Radiographic evaluation demonstrated hip osteoarthritis . Surgical versus non-surgical options were discussed with the patient, and together we decided it is best to proceed with a total hip arthroplasty with the goals of pain relief and improvement in function. All risks and benefits of surgery including bleeding, infection, dislocation, amy-prosthetic fracture, neurovascular injury, leg length or offset discrepancy, as well as medical and anesthesia-related complications were discussed with the patient / family, who elected to proceed with surgery.      Procedure in detail:     The patient was brought to the operating room, and underwent the above anesthesia.      The patient was placed in a supine position with both feet secured in boots on the [place] table.  The operative hip was sterilely prepped and draped in a usual orthopedic fashion.       A surgical pause was conducted to reconfirm the  correct patient, site, side, and procedure to be performed.  Perioperative antibiotics were given within one hour prior to the procedure.       An approximately 8cm long incision was made beginning proximally 2 cm lateral and 2 cm distal to the anterior-superior iliac spine and extending distally toward the ipsilateral lateral epicondyle of the knee.  Electrocautery was used to dissect through the subcutaneous tissue. The fascia overlying the TFL was incised in line with its fibers.  A Lowery elevator was used to separate the fascia from the TFL.  A finger then was placed along the superior femoral neck, and a cobra retractor was placed.  A cerebellar retractor was placed distally between the rectus and the tensor muscle.      Using electrocautery and a Schnidt tonsil clamp, the ascending branches of the lateral circumflex artery were ligated.  The investing fascia over the capsule was divided, and another cobra retractor was placed inferior to the femoral neck.  The pericapsular fat was then excised from around the anterior hip capsule.      The anterior hip capsule was then incised with electrocautery starting at the edge of the acetabulum in line with the anterosuperior edge of the femoral neck and extending distally to the anterior intertrochanteric line, dividing the capsule at about a 135-degree angle. The medial and lateral capsule flaps were elevated off the of the proximal femur intertrochanteric line, and No. 5 Ethibond tagging sutures were placed in the both flaps of the capsule. A Hohmann retractor was placed posterosuperiorly over the acetabulum and the capsule was elevated a few millimeters off of the acetabular rim and ilium. The hip was externally rotated to 90 degrees, allowing an inferomedial split of the capsule. The hip was rotated back and a double pronged retractor was placed inferomedially instead of the cobra.      The superior cobra was placed inside the capsule. A kimberlee was made on the femoral  neck in line with the planned femoral neck resection.  A reciprocating saw was used to complete the femoral neck osteotomy.  The hip was externally rotated to 70 degrees, and traction was applied. A spiral femoral hip screw was placed in the femoral head, and the femoral head was removed from the wound.       A No. 1 retractor over the anterior wall of the acetabulum and the No. 2 retractor was placed postero-superiorly.  This exposed the acetabulum. We then used electrocautery to excise the labrum and pulvinar. Fluoroscopy was utilized to obtain a perfect AP pelvis radiograph.  We then started with a 51-mm reamer, and sequentially expanded the socket. The last reamer was a 52-mm reamer.  Once appropriate reaming and positioning of the cup was determined with fluoroscopy, we then placed a final 52 mm acetabular shell in the appropriate degree of anteversion and abduction.  There was an excellent press fit.  Supplemental 6.5mm cancellous bone screws were drilled, measured and placed.  The final  36 mm neutral polyethylene acetabular liner was impacted into place without difficulty. The liner was checked and noted to be well seated.      Traction was released, and then we turned our attention to the femur. The femoral hook from the Waddell bed was placed around the vastus ridge.  The leg was externally rotated 120 degrees. An asymmetric double pronged retractor was placed distally medially over the calcar, and the leg was then extended and adducted. A #1 retractor was placed posteriorly between the superolateral capsule and the gluteus minimus. The lateral capsule was then released off the femur, allowing for improved elevation of the femur. The conjoined was released but the obturator externus and piriformis tendons were not released. The hook was elevated, a double pronged retractor was placed over the greater trochanter and appropriate exposure of the femur was achieved.      A box osteotome was used to identify the  starting location. A rongeur was used to remove posterolateral cortical bone to allow neutral broaching.  The femoral canal was then broached sequentially to a size 3 stem.  The broach was left in place, and a high offset neck with a 36-mm +5 trial head were placed.  The leg was abducted and raised, and the hip was then reduced. Fluoroscopic imaging demonstrated appropriate leg length and offset.  The hip was found to have appropriate soft tissue tension.  Hip range of motion was tested and noted to be stable throughout.     We then elected to implant these components.  The hip was re-dislocated.  The trial components were removed. The femur was exposed again, and the final size 3, high offset stem was impacted into place. The calcar was checked and noted to be intact without any evidence of fracture. A 36 mm + 5 femoral head was retrialed with appropriate soft tissue tension.  This was the minimum length necessary for adequate stability. The final 36 mm + 1.5  femoral head was then impacted onto a cleaned/dried trunnion.  The hip was then reduced.      The wound was thoroughly irrigated with dilute betadine solution followed by normal saline. Pain cocktail injection was delivered to the soft tissues. The capsule was closed with No. 1 Vicryl interrupted sutures. The fascia over the TFL was closed with a running Quill suture.  The subcutaneous tissues were then closed with 2-0 vicryl.  The skin was closed with a running 3-0 monocryl suture.  Dermabond was applied to the skin edges and a dry sterile dressing was placed.  The patient was awakened from anesthesia and taken to the PACU in stable condition.  There were no immediate complications.         POST-OPERATIVE PLAN  The patient will be permitted weight bearing as tolerated on the operative extremity  The patient will be permitted range of motion as tolerated  The patient will receive 24 hours of perioperative antibiotics.  Venous thromboembolic prophylaxis will  consist of early mobilization, mechanical compressive devices, and ASA 81 BID  The dressing may be removed at 12 days post-operatively  The patient should be scheduled for follow up in 2 weeks for wound and radiographic evaluation.

## 2024-05-10 NOTE — H&P
History and Physical     Sharifa Flores Patient Status:  Outpatient in a Bed    8/10/1960 MRN [id number]   Location [facility] OPERATING ROOM Attending Behery, Omar Atef, MD   Hosp Day # 0 PCP Ellie North DO     Chief Complaint: left hip pain    Subjective:    Sharifa Flores is a 63 year old female who presents for follow-up of left hip pain secondary to advanced osteoarthritis. Pain is located in the groin and significantly impacts her quality of life and activities of daily living. She tried a fluoro guided CSI in 2023, but had no relief unfortunately.     Also status post right primary PRAFUL on 2023. No issues presently, and has recovered well.     Recovered from metacarpal fractures treated and following with Dr. Mancilla.    History/Other:      Past Medical History:  Past Medical History:    Back problem    Breast cancer (HCC)    left side    Depression    Diabetes (HCC)    Disorder of thyroid    Essential hypertension    Exposure to medical diagnostic radiation    Glaucoma    High blood pressure    High cholesterol    Hyperthyroidism    IBS (irritable bowel syndrome)    diarrhea    Incontinence    bladder    Mixed hyperlipidemia    Osteoarthritis    Palpitation    Personal history of antineoplastic chemotherapy    PONV (postoperative nausea and vomiting)    Precordial pain    Sleep apnea    CPAP    Visual impairment    readers        Past Surgical History:   Past Surgical History:   Procedure Laterality Date    Breast biopsy Left     benign    Cholecystectomy      Colonoscopy      Mastectomy left      Mastectomy modified radical Bilateral     Mastectomy right      Other surgical history      port was removed.    Total hip replacement Right 2023    Right Total Hip Arthroplasty       Social History:  reports that she has never smoked. She has never used smokeless tobacco. She reports that she does not currently use alcohol. She reports that she does not use drugs.    Family History:    Family History   Problem Relation Age of Onset    Hypertension Father     Heart Disorder Father     Cancer Mother         breast    Other (Other[other]) Mother         glaucoma    Hypertension Maternal Grandmother     Other (Other[other]) Maternal Grandfather         glaucoma    Diabetes Brother        Allergies:   Allergies   Allergen Reactions    Codeine PAIN     headache    Oxycontin [Oxycodone] NAUSEA AND VOMITING     Severe n/v per pt    Penicillins UNKNOWN     Brother with severe reactions, pt unable to remember if she had any    Soy Allergy DIARRHEA    Tegaderm Ag Mesh 2\"X2\" [Wound Dressings] RASH     Long term exposure       Medications:    No current facility-administered medications on file prior to encounter.     Current Outpatient Medications on File Prior to Encounter   Medication Sig Dispense Refill    mupirocin 2 % External Ointment Apply topically 2 (two) times daily. To bilateral nares as ordered by surgeon      Vitamin C 500 MG Oral Tab Take 1 tablet (500 mg total) by mouth daily.      loratadine 10 MG Oral Tab Take 1 tablet (10 mg total) by mouth as needed for Allergies.      acetaminophen 500 MG Oral Tab Take 2 tablets (1,000 mg total) by mouth every 6 (six) hours as needed for Pain.      CALCIUM MAGNESIUM ZINC OR Take 1 tablet by mouth daily.      venlafaxine  MG Oral Capsule SR 24 Hr Take 1 capsule (150 mg total) by mouth daily. 90 capsule 0    LEVOTHYROXINE 88 MCG Oral Tab TAKE ONE TABLET BY MOUTH EVERY MORNING 90 tablet 0    timolol 0.5 % Ophthalmic Solution Place 1 drop into both eyes daily. 1 drop left eye in the am      rosuvastatin 20 MG Oral Tab Take 1 tablet (20 mg total) by mouth nightly. 1 tablet nightly 90 tablet 1    Cholecalciferol (VITAMIN D-3 OR) Take by mouth. 1000 units daily      Multiple Vitamins-Minerals (MULTI-VITAMIN/MINERALS) Oral Tab Take 1 tablet by mouth daily.      B Complex Vitamins (VITAMIN B COMPLEX OR) Take 1 tablet by mouth daily.      metFORMIN HCl ER  500 MG Oral Tablet 24 Hr Take 1 tablet (500 mg total) by mouth daily with breakfast. 180 tablet 1    latanoprost 0.005 % Ophthalmic Solution Place 1 drop into both eyes nightly.      anastrozole 1 MG Oral Tab tab Take 1 tablet (1 mg total) by mouth daily.      losartan Potassium 50 MG Oral Tab Take 1 tablet (50 mg total) by mouth.      Glucosamine-Chondroitin (GLUCOSAMINE CHONDR COMPLEX OR) Take 1 tablet by mouth daily.         Review of Systems:   Pertinent positives and negatives noted in the HPI.    Objective:     Ht 5' 1\" (1.549 m)   Wt 194 lb (88 kg)   BMI 36.66 kg/m²   LEFT LOWER EXTREMITY:   There are no prior incisions, scars, or lesions over the left hip   There is minimal tenderness to palpation over the greater trochanter.   Passive range of motion testing revealed 10-85? extension, ? flexion, 0? internal rotation, 15? external rotation   The patient has pain with Stinchfield’s test, and FADIR   + Lorne test   Ipsilateral straight leg raise test is negative for radiculopathy.   Ipsilateral knee passive ROM is painless, no significant joint line tenderness to palpation.   Neuro: The patient has 3/5 strength in the hip flexors, 5/5 quadriceps, hamstrings, tibialis anterior, gastrocnemius-soleus complex, EHL, and FHL.   Abductor Strength is 5/5   Sensation is intact to light touch in the superficial peroneal, deep peroneal, saphenous, sural, and tibial nerve distributions.   Vascular exam: Toes are warm and well perfused. DP pulse is palpable   Moderate distal lymphedema    Results:    Labs:    Selected labs - last 24 hours:  Endo  Lytes  Renal   Glu -  Na - Ca -  BUN -   POC Gluc  -  K - PO4 -  Cr -   A1c -  Cl - Mg -  eGFR -   TSH -  CO2 -         LFT  CBC  Other   AST -  WBC -  PTT - Procal -   ALT -  Hb -  INR - CRP -   APk -  Hct -  Trop - D dim -   T rose mary -  PLT -  pBNP -  BNP -  - Ferritin  -   Prot -    CK  - Lactate  -   Alb -    LDL  - COVID  -       Imaging: Imaging data reviewed in  Epic.    Assessment & Plan:    Assessment:   This is a 63 Y o F, presenting for evaluation of left hip pain consistent with severe left hip osteoarthritis. This is significantly limiting the patient's function and is refractory to non-operative treatment including injection. She wishes to consider total hip arthroplasty was indicated for staged PRAFUL previously.  Underwent right PRAFUL and has recovered well.              Plan:   We discussed the surgical procedure, approach, implant selection, risks and benefits, and amy-operative course including discharge planning. We also discussed what is involved with surgery including potential risks and complications. The patient understood that the procedure may not fully alleviate the pain or return range of motion. The patient understood that there are risks associated with the surgery including but not limited to, problems with anesthesia (death), as well as surgical complications of thigh numbness, infection, instability (dislocation of the hip), fractures of the bones (femur or acetabulum), loosening or failure of implants, hematoma (blood accumulation in/near the hip) which may require surgical drainage, blood clots, pulmonary embolism, and blood vessel or nerve injury.      We will consider surgery pending review of clearance.         The patient again states that they are markedly debilitated by this pain. The patient feels that they have failed non operative measures and wishes to proceed with surgery. The patient verbalized understanding of the information presented today and endorsed a willingness to comply with instructions. All the patient's questions were answered to the patient's satisfaction.  Ru Anderson PA-C  5/9/2024    Supplementary Documentation:

## 2024-05-10 NOTE — ANESTHESIA POSTPROCEDURE EVALUATION
Patient: Sharifa Flores    Procedure Summary       Date: 05/10/24 Room / Location: [facility] MAIN OR  / [facility] MAIN OR    Anesthesia Start: 1306 Anesthesia Stop: 1448    Procedure: Left Total Hip Arthroplasty Anterior Approach (Left: Hip) Diagnosis:       Primary osteoarthritis of left hip      (Left Hip Osteoarthritis)    Surgeons: Behery, Omar Atef, MD Anesthesiologist: Don Davison MD    Anesthesia Type: spinal ASA Status: 3            Anesthesia Type: spinal    Vitals Value Taken Time   /63 05/10/24 1446   Temp 97 °F (36.1 °C) 05/10/24 1446   Pulse 73 05/10/24 1447   Resp 12 05/10/24 1447   SpO2 95 % 05/10/24 1446   Vitals shown include unfiled device data.    [facility] AN Post Evaluation:   Patient Evaluated in PACU  Patient Participation: complete - patient participated  Level of Consciousness: awake and alert  Pain Management: adequate  Airway Patency:patent  Yes    Nausea/Vomiting: none  Cardiovascular Status: acceptable  Respiratory Status: acceptable and room air  Postoperative Hydration acceptable      Don Davison MD  5/10/2024 2:48 PM

## 2024-05-10 NOTE — PLAN OF CARE
Pt alert and oriented x4. On room air. VSS. Ambulating x1 with a walker. Aquacel to left hip. Per patient she has 14 stairs in home and wants rehab. Plan TBD    Problem: Patient Centered Care  Goal: Patient preferences are identified and integrated in the patient's plan of care  Description: Interventions:  - Provide timely, complete, and accurate information to patient/family  - Incorporate patient and family knowledge, values, beliefs, and cultural backgrounds into the planning and delivery of care  - Encourage patient/family to participate in care and decision-making at the level they choose  - Honor patient and family perspectives and choices  Outcome: Progressing     Problem: Diabetes/Glucose Control  Goal: Glucose maintained within prescribed range  Description: INTERVENTIONS:  - Monitor Blood Glucose as ordered  - Assess for signs and symptoms of hyperglycemia and hypoglycemia  - Administer ordered medications to maintain glucose within target range  - Assess barriers to adequate nutritional intake and initiate nutrition consult as needed  - Instruct patient on self management of diabetes  Outcome: Progressing     Problem: PAIN - ADULT  Goal: Verbalizes/displays adequate comfort level or patient's stated pain goal  Description: INTERVENTIONS:  - Encourage pt to monitor pain and request assistance  - Assess pain using appropriate pain scale  - Administer analgesics based on type and severity of pain and evaluate response  - Implement non-pharmacological measures as appropriate and evaluate response  - Consider cultural and social influences on pain and pain management  - Manage/alleviate anxiety  - Utilize distraction and/or relaxation techniques  - Monitor for opioid side effects  - Notify MD/LIP if interventions unsuccessful or patient reports new pain  - Anticipate increased pain with activity and pre-medicate as appropriate  Outcome: Progressing     Problem: RISK FOR INFECTION - ADULT  Goal: Absence of  fever/infection during anticipated neutropenic period  Description: INTERVENTIONS  - Monitor WBC  - Administer growth factors as ordered  - Implement neutropenic guidelines  Outcome: Progressing     Problem: SAFETY ADULT - FALL  Goal: Free from fall injury  Description: INTERVENTIONS:  - Assess pt frequently for physical needs  - Identify cognitive and physical deficits and behaviors that affect risk of falls.  - Dixon fall precautions as indicated by assessment.  - Educate pt/family on patient safety including physical limitations  - Instruct pt to call for assistance with activity based on assessment  - Modify environment to reduce risk of injury  - Provide assistive devices as appropriate  - Consider OT/PT consult to assist with strengthening/mobility  - Encourage toileting schedule  Outcome: Progressing     Problem: DISCHARGE PLANNING  Goal: Discharge to home or other facility with appropriate resources  Description: INTERVENTIONS:  - Identify barriers to discharge w/pt and caregiver  - Include patient/family/discharge partner in discharge planning  - Arrange for needed discharge resources and transportation as appropriate  - Identify discharge learning needs (meds, wound care, etc)  - Arrange for interpreters to assist at discharge as needed  - Consider post-discharge preferences of patient/family/discharge partner  - Complete POLST form as appropriate  - Assess patient's ability to be responsible for managing their own health  - Refer to Case Management Department for coordinating discharge planning if the patient needs post-hospital services based on physician/LIP order or complex needs related to functional status, cognitive ability or social support system  Outcome: Progressing

## 2024-05-10 NOTE — DISCHARGE INSTRUCTIONS
DISCHARGE INSTRUCTIONS:  PLACE ICE TO SURGICAL AREA 20-30 MINUTES ON/ 20-30 MINUTES OFF. THIS IS TO HELP WITH PAIN & SWELLING.    TAKE YOUR MEDICATIONS AS PRESCRIBED BY YOUR DOCTOR BELOW.THESE HAVE BEEN SENT TO YOUR PHARMACY.    IF YOU WERE INSTRUCTED BY PHYSICAL THERAPY TO EXERCISE HIP PRECAUTIONS, CONTINUE TO DO SO AFTER DISCHARGE    IT IS OK TO BEAR WEIGHT AS TOLERATED ON THE OPERATIVE EXTREMITY.     CALL TO CONFIRM YOUR FOLLOW UP APPOINTMENT WITH DR. BEHERY TO BE SEEN IN 2-3 WEEKS POSTOP. [phone]    MEDICATIONS    POST OP MEDICATION REGIMEN              MULTI-MODAL   PAIN REGIMEN     DRUG   FOR   FREQUENCY & DURATION   QTY   NOTES     WEANING  TIPS       Gabapentin 100mg   Nerve pain   Take 2 tabs every 8 hours for 14 days    90   No refills You may discontinue this medication prior to 14 days if you do not tolerate it.        Tylenol 500mg     Mild pain   Take 2 tabs every 6 hours     90   Can purchase over-the-counter    Stop using medication if no longer having pain.      Tramadol 50mg     Moderate pain   Take 1-2 tabs every 6 hours only as needed   45 May prescribe a refill, but ideally, this should be tapered off after surgery Stop using this medication 2nd   As pain decreases over time, increase the interval between doses (6 hours to 8, then 12, then 24) to taper off the medication.      Meloxicam 15mg     Inflammation   Take 1 tab daily for 30 days     30   May refill if needed beyond 30 days   Recommend completing the 30 day supply.           BREAKTHROUGH PAIN         Oxycodone 5mg       Severe pain     Take 1-2 tabs every 4-6 hours only as needed for severe pain       40   Take at least 1 hr apart from Tramadol.    Ideally, no refill. The goal is to taper off this medication as soon as possible, as it can be addictive and have side effects.    Stop using this medication 1st if no longer having severe pain.         BLOOD THINNER     Aspirin 81mg   Blood clot prevention   Take 1 tab twice daily for  30 days     60     No refills   Complete the entire course of your blood thinner.         ANTIBIOTIC       Cefadroxil 500mg       Infection prevention     Take 1 tab twice daily for 7 days     14     No refills   Complete the entire course of your prophylactic antibiotic.           STOOL SOFTENER     Sennokot 8.6/50mg   Constipation   Take 1 tab twice daily  while on opioids     60 Refer to discharge instructions if constipation persists even after taking this medication   Stop taking if having diarrhea or loose stools.           ANTI-NAUSEA   Ondansetron 4mg   Nausea   Take 1 tab every 8 hours as needed if nauseous     20   No Refill      ANTI-ACID REFLUX / GASTRITIS   Pantoprazole 40mg   Acid reflux, gastric ulcer prophylaxis   Take 1 tab every day along with Meloxicam     60   May refill if Meloxicam refilled        DRESSING:    YOU HAVE A SPECIAL DRESSING CALLED AN AQUACEL DRESSING OVER YOUR INCISION.    THE DRESSING STAYS FOR 12 DAYS FROM SURGERY.    YOU MAY SHOWER AS SOON AS YOU RETURN HOME WITH THE AQUACEL DRESSING INTACT OVER YOUR INCISION AREA.    IF THE DRESSING LEAKS OR COMES LOOSE BEFORE THE 7 DAY PERIOD CONTACT YOUR DOCTOR / SURGEON.    AFTER   12 DAYS THE DRESSING IS REMOVED BY PULLING ON THE EDGE OF THE DRESSING AND GENTLY STRETCHING IT, THEN PEEL IT OFF SLOWLY LIKE A BANDAID. IF YOU HAVE ANY QUESTIONS OR CONCERNS ABOUT THE DRESSING CONTACT YOUR DOCTOR.    IF DRAINAGE IS PRESENT AT ANYTIME FROM YOUR DRESSING OR FROM YOUR INCISION CALL YOUR DOCTOR / SURGEON AS SOON AS POSSIBLE.      REASONS TO CALL YOUR DOCTOR:  TEMPERATURE .0 OR MORE  PERSISTANT NAUSEA OR VOMITTING - ESPECIALLY IF YOU ARE UNABLE TO EAT OR DRINK.  INCREASED LEVEL OF PAIN OR PAIN WHICH IS NOT CONTROLLED BY YOUR PAIN MEDICINE.  PERSISTENT DRAINAGE AT ANYTIME FROM YOUR SURGICAL AREA / INCISION.  PAIN, TENDERNESS OR SUDDEN SWELLING IN YOUR CALF REGION OF THE LOWER EXTREMITIES - THIS IS A POSSIBLE SIGN OF A BLOOD CLOT.  ANY CHANGES IN  SENSATION IN YOUR BODY IN THE AREA OF YOUR SURGERY = NUMBNESS OR TINGLING.  ANY CHANGES IN CIRCULATION IN YOUR BODY IN THE AREA OF YOUR SURGERY = CHANGES  IN COLOR EITHER DARKER OR VERY PALE; OR  IF AREA FEELS COLD TO THE TOUCH AS COMPARED TO OTHER AREAS.  ANY CHANGES IN FUNCTION IN YOUR BODY IN THE AREA OF YOUR SURGERY = CHANGE IN MOVEMENT - UNABLE TO MOVE OR WIGGLE FINGERS, TOES OR FOOT OR ANY OTHER CHANGES IN NORMAL BODY FUNCTIONING.  FOR ANY OF THE ABOVE OR ANY OTHER QUESTIONS OR CONCERNS CALL YOUR DOCTOR/ SURGEON AS SOON AS POSSIBLE.      Omar Behery, MD MPH  Orthopaedic Surgeon, Adult Hip and Knee Reconstruction  [facility] at [address], [address] [address]  Office: (P) [phone] (F) [phone]  Adminstrative Assistant: Lindsay Nobles

## 2024-05-10 NOTE — H&P
[facility] Hospitalist H&P       CC: Postoperative medical management       PCP: Ellie North DO    History of Present Illness: Patient is a 63 year old female with PMH breast cancer, HLD, HTN, OA, MDD, glaucoma, hypothyroidism who presents for elective left total hip arthroplasty, patient recently had right total hip arthroplasty without complications admitted to the hospital for observation medical service was consulted for postoperative medical management.  Patient seen after procedure she is having some mild chills but otherwise feels okay denies shortness of breath nausea fevers vomiting or chest tightness.     PMH  Past Medical History:    Back problem    Breast cancer (HCC)    left side    Depression    Diabetes (HCC)    Disorder of thyroid    Essential hypertension    Exposure to medical diagnostic radiation    Glaucoma    High blood pressure    High cholesterol    Hyperthyroidism    IBS (irritable bowel syndrome)    diarrhea    Incontinence    bladder    Mixed hyperlipidemia    Osteoarthritis    Palpitation    Personal history of antineoplastic chemotherapy    PONV (postoperative nausea and vomiting)    Precordial pain    Sleep apnea    CPAP    Visual impairment    readers        PSH  Past Surgical History:   Procedure Laterality Date    Breast biopsy Left     benign    Cholecystectomy      Colonoscopy      Mastectomy left      Mastectomy modified radical Bilateral     Mastectomy right      Other surgical history      port was removed.    Total hip replacement Right 08/04/2023    Right Total Hip Arthroplasty        ALL:  Allergies   Allergen Reactions    Codeine PAIN     headache    Oxycontin [Oxycodone] NAUSEA AND VOMITING     Severe n/v per pt    Penicillins UNKNOWN     Brother with severe reactions, pt unable to remember if she had any    Soy Allergy DIARRHEA    Tegaderm Ag Mesh 2\"X2\" [Wound Dressings] RASH     Long term exposure        Home Medications:  Outpatient Medications Marked as  Taking for the 5/10/24 encounter (Hospital Encounter)   Medication Sig Dispense Refill    mupirocin 2 % External Ointment Apply topically 2 (two) times daily. To bilateral nares as ordered by surgeon      Vitamin C 500 MG Oral Tab Take 1 tablet (500 mg total) by mouth daily.      acetaminophen 500 MG Oral Tab Take 2 tablets (1,000 mg total) by mouth every 6 (six) hours as needed for Pain.      CALCIUM MAGNESIUM ZINC OR Take 1 tablet by mouth daily.      venlafaxine  MG Oral Capsule SR 24 Hr Take 1 capsule (150 mg total) by mouth daily. 90 capsule 0    LEVOTHYROXINE 88 MCG Oral Tab TAKE ONE TABLET BY MOUTH EVERY MORNING 90 tablet 0    timolol 0.5 % Ophthalmic Solution Place 1 drop into both eyes daily. 1 drop left eye in the am      rosuvastatin 20 MG Oral Tab Take 1 tablet (20 mg total) by mouth nightly. 1 tablet nightly 90 tablet 1    Cholecalciferol (VITAMIN D-3 OR) Take by mouth. 1000 units daily      Multiple Vitamins-Minerals (MULTI-VITAMIN/MINERALS) Oral Tab Take 1 tablet by mouth daily.      B Complex Vitamins (VITAMIN B COMPLEX OR) Take 1 tablet by mouth daily.      metFORMIN HCl  MG Oral Tablet 24 Hr Take 1 tablet (500 mg total) by mouth daily with breakfast. 180 tablet 1    latanoprost 0.005 % Ophthalmic Solution Place 1 drop into both eyes nightly.      anastrozole 1 MG Oral Tab tab Take 1 tablet (1 mg total) by mouth daily.      losartan Potassium 50 MG Oral Tab Take 1 tablet (50 mg total) by mouth.      Glucosamine-Chondroitin (GLUCOSAMINE CHONDR COMPLEX OR) Take 1 tablet by mouth daily.           Soc Hx  Social History     Tobacco Use    Smoking status: Never    Smokeless tobacco: Never   Substance Use Topics    Alcohol use: Not Currently     Comment: occasional        Fam Hx  Family History   Problem Relation Age of Onset    Hypertension Father     Heart Disorder Father     Cancer Mother         breast    Other (Other[other]) Mother         glaucoma    Hypertension Maternal Grandmother      Other (Other[other]) Maternal Grandfather         glaucoma    Diabetes Brother        Review of Systems  Comprehensive ROS reviewed and negative except for what's stated above.     OBJECTIVE:  /63   Pulse 73   Temp 97 °F (36.1 °C)   Resp 16   Ht 5' 1\" (1.549 m)   Wt 194 lb (88 kg)   SpO2 99%   BMI 36.66 kg/m²   General:  Alert, no distress   Head:  Normocephalic   Eyes:  Sclera anicteric   Nose: Nares normal. Septum midline   Throat: Lips, mucosa, and tongue normal.    Neck: Supple, symmetrical, trachea midline   Lungs:   Clear to auscultation bilaterally. Normal effort   Chest wall:  No tenderness or deformity.   Heart:  Regular rate and rhythm, S1, S2 normal,   Abdomen:   Large ventral hernia soft reducible positive bowel sounds   Extremities: Surgical bandage over the left hip, bandage clean dry and intact   Skin: Skin color, texture, turgor normal.    Neurologic: Normal strength, no focal deficit appreciated     Diagnostic Data:    CBC/Chem  No results for input(s): \"WBC\", \"HGB\", \"MCV\", \"PLT\", \"BAND\", \"INR\" in the last 168 hours.    Invalid input(s): \"LYM#\", \"MONO#\", \"BASOS#\", \"EOSIN#\"    No results for input(s): \"NA\", \"K\", \"CL\", \"CO2\", \"BUN\", \"CREATSERUM\", \"GLU\", \"CA\", \"CAION\", \"MG\", \"PHOS\" in the last 168 hours.    No results for input(s): \"ALT\", \"AST\", \"ALB\", \"AMYLASE\", \"LIPASE\", \"LDH\" in the last 168 hours.    Invalid input(s): \"ALPHOS\", \"TBIL\", \"DBIL\", \"TPROT\"    No results for input(s): \"TROP\" in the last 168 hours.        Radiology: XR FLUOROSCOPY C-ARM TIME LESS THAN 1 HOUR (CPT=76000)    Result Date: 5/10/2024  PROCEDURE: XR FLUORO PHYSICIAN TIME< 1 HOUR (CPT=76000)  COMPARISON: [facility], XR PELVIS (1 VIEW) (CPT=72170), 8/04/2023, 2:19 PM.  [facility], XR FLUOROSCOPY C-ARM  TIME< 1 HOUR (CPT=76000), 8/04/2023, 1:00 PM.  INDICATIONS: Left hip joint osteoarthritis.  TECHNIQUE: Intraoperative fluoroscopy was provided during performance of a left total hip  arthroplasty.   FLUOROSCOPY IMAGES OBTAINED:  1 (Dr. Omar Behery) FLUOROSCOPY TIME:  17.2 seconds RADIATION DOSE (Dose Area Product):  [id number]-[id number]*m^2    Dictated by (CST): Tapan Zuluaga MD on 5/10/2024 at 2:56 PM     Finalized by (CST): Tapan Zuluaga MD on 5/10/2024 at 2:57 PM             ASSESSMENT / PLAN:   Patient is a 63 year old female with PMH breast cancer, HLD, HTN, OA, MDD, glaucoma, hypothyroidism who presents for elective left total hip arthroplasty, patient recently had right total hip arthroplasty without complications admitted to the hospital for observation medical service was consulted for postoperative medical management.       Osteoarthritis status post left total hip arthroplasty  -Postoperative pain management, anticoagulation IV fluids per surgical service  -PT OT as ordered  -Patient lives alone so plan is to go to subacute rehab    2.  Prediabetes  -Hold home metformin  Low-dose correction factor insulin    3.  Hypertension continue home losartan dose in the morning    4.  Hypothyroidism continue home Synthroid patient did not take this this morning restart tomorrow      5.  Hyperlipidemia continue home Crestor dose this evening    6.  Glaucoma continue home eyedrops    7.  History of breast cancer on anastrozole held for procedure continue to hold while inpatient resume when safe at discharge    8.  Obesity recommended diet exercise and weight loss    FN:  - IVF:Per ortho   - Diet:PIV     DVT Prophy: ASA BID as ordered   Lines:PIV    FULL CODE confirmed on admission     Dispo: pending clinical course    Outpatient records or previous hospital records reviewed.     Further recommendations pending patient's clinical course.  [facility] hospitalist to continue to follow patient while in house    Patient and/or patient's family given opportunity to ask questions and note understanding and agreeing with therapeutic plan as outlined    Thank You,  Flaco Mendiola,     Hospitalist with [facility]  [facility] Service number: [phone]

## 2024-05-10 NOTE — ANESTHESIA PREPROCEDURE EVALUATION
Anesthesia PreOp Note    HPI:     Sharifa Flores is a 63 year old female who presents for preoperative consultation requested by: Behery, Omar Atef, MD    Date of Surgery: 5/10/2024    Procedure(s):  Left Total Hip Arthroplasty Anterior Approach  Indication: Left Hip Osteoarthritis    Relevant Problems   No relevant active problems       NPO:  Last Liquid Consumption Date: 05/10/24  Last Liquid Consumption Time: 0000  Last Solid Consumption Date: 05/09/24  Last Solid Consumption Time: 2300  Last Liquid Consumption Date: 05/10/24          History Review:  Patient Active Problem List    Diagnosis Date Noted    Preop testing 08/03/2023    Palpitation 06/06/2017    Mixed hyperlipidemia 06/06/2017    Essential hypertension     Malignant neoplasm of upper-outer quadrant of left female breast (HCC) 01/13/2017    Mild single current episode of major depressive disorder (HCC) 05/20/2016    Arthralgia of right hip 03/02/2016    Sleep apnea, unspecified 10/19/2015    Tired 10/19/2015    Impaired glucose tolerance 01/26/2015    Benign essential hypertension 07/09/2014    Hypothyroidism 03/07/2014       Past Medical History:    Back problem    Breast cancer (HCC)    left side    Depression    Diabetes (HCC)    Disorder of thyroid    Essential hypertension    Exposure to medical diagnostic radiation    Glaucoma    High blood pressure    High cholesterol    Hyperthyroidism    IBS (irritable bowel syndrome)    diarrhea    Incontinence    bladder    Mixed hyperlipidemia    Osteoarthritis    Palpitation    Personal history of antineoplastic chemotherapy    PONV (postoperative nausea and vomiting)    Precordial pain    Sleep apnea    CPAP    Visual impairment    readers       Past Surgical History:   Procedure Laterality Date    Breast biopsy Left     benign    Cholecystectomy      Colonoscopy      Mastectomy left      Mastectomy modified radical Bilateral     Mastectomy right      Other surgical history      port was removed.     Total hip replacement Right 08/04/2023    Right Total Hip Arthroplasty       Medications Prior to Admission   Medication Sig Dispense Refill Last Dose    mupirocin 2 % External Ointment Apply topically 2 (two) times daily. To bilateral nares as ordered by surgeon   5/9/2024    Vitamin C 500 MG Oral Tab Take 1 tablet (500 mg total) by mouth daily.   5/4/2024    acetaminophen 500 MG Oral Tab Take 2 tablets (1,000 mg total) by mouth every 6 (six) hours as needed for Pain.   5/10/2024 at 0100    CALCIUM MAGNESIUM ZINC OR Take 1 tablet by mouth daily.   5/4/2024    venlafaxine  MG Oral Capsule SR 24 Hr Take 1 capsule (150 mg total) by mouth daily. 90 capsule 0 5/9/2024 at 1600    LEVOTHYROXINE 88 MCG Oral Tab TAKE ONE TABLET BY MOUTH EVERY MORNING 90 tablet 0     timolol 0.5 % Ophthalmic Solution Place 1 drop into both eyes daily. 1 drop left eye in the am       rosuvastatin 20 MG Oral Tab Take 1 tablet (20 mg total) by mouth nightly. 1 tablet nightly 90 tablet 1 5/10/2024 at 0100    Cholecalciferol (VITAMIN D-3 OR) Take by mouth. 1000 units daily   5/4/2024    Multiple Vitamins-Minerals (MULTI-VITAMIN/MINERALS) Oral Tab Take 1 tablet by mouth daily.       B Complex Vitamins (VITAMIN B COMPLEX OR) Take 1 tablet by mouth daily.       metFORMIN HCl  MG Oral Tablet 24 Hr Take 1 tablet (500 mg total) by mouth daily with breakfast. 180 tablet 1 5/9/2024 at 1600    latanoprost 0.005 % Ophthalmic Solution Place 1 drop into both eyes nightly.       anastrozole 1 MG Oral Tab tab Take 1 tablet (1 mg total) by mouth daily.   5/6/2024    losartan Potassium 50 MG Oral Tab Take 1 tablet (50 mg total) by mouth.   5/9/2024 at 1200    Glucosamine-Chondroitin (GLUCOSAMINE CHONDR COMPLEX OR) Take 1 tablet by mouth daily.   5/4/2024    loratadine 10 MG Oral Tab Take 1 tablet (10 mg total) by mouth as needed for Allergies.   Unknown     Current Facility-Administered Medications Ordered in Epic   Medication Dose Route Frequency  Provider Last Rate Last Admin    lactated ringers infusion   Intravenous Continuous Behery, Omar Atef, MD        acetaminophen (Tylenol Extra Strength) tab 1,000 mg  1,000 mg Oral Once Behery, Omar Atef, MD        ceFAZolin (Ancef) 2 g in 20mL IV syringe premix  2 g Intravenous Once Ru Anderson PA-C        povidone-iodine (Betadine) 10 % 17.5 mL in sodium chloride 0.9 % 500 mL irrigation   Irrigation Once (Intra-Op) Behery, Omar Atef, MD        clonidine-EPINEPHrine-ropivacaine-ketorolac (CERTS) (Duraclon-Adrenalin-Naropin-Toradol) pain cocktail irrigation   Intra-articular Once (Intra-Op) Behery, Omar Atef, MD         No current Epic-ordered outpatient medications on file.       Allergies   Allergen Reactions    Codeine PAIN     headache    Oxycontin [Oxycodone] NAUSEA AND VOMITING     Severe n/v per pt    Penicillins UNKNOWN     Brother with severe reactions, pt unable to remember if she had any    Soy Allergy DIARRHEA    Tegaderm Ag Mesh 2\"X2\" [Wound Dressings] RASH     Long term exposure       Family History   Problem Relation Age of Onset    Hypertension Father     Heart Disorder Father     Cancer Mother         breast    Other (Other[other]) Mother         glaucoma    Hypertension Maternal Grandmother     Other (Other[other]) Maternal Grandfather         glaucoma    Diabetes Brother      Social History     Socioeconomic History    Marital status: Single   Tobacco Use    Smoking status: Never    Smokeless tobacco: Never   Substance and Sexual Activity    Alcohol use: Not Currently     Comment: occasional    Drug use: No       Available pre-op labs reviewed.             Vital Signs:  Body mass index is 36.66 kg/m².   height is 1.549 m (5' 1\") and weight is 88 kg (194 lb). Her oral temperature is 98.1 °F (36.7 °C). Her blood pressure is 141/78 and her pulse is 76. Her respiration is 18 and oxygen saturation is 99%.   Vitals:    05/07/24 1103 05/10/24 1201   BP:  141/78   Pulse:  76   Resp:  18    Temp:  98.1 °F (36.7 °C)   TempSrc:  Oral   SpO2:  99%   Weight: 88 kg (194 lb) 88 kg (194 lb)   Height: 1.549 m (5' 1\") 1.549 m (5' 1\")        Anesthesia Evaluation     Patient summary reviewed and Nursing notes reviewed    History of anesthetic complications (ponv)   Airway   Mallampati: II  TM distance: >3 FB  Neck ROM: full  Dental - Dentition appears grossly intact     Pulmonary - normal exam   (+) sleep apnea  Cardiovascular - normal exam  (+) hypertension    ECG reviewed    Neuro/Psych    (+)   depression      GI/Hepatic/Renal - negative ROS     Endo/Other    (+) diabetes mellitus, hypothyroidism  Abdominal                  Anesthesia Plan:   ASA:  3  Plan:   Spinal  Plan Comments: I have informed Sharifa Flores of the risks of spinal anesthesia including, but not limited to: failure, headache, backache, difficulty breathing, infection, bleeding, nerve damage, paralysis, death. The patient desires the proposed anesthetic as planned.    PLT>100, no blood thinners  Informed Consent Plan and Risks Discussed With:  Patient      I have informed Sharifa Flores and/or legal guardian or family member of the nature of the anesthetic plan, benefits, risks including possible dental damage if relevant, major complications, and any alternative forms of anesthetic management.   All of the patient's questions were answered to the best of my ability. The patient desires the anesthetic management as planned.  Don Davison MD  5/10/2024 12:17 PM  Present on Admission:  **None**

## 2024-05-10 NOTE — ANESTHESIA PROCEDURE NOTES
Spinal Block    Date/Time: 5/10/2024 1:12 PM    Performed by: Don Davison MD  Authorized by: Don Davison MD      General Information and Staff    Start Time:  5/10/2024 1:12 PM  End Time:  5/10/2024 1:15 PM  Anesthesiologist:  Don Davison MD  Performed by:  Anesthesiologist  Patient Location:  OR  Site identification: surface landmarks    Reason for Block: at surgeon's request and surgical anesthesia    Preanesthetic Checklist: patient identified, IV checked, risks and benefits discussed, monitors and equipment checked, pre-op evaluation, timeout performed, anesthesia consent and sterile technique used      Procedure Details    Patient Position:  Sitting  Prep: ChloraPrep    Monitoring:  Cardiac monitor, heart rate and continuous pulse ox  Approach:  Midline  Location:  L3-4  Injection Technique:  Single-shot    Needle    Needle Type:  Sprotte  Needle Gauge:  24 G  Needle Length:  3.5 in    Assessment    Sensory Level:   Events: clear CSF, CSF aspirated, well tolerated and blood negative      Additional Comments     Atraumatic on first pass, CSF aspirated and slow injection of med

## 2024-05-10 NOTE — ANESTHESIA PROCEDURE NOTES
Peripheral IV  Date/Time: 5/10/2024 1:00 PM  Inserted by: Don Davison MD    Placement  Needle size: 20 G  Laterality: right  Location: antecubital  Site prep: alcohol  Technique: ultrasound guided  Attempts: 1

## 2024-05-11 LAB
ANION GAP SERPL CALC-SCNC: 4 MMOL/L (ref 0–18)
BUN BLD-MCNC: 12 MG/DL (ref 9–23)
BUN/CREAT SERPL: 16.9 (ref 10–20)
CALCIUM BLD-MCNC: 9.4 MG/DL (ref 8.7–10.4)
CHLORIDE SERPL-SCNC: 107 MMOL/L (ref 98–112)
CO2 SERPL-SCNC: 29 MMOL/L (ref 21–32)
CREAT BLD-MCNC: 0.71 MG/DL
DEPRECATED RDW RBC AUTO: 43.1 FL (ref 35.1–46.3)
EGFRCR SERPLBLD CKD-EPI 2021: 94 ML/MIN/1.73M2 (ref 60–?)
ERYTHROCYTE [DISTWIDTH] IN BLOOD BY AUTOMATED COUNT: 12.9 % (ref 11–15)
EST. AVERAGE GLUCOSE BLD GHB EST-MCNC: 140 MG/DL (ref 68–126)
GLUCOSE BLD-MCNC: 153 MG/DL (ref 70–99)
GLUCOSE BLDC GLUCOMTR-MCNC: 136 MG/DL (ref 70–99)
GLUCOSE BLDC GLUCOMTR-MCNC: 137 MG/DL (ref 70–99)
GLUCOSE BLDC GLUCOMTR-MCNC: 145 MG/DL (ref 70–99)
GLUCOSE BLDC GLUCOMTR-MCNC: 165 MG/DL (ref 70–99)
HBA1C MFR BLD: 6.5 % (ref ?–5.7)
HCT VFR BLD AUTO: 32.8 %
HGB BLD-MCNC: 10.6 G/DL
MCH RBC QN AUTO: 29.5 PG (ref 26–34)
MCHC RBC AUTO-ENTMCNC: 32.3 G/DL (ref 31–37)
MCV RBC AUTO: 91.4 FL
OSMOLALITY SERPL CALC.SUM OF ELEC: 293 MOSM/KG (ref 275–295)
PLATELET # BLD AUTO: 281 10(3)UL (ref 150–450)
POTASSIUM SERPL-SCNC: 3.7 MMOL/L (ref 3.5–5.1)
RBC # BLD AUTO: 3.59 X10(6)UL
SODIUM SERPL-SCNC: 140 MMOL/L (ref 136–145)
WBC # BLD AUTO: 11.4 X10(3) UL (ref 4–11)

## 2024-05-11 PROCEDURE — 80048 BASIC METABOLIC PNL TOTAL CA: CPT | Performed by: INTERNAL MEDICINE

## 2024-05-11 PROCEDURE — 85027 COMPLETE CBC AUTOMATED: CPT | Performed by: INTERNAL MEDICINE

## 2024-05-11 PROCEDURE — 83036 HEMOGLOBIN GLYCOSYLATED A1C: CPT | Performed by: INTERNAL MEDICINE

## 2024-05-11 PROCEDURE — 97116 GAIT TRAINING THERAPY: CPT

## 2024-05-11 PROCEDURE — 97530 THERAPEUTIC ACTIVITIES: CPT

## 2024-05-11 PROCEDURE — 82962 GLUCOSE BLOOD TEST: CPT

## 2024-05-11 PROCEDURE — 97162 PT EVAL MOD COMPLEX 30 MIN: CPT

## 2024-05-11 PROCEDURE — 97165 OT EVAL LOW COMPLEX 30 MIN: CPT

## 2024-05-11 NOTE — PLAN OF CARE
POD 1 of LTHA. Pt is alert and oriented x4. On room air. CPAP HS. Asa for dvt prophylaxis. On tele no calls this shift. Voiding freely. Pain managed with scheduled meds. Ambulating x1 with walker. Pt wants rehab, difficulty ambulating in multi level home and no help. Plan TBD.       Problem: Patient Centered Care  Goal: Patient preferences are identified and integrated in the patient's plan of care  Description: Interventions:    - Provide timely, complete, and accurate information to patient/family  - Incorporate patient and family knowledge, values, beliefs, and cultural backgrounds into the planning and delivery of care  - Encourage patient/family to participate in care and decision-making at the level they choose  - Honor patient and family perspectives and choices  Outcome: Progressing     Problem: Diabetes/Glucose Control  Goal: Glucose maintained within prescribed range  Description: INTERVENTIONS:  - Monitor Blood Glucose as ordered  - Assess for signs and symptoms of hyperglycemia and hypoglycemia  - Administer ordered medications to maintain glucose within target range  - Assess barriers to adequate nutritional intake and initiate nutrition consult as needed  - Instruct patient on self management of diabetes  Outcome: Progressing     Problem: PAIN - ADULT  Goal: Verbalizes/displays adequate comfort level or patient's stated pain goal  Description: INTERVENTIONS:  - Encourage pt to monitor pain and request assistance  - Assess pain using appropriate pain scale  - Administer analgesics based on type and severity of pain and evaluate response  - Implement non-pharmacological measures as appropriate and evaluate response  - Consider cultural and social influences on pain and pain management  - Manage/alleviate anxiety  - Utilize distraction and/or relaxation techniques  - Monitor for opioid side effects  - Notify MD/LIP if interventions unsuccessful or patient reports new pain  - Anticipate increased pain with  activity and pre-medicate as appropriate  Outcome: Progressing     Problem: RISK FOR INFECTION - ADULT  Goal: Absence of fever/infection during anticipated neutropenic period  Description: INTERVENTIONS  - Monitor WBC  - Administer growth factors as ordered  - Implement neutropenic guidelines  Outcome: Progressing     Problem: SAFETY ADULT - FALL  Goal: Free from fall injury  Description: INTERVENTIONS:  - Assess pt frequently for physical needs  - Identify cognitive and physical deficits and behaviors that affect risk of falls.  - [place] fall precautions as indicated by assessment.  - Educate pt/family on patient safety including physical limitations  - Instruct pt to call for assistance with activity based on assessment  - Modify environment to reduce risk of injury  - Provide assistive devices as appropriate  - Consider OT/PT consult to assist with strengthening/mobility  - Encourage toileting schedule  Outcome: Progressing     Problem: DISCHARGE PLANNING  Goal: Discharge to home or other facility with appropriate resources  Description: INTERVENTIONS:  - Identify barriers to discharge w/pt and caregiver  - Include patient/family/discharge partner in discharge planning  - Arrange for needed discharge resources and transportation as appropriate  - Identify discharge learning needs (meds, wound care, etc)  - Arrange for interpreters to assist at discharge as needed  - Consider post-discharge preferences of patient/family/discharge partner  - Complete POLST form as appropriate  - Assess patient's ability to be responsible for managing their own health  - Refer to Case Management Department for coordinating discharge planning if the patient needs post-hospital services based on physician/LIP order or complex needs related to functional status, cognitive ability or social support system  Outcome: Progressing

## 2024-05-11 NOTE — OCCUPATIONAL THERAPY NOTE
OCCUPATIONAL THERAPY EVALUATION - INPATIENT     Room Number: 416/416-A  Evaluation Date: 5/11/2024  Type of Evaluation: Initial  Presenting Problem: L PRAFUL    Physician Order: IP Consult to Occupational Therapy  Reason for Therapy: ADL/IADL Dysfunction and Discharge Planning    OCCUPATIONAL THERAPY ASSESSMENT   Patient is a 63 year old female admitted 5/10/2024 for L PRAFUL, WBAT.  Prior to admission, patient's baseline is independent with ADLs and ambulation, works, drives.  Patient is currently functioning below baseline with  self care/ADLs .  Patient is requiring contact guard assist as a result of the following impairments: decreased functional strength. Occupational Therapy will continue to follow for duration of hospitalization.    Patient will benefit from continued skilled OT Services to promote return to prior level of function and safety with continuous assistance and continued therapy services, pt wants to go to rehab before returning home as she has no assist at home     PLAN  OT Treatment Plan: Balance activities;ADL training;Functional transfer training;Endurance training;Patient/Family education;Compensatory technique education     OCCUPATIONAL THERAPY MEDICAL/SOCIAL HISTORY   Problem List   Active Problems:    * No active hospital problems. *    HOME SITUATION  Type of Home: House  Home Layout: Multi-level  Lives With: Alone  Toilet and Equipment: Standard height toilet  Shower/Tub and Equipment: Walk-in shower  Other Equipment: -- (rw)  Drives: Yes    Stairs in Home:14 stairs  Assistive Device(s) Used: owns a RW and cane     Prior Level of [place]: Pt lives alone in a multi level home. She is (I) at baseline. 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\"    OCCUPATIONAL THERAPY EXAMINATION   OBJECTIVE  Precautions: Limb alert - left  Fall Risk: Standard fall risk    WEIGHT BEARING RESTRICTION  L Lower Extremity: Weight Bearing as Tolerated    PAIN  ASSESSMENT  Ratin    ACTIVITY TOLERANCE                         O2 SATURATIONS       COGNITION  Overall Cognitive Status:  WFL - within functional limits    RANGE OF MOTION   Upper extremity ROM is within functional limits     STRENGTH ASSESSMENT  Upper extremity strength is within functional limits     ACTIVITIES OF DAILY LIVING ASSESSMENT  AM-PAC ‘6-Clicks’ Inpatient Daily Activity Short Form  How much help from another person does the patient currently need…  -   Putting on and taking off regular lower body clothing?: A Little  -   Bathing (including washing, rinsing, drying)?: A Little  -   Toileting, which includes using toilet, bedpan or urinal? : A Little  -   Putting on and taking off regular upper body clothing?: A Little  -   Taking care of personal grooming such as brushing teeth?: A Little  -   Eating meals?: None    AM-PAC Score:  Score: 19  Approx Degree of Impairment: 42.8%  Standardized Score (AM-PAC Scale): 40.22  CMS Modifier (G-Code): CK    FUNCTIONAL TRANSFER ASSESSMENT  Sit to Stand: Edge of Bed  Edge of Bed: Contact Guard Assist    BED MOBILITY  Supine to Sit : Contact Guard Assist    BALANCE ASSESSMENT  Static Sitting: Stand-by Assist  Sitting Unilateral: Stand-by Assist  Static Standing: Stand-by Assist  Standing Unilateral: Contact Guard Assist    FUNCTIONAL ADL ASSESSMENT  Eating: Independent  Grooming Standing: Stand-by Assist (Pt stood at the sink x5 minutes to brush her teeth, wash her face and hands.)  UB Dressing Seated: Independent  LB Dressing Seated: Stand-by Assist (Pt utilizes a sock aide and reacher at baseline to don/doff socks and declined further practice. Verbally reviewed how to don/doff pants with a reacher.)  Toileting Standing: Contact Guard Assist    THERAPEUTIC EXERCISE     Skilled Therapy Provided: RN approved session. Pt received in the bed, agreeable to tx. Pt reports \"0/10\" pain in left hip. She is ambulating and transferring at [facility] level with RW. Pt had her  right hip replaced in . 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 Will continue to follow.      EDUCATION PROVIDED  Patient: Role of Occupational Therapy; Plan of Care; Discharge Recommendations; Functional Transfer Techniques; Compensatory ADL Techniques  Patient's Response to Education: Verbalized Understanding    The patient's Approx Degree of Impairment: 42.8% has been calculated based on documentation in the [facility] '6 clicks' Inpatient Daily Activity Short Form.  Research supports that patients with this level of impairment may benefit from GR.  Final disposition will be made by interdisciplinary medical team.    Patient End of Session: Up in chair;Needs met;Call light within reach;RN aware of session/findings;All patient questions and concerns addressed    OT Goals  [facility]     Patient will complete LE dressing with supervision  Comment:     Patient will complete toilet transfer with supervision  Comment:     Patient will complete self care task at sink level with supervision   Comment:     Comment:         Goals  on:24  Frequency:3x/week    Patient Evaluation Complexity Level:   Occupational Profile/Medical History LOW - Brief history including review of medical or therapy records    Specific performance deficits impacting engagement in ADL/IADL LOW  1 - 3 performance deficits    Client Assessment/Performance Deficits LOW - No comorbidities nor modifications of tasks    Clinical Decision Making LOW - Analysis of occupational profile, problem-focused assessments, limited treatment options    Overall Complexity LOW     OT Session     Therapeutic Activity: 10 minutes

## 2024-05-11 NOTE — PHYSICAL THERAPY NOTE
PHYSICAL THERAPY EVALUATION - INPATIENT     Room Number: 416/416-A  Evaluation Date: 5/11/2024  Type of Evaluation: Initial   Physician Order: PT Eval and Treat    Presenting Problem: L PRAFUL  Co-Morbidities : h/o R PRAFUL '23  Reason for Therapy: Mobility Dysfunction and Discharge Planning    PHYSICAL THERAPY ASSESSMENT   Patient is a 63 year old female admitted 5/10/2024 for L PRAFUL anterior approach.  Prior to admission, patient's baseline is independent.  Patient is currently functioning near baseline with bed mobility, transfers, and gait.  Patient is requiring supervision and contact guard assist as a result of the following impairments: decreased endurance/aerobic capacity and medical status.  Physical Therapy will continue to follow for duration of hospitalization.    Patient will benefit from continued skilled PT Services to promote return to prior level of function and safety with continuous assistance and continued therapy services, pt wants to go to rehab before returning home as she has no assist at home.     PLAN  PT Treatment Plan: Bed mobility;Body mechanics;Endurance;Energy conservation;Gait training;Family education;Range of motion;Stoop training;Transfer training;Balance training  Rehab Potential : Good  Frequency (Obs): Daily    PHYSICAL THERAPY MEDICAL/SOCIAL HISTORY   History related to current admission: arthritis      Problem List  Active Problems:    * No active hospital problems. 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  Patient Difficulty: Turning over in bed (including adjusting bedclothes, sheets and blankets)?: A Little   Patient Difficulty: Sitting down on and standing up from a chair with arms (e.g., wheelchair, bedside commode, etc.): A Little   Patient Difficulty: Moving from lying on back to sitting on the side of the bed?: A Little   How much help from another person does the patient currently need...   Help from Another: Moving to and from a bed to a chair (including a wheelchair)?: A Little   Help from Another: Need to walk in hospital room?: A Little   Help from Another: Climbing 3-5 steps with a railing?: A Little     AM-PAC Score:  Raw Score: 18   Approx Degree of Impairment: 46.58%   Standardized Score (AM-PAC Scale): 43.63   CMS Modifier (G-Code): CK    FUNCTIONAL ABILITY STATUS  Functional Mobility/Gait Assessment  Gait Assistance: Contact guard assist;Supervision (CGA progressed to supervision)  Distance (ft): 250'  Assistive Device: Rolling walker  Pattern: L Decreased stance time  Stairs:  (pt refusing trial this session)  Rolling: contact guard assist  Supine to Sit: contact guard assist at surgical LE  Sit to Supine: contact guard assist  Sit to Stand: stand-by assist    Exercise/Education Provided:  Bed mobility  Body mechanics  Functional activity tolerated  Gait training  Transfer training    The patient's Approx Degree of Impairment: 46.58% has been calculated based on documentation in the [facility] '6 clicks' Inpatient Basic Mobility Short Form.  Research supports that patients with this level of impairment may benefit from home with home care.  Final  disposition will be made by interdisciplinary medical team.    Patient End of Session: Up in chair;Needs met;RN aware of session/findings;Call light within reach;All patient questions and concerns addressed;Alarm set    CURRENT GOALS  Goals to be met by: 5/30  Patient Goal Patient's self-stated goal is: to go to rehab   Goal #1 Patient is able to demonstrate supine - sit EOB @ level: independent     Goal #1   Current Status    Goal #2 Patient is able to demonstrate transfers Sit to/from Stand at assistance level: supervision with walker - rolling     Goal #2  Current Status    Goal #3 Patient is able to ambulate 500 feet with assist device: walker - rolling at assistance level: supervision   Goal #3   Current Status    Goal #4 Patient will negotiate 14 stairs/one curb w/ assistive device and supervision   Goal #4   Current Status    Goal #5 Patient to demonstrate independence with home activity/exercise instructions provided to patient in preparation for discharge.   Goal #5   Current Status    Goal #6    Goal #6  Current Status      Patient Evaluation Complexity Level:  History Moderate - 1 or 2 personal factors and/or co-morbidities   Examination of body systems Moderate - addressing a total of 3 or more elements   Clinical Presentation  Moderate - Evolving   Clinical Decision Making  Moderate Complexity     Gait Training: 15 minutes

## 2024-05-11 NOTE — PROGRESS NOTES
[facility] Hospitalist Progress Note     CC: Hospital Follow up    PCP: Ellie North DO       Assessment/Plan:     Active Problems:    * No active hospital problems. *      Patient is a 63 year old female with PMH breast cancer, HLD, HTN, OA, MDD, glaucoma, hypothyroidism who presents for elective left total hip arthroplasty, patient recently had right total hip arthroplasty without complications admitted to the hospital for observation medical service was consulted for postoperative medical management.         Osteoarthritis status post left total hip arthroplasty  -Pain well controlled  PT OT pending patient requests [facility]   DVT proph as ordered  Expect post op acute blood loss anemia      2.  Prediabetes  -Hold home metformin  Low-dose correction factor insulin  Checking A1c      3.  Hypertension continue home losartan      4.  Hypothyroidism continue home Synthroid         5.  Hyperlipidemia continue home Crestor      6.  Glaucoma continue home eyedrops     7.  History of breast cancer on anastrozole held for procedure continue to hold while inpatient resume when safe at discharge     8.  Obesity recommended diet exercise and weight loss     FN:  - IVF:Stop   - Diet:PIV     Prophylaxis:   DVT with 81 mg BID     Questions/concerns were discussed with patient and/or family by bedside.    Thank You,  Flaco Mendiola DO    Hospitalist with [facility]     Subjective:       Felt slightly dizzy, no nausea, pain in left hip well controlled, no fevers or chills     OBJECTIVE:    Blood pressure 127/69, pulse 83, temperature 97.7 °F (36.5 °C), temperature source Oral, resp. rate 16, height 5' 1\" (1.549 m), weight 194 lb (88 kg), SpO2 100%.    Temp:  [97 °F (36.1 °C)-98.7 °F (37.1 °C)] 97.7 °F (36.5 °C)  Pulse:  [59-86] 83  Resp:  [12-18] 16  BP: (102-164)/(62-99) 127/69  SpO2:  [89 %-100 %] 100 %      Intake/Output:    Intake/Output Summary (Last 24 hours) at 5/11/2024 0915  Last data filed at 5/10/2024  1901  Gross per 24 hour   Intake 1100 ml   Output 600 ml   Net 500 ml       Last 3 Weights   05/10/24 1201 194 lb (88 kg)   05/07/24 1103 194 lb (88 kg)   08/04/23 0921 190 lb (86.2 kg)   07/31/23 1452 197 lb (89.4 kg)   01/27/22 1042 199 lb (90.3 kg)       Exam   Gen: No acute distress, alert and oriented x3  Pulm: Lungs clear, normal respiratory effort  CV: Heart with regular rate and rhythm  Abd: Abdomen soft, nontender, non-distended  MSK:left hip bandage CDI         Data Review:       Labs:     Recent Labs   Lab 05/11/24  0356   RBC 3.59*   HGB 10.6*   HCT 32.8*   MCV 91.4   MCH 29.5   MCHC 32.3   RDW 12.9   WBC 11.4*   .0         Recent Labs   Lab 05/11/24  0356   *   BUN 12   CREATSERUM 0.71   EGFRCR 94   CA 9.4      K 3.7      CO2 29.0       No results for input(s): \"ALT\", \"AST\", \"ALB\", \"AMYLASE\", \"LIPASE\", \"LDH\" in the last 168 hours.    Invalid input(s): \"ALPHOS\", \"TBIL\", \"DBIL\", \"TPROT\"      Imaging:  XR PELVIS (1 VIEW) (CPT=72170)    Result Date: 5/10/2024  CONCLUSION: Left hip arthroplasty.    Dictated by (CST): Shahriar Garcia MD on 5/10/2024 at 5:01 PM     Finalized by (CST): Shahriar Garcia MD on 5/10/2024 at 5:03 PM          XR FLUOROSCOPY C-ARM TIME LESS THAN 1 HOUR (CPT=76000)    Result Date: 5/10/2024  PROCEDURE: XR FLUORO PHYSICIAN TIME< 1 HOUR (CPT=76000)  COMPARISON: [facility], XR PELVIS (1 VIEW) (CPT=72170), 8/04/2023, 2:19 PM.  [facility], XR FLUOROSCOPY C-ARM  TIME< 1 HOUR (CPT=76000), 8/04/2023, 1:00 PM.  INDICATIONS: Left hip joint osteoarthritis.  TECHNIQUE: Intraoperative fluoroscopy was provided during performance of a left total hip arthroplasty.   FLUOROSCOPY IMAGES OBTAINED:  1 (Dr. Omar Behery) FLUOROSCOPY TIME:  17.2 seconds RADIATION DOSE (Dose Area Product):  [id number]*m^2    Dictated by (CST): Tapan Zuluaga MD on 5/10/2024 at 2:56 PM     Finalized by (CST): Tapan Zuluaga MD on 5/10/2024 at 2:57 PM              Meds:      aspirin  81 mg Oral BID    sennosides  17.2 mg Oral Nightly    docusate sodium  100 mg Oral BID    famotidine  20 mg Oral BID    Or    famotidine  20 mg Intravenous BID    acetaminophen  1,000 mg Oral q6h    ketorolac  15 mg Intravenous Q6H    traMADol  50 mg Oral 4 times per day    doxycycline  100 mg Oral 2 times per day    latanoprost  1 drop Both Eyes Nightly    levothyroxine  88 mcg Oral QAM    cetirizine  10 mg Oral Daily    losartan  50 mg Oral Daily    multivitamin with minerals  1 tablet Oral Daily    rosuvastatin  20 mg Oral Nightly    timolol  1 drop Both Eyes Daily    venlafaxine ER  150 mg Oral Daily    insulin aspart  1-5 Units Subcutaneous TID CC and HS         polyethylene glycol (PEG 3350)    magnesium hydroxide    bisacodyl    fleet enema    ondansetron    metoclopramide    diphenhydrAMINE **OR** diphenhydrAMINE    acetaminophen    acetaminophen **OR** HYDROcodone-acetaminophen **OR** HYDROcodone-acetaminophen

## 2024-05-11 NOTE — CM/SW NOTE
Received MDO for discharge planning. Patient is pre-ortho w/ [facility]. Referral and F2F sent via [employer].    Received message from Jenny MORROW, patient requesting rehab at [facility]. Spoke with patient for assessment. Patient lives alone in a 2 story home w/ 5 SUSI w/ 14 stairs to the bedroom and 12 stairs to the basement where the shower is located. Patient owns a cane and walker. She is independent with ADLs/IADLs. Patient went to [facility] last year following a PRAFUL. Patient does have family but not available to assist.    Discussed discharge plan in detail, patient reports she cannot go home and needs SNF placement and would like to go to [facility]. Discussed Plan B, home w/ home health. Patient does not think she can return home w/ home health and she does not have anyone who can help her or who she can stay with post hospitalization.    Initiated SNF referral via [employer], request sent to [facility] committee. Will need PASRR & insurance approval.    SVETLANA Andrade [phone]

## 2024-05-12 PROCEDURE — 97530 THERAPEUTIC ACTIVITIES: CPT

## 2024-05-12 PROCEDURE — 97110 THERAPEUTIC EXERCISES: CPT

## 2024-05-12 NOTE — PROGRESS NOTES
[facility] Hospitalist Progress Note     CC: Hospital Follow up    PCP: Ellie North DO       Assessment/Plan:     Active Problems:    * No active hospital problems. *      Patient is a 63 year old female with PMH breast cancer, HLD, HTN, OA, MDD, glaucoma, hypothyroidism who presents for elective left total hip arthroplasty, patient recently had right total hip arthroplasty without complications admitted to the hospital for observation medical service was consulted for postoperative medical management.         Osteoarthritis status post left total hip arthroplasty  -Pain well controlled  PT OT pending patient requests [facility] , did well with PT advised patient may not be approved she understool   DVT proph as ordered  Expected post op acute blood loss anemia - stable      2.  DM2 without complications   A1c 6.5  Discussed importance of accu-checks and normoglycemic control in post operative state for wound healing and infection prevention she understood but wants to have general diet and stop checking sugars   -Hold home metformin     3.  Hypertension continue home losartan      4.  Hypothyroidism continue home Synthroid         5.  Hyperlipidemia continue home Crestor      6.  Glaucoma continue home eyedrops     7.  History of breast cancer on anastrozole held for procedure continue to hold while inpatient resume when safe at discharge     8.  Obesity recommended diet exercise and weight loss     FN:  - IVF:Stop   - Diet:PIV     Prophylaxis:   DVT with 81 mg BID   Medically stable for discharge pending PT clearance vs [facility] dispo     Questions/concerns were discussed with patient and/or family by bedside.    Thank You,  Flaco Mendiola DO    Hospitalist with [facility]     Subjective:       Feels fine, worried about going home, no fevers or chills, states stairs at home will be tough for her     OBJECTIVE:    Blood pressure 114/71, pulse 85, temperature 97.7 °F (36.5 °C), temperature source Temporal,  resp. rate 18, height 5' 1\" (1.549 m), weight 194 lb (88 kg), SpO2 98%.    Temp:  [97.7 °F (36.5 °C)-98.2 °F (36.8 °C)] 97.7 °F (36.5 °C)  Pulse:  [83-97] 85  Resp:  [16-18] 18  BP: (111-117)/(59-71) 114/71  SpO2:  [96 %-99 %] 98 %      Intake/Output:  No intake or output data in the 24 hours ending 05/12/24 1036      Last 3 Weights   05/10/24 1201 194 lb (88 kg)   05/07/24 1103 194 lb (88 kg)   08/04/23 0921 190 lb (86.2 kg)   07/31/23 1452 197 lb (89.4 kg)   01/27/22 1042 199 lb (90.3 kg)       Exam   Gen: No acute distress, alert and oriented x3  Pulm: Lungs clear, normal respiratory effort  CV: Heart with regular rate and rhythm  Abd: Abdomen soft, nontender, non-distended  MSK:left hip bandage CDI         Data Review:       Labs:     Recent Labs   Lab 05/11/24  0356   RBC 3.59*   HGB 10.6*   HCT 32.8*   MCV 91.4   MCH 29.5   MCHC 32.3   RDW 12.9   WBC 11.4*   .0         Recent Labs   Lab 05/11/24  0356   *   BUN 12   CREATSERUM 0.71   EGFRCR 94   CA 9.4      K 3.7      CO2 29.0       No results for input(s): \"ALT\", \"AST\", \"ALB\", \"AMYLASE\", \"LIPASE\", \"LDH\" in the last 168 hours.    Invalid input(s): \"ALPHOS\", \"TBIL\", \"DBIL\", \"TPROT\"      Imaging:  XR PELVIS (1 VIEW) (CPT=72170)    Result Date: 5/10/2024  CONCLUSION: Left hip arthroplasty.    Dictated by (CST): Shahriar Garcia MD on 5/10/2024 at 5:01 PM     Finalized by (CST): Shahriar Garcia MD on 5/10/2024 at 5:03 PM          XR FLUOROSCOPY C-ARM TIME LESS THAN 1 HOUR (CPT=76000)    Result Date: 5/10/2024  PROCEDURE: XR FLUORO PHYSICIAN TIME< 1 HOUR (CPT=76000)  COMPARISON: [facility], XR PELVIS (1 VIEW) (CPT=72170), 8/04/2023, 2:19 PM.  [facility], XR FLUOROSCOPY C-ARM  TIME< 1 HOUR (CPT=76000), 8/04/2023, 1:00 PM.  INDICATIONS: Left hip joint osteoarthritis.  TECHNIQUE: Intraoperative fluoroscopy was provided during performance of a left total hip arthroplasty.   FLUOROSCOPY IMAGES OBTAINED:  1 (Dr. Gray  Behery) FLUOROSCOPY TIME:  17.2 seconds RADIATION DOSE (Dose Area Product):  [id number]*m^2    Dictated by (CST): Tapan Zuluaga MD on 5/10/2024 at 2:56 PM     Finalized by (CST): Tapan Zuluaga MD on 5/10/2024 at 2:57 PM             Meds:      aspirin  81 mg Oral BID    sennosides  17.2 mg Oral Nightly    docusate sodium  100 mg Oral BID    famotidine  20 mg Oral BID    Or    famotidine  20 mg Intravenous BID    acetaminophen  1,000 mg Oral q6h    ketorolac  15 mg Intravenous Q6H    traMADol  50 mg Oral 4 times per day    doxycycline  100 mg Oral 2 times per day    latanoprost  1 drop Both Eyes Nightly    levothyroxine  88 mcg Oral QAM    cetirizine  10 mg Oral Daily    losartan  50 mg Oral Daily    multivitamin with minerals  1 tablet Oral Daily    rosuvastatin  20 mg Oral Nightly    timolol  1 drop Both Eyes Daily    venlafaxine ER  150 mg Oral Daily         polyethylene glycol (PEG 3350)    magnesium hydroxide    bisacodyl    fleet enema    ondansetron    metoclopramide    diphenhydrAMINE **OR** diphenhydrAMINE    acetaminophen    acetaminophen **OR** HYDROcodone-acetaminophen **OR** HYDROcodone-acetaminophen

## 2024-05-12 NOTE — PLAN OF CARE
POD 2 of LTHA. Pt is alert and oriented x4. On room air. CPAP HS. Asa for dvt prophylaxis. On tele no calls this shift. Voiding freely. Pain managed with scheduled meds. Ambulating x1 with walker. Pt wants rehab, difficulty ambulating in multi level home and no help. Plan TBD.     Problem: Patient Centered Care  Goal: Patient preferences are identified and integrated in the patient's plan of care  Description: Interventions:  - Provide timely, complete, and accurate information to patient/family  - Incorporate patient and family knowledge, values, beliefs, and cultural backgrounds into the planning and delivery of care  - Encourage patient/family to participate in care and decision-making at the level they choose  - Honor patient and family perspectives and choices  Outcome: Progressing     Problem: Diabetes/Glucose Control  Goal: Glucose maintained within prescribed range  Description: INTERVENTIONS:  - Monitor Blood Glucose as ordered  - Assess for signs and symptoms of hyperglycemia and hypoglycemia  - Administer ordered medications to maintain glucose within target range  - Assess barriers to adequate nutritional intake and initiate nutrition consult as needed  - Instruct patient on self management of diabetes  Outcome: Progressing     Problem: PAIN - ADULT  Goal: Verbalizes/displays adequate comfort level or patient's stated pain goal  Description: INTERVENTIONS:  - Encourage pt to monitor pain and request assistance  - Assess pain using appropriate pain scale  - Administer analgesics based on type and severity of pain and evaluate response  - Implement non-pharmacological measures as appropriate and evaluate response  - Consider cultural and social influences on pain and pain management  - Manage/alleviate anxiety  - Utilize distraction and/or relaxation techniques  - Monitor for opioid side effects  - Notify MD/LIP if interventions unsuccessful or patient reports new pain  - Anticipate increased pain with  activity and pre-medicate as appropriate  Outcome: Progressing     Problem: RISK FOR INFECTION - ADULT  Goal: Absence of fever/infection during anticipated neutropenic period  Description: INTERVENTIONS  - Monitor WBC  - Administer growth factors as ordered  - Implement neutropenic guidelines  Outcome: Progressing     Problem: SAFETY ADULT - FALL  Goal: Free from fall injury  Description: INTERVENTIONS:  - Assess pt frequently for physical needs  - Identify cognitive and physical deficits and behaviors that affect risk of falls.  - [place] fall precautions as indicated by assessment.  - Educate pt/family on patient safety including physical limitations  - Instruct pt to call for assistance with activity based on assessment  - Modify environment to reduce risk of injury  - Provide assistive devices as appropriate  - Consider OT/PT consult to assist with strengthening/mobility  - Encourage toileting schedule  Outcome: Progressing     Problem: DISCHARGE PLANNING  Goal: Discharge to home or other facility with appropriate resources  Description: INTERVENTIONS:  - Identify barriers to discharge w/pt and caregiver  - Include patient/family/discharge partner in discharge planning  - Arrange for needed discharge resources and transportation as appropriate  - Identify discharge learning needs (meds, wound care, etc)  - Arrange for interpreters to assist at discharge as needed  - Consider post-discharge preferences of patient/family/discharge partner  - Complete POLST form as appropriate  - Assess patient's ability to be responsible for managing their own health  - Refer to Case Management Department for coordinating discharge planning if the patient needs post-hospital services based on physician/LIP order or complex needs related to functional status, cognitive ability or social support system  Outcome: Progressing

## 2024-05-12 NOTE — PHYSICAL THERAPY NOTE
PHYSICAL THERAPY TREATMENT NOTE - INPATIENT     Room Number: 416/416-A       Presenting Problem: L PRAFUL  Co-Morbidities : h/o R PRAFUL     Problem List  Active Problems:    * No active hospital problems. *      PHYSICAL THERAPY ASSESSMENT   Patient demonstrates good  progress this session, goals  remain in progress.    Patient continues to function below baseline with bed mobility, transfers, and gait.  Contributing factors to remaining limitations include decreased functional strength, decreased endurance/aerobic capacity, and pain.  Next session anticipate patient to progress bed mobility, transfers, and gait.  Physical Therapy will continue to follow patient for duration of hospitalization.    Patient continues to benefit from continued skilled PT services: to promote return to prior level of function and safety with continuous assistance and gradual rehabilitative therapy .    PLAN  PT Treatment Plan: Bed mobility;Coordination;Endurance;Gait training  Frequency (Obs): Daily    SUBJECTIVE  Pt reports being ready for PT RX    OBJECTIVE  Precautions: Limb alert - left    WEIGHT BEARING RESTRICTION           L Lower Extremity: Weight Bearing as Tolerated    PAIN ASSESSMENT   Ratin  Location: denies this date       BALANCE  Static Sitting: Good  Dynamic Sitting: Good  Static Standing: Fair +  Dynamic Standing: Fair    ACTIVITY TOLERANCE                          O2 WALK       AM-PAC '6-Clicks' INPATIENT SHORT FORM - BASIC MOBILITY  How much difficulty does the patient currently have...  Patient Difficulty: Turning over in bed (including adjusting bedclothes, sheets and blankets)?: A Little   Patient Difficulty: Sitting down on and standing up from a chair with arms (e.g., wheelchair, bedside commode, etc.): A Little   Patient Difficulty: Moving from lying on back to sitting on the side of the bed?: A Little   How much help from another person does the patient currently need...   Help from Another: Moving to and  from a bed to a chair (including a wheelchair)?: A Little   Help from Another: Need to walk in hospital room?: A Little   Help from Another: Climbing 3-5 steps with a railing?: A Little     AM-PAC Score:  Raw Score: 18   Approx Degree of Impairment: 46.58%   Standardized Score (AM-PAC Scale): 43.63   CMS Modifier (G-Code): CK    FUNCTIONAL ABILITY STATUS    Rolling: minimal assist  Supine to Sit: minimal assist  Sit to Supine: minimal assist  Sit to Stand: minimal assist    Additional information: Min a for bed mobility and transfer.Extra time provided to complete task.EOB sitting balance activity with emphasis on core stabilization.Pt amb 2 x 40 ft with RW and min a;Unsteady gait;Pt LOB with turning R side;one rest break provided.There ex.    The patient's Approx Degree of Impairment: 46.58% has been calculated based on documentation in the [facility] '6 clicks' Inpatient Daily Activity Short Form.  Research supports that patients with this level of impairment may benefit from Sub -acutr  Rehabilitation.  Final disposition will be made by interdisciplinary medical team.    THERAPEUTIC EXERCISES  Lower Extremity Ankle pumps  Glut sets  Quad sets     Position Supine       Patient End of Session: Up in chair;Call light within reach;RN aware of session/findings;All patient questions and concerns addressed    CURRENT GOALS     Patient Goal Patient's self-stated goal is: to go to rehab   Goal #1 Patient is able to demonstrate supine - sit EOB @ level: independent     Goal #1   Current Status Min a   Goal #2 Patient is able to demonstrate transfers Sit to/from Stand at assistance level: supervision with walker - rolling     Goal #2  Current Status Min a   Goal #3 Patient is able to ambulate 500 feet with assist device: walker - rolling at assistance level: supervision   Goal #3   Current Status Pt amb 2 x 40 ft with RW and min a;unsteady gait   Goal #4 Patient will negotiate 14 stairs/one curb w/ assistive device and  supervision   Goal #4   Current Status NT   Goal #5 Patient to demonstrate independence with home activity/exercise instructions provided to patient in preparation for discharge.   Goal #5   Current Status In progress   Goal #6    Goal #6  Current Status      Gait Training:  minutes  Therapeutic Activity: 15 minutes  Neuromuscular Re-education:  minutes  Therapeutic Exercise: 15 minutes  Canalith Repositioning:  minutes  Manual Therapy:  minutes  Can add/delete any of these

## 2024-05-12 NOTE — PLAN OF CARE
Patient Alert and Oriented x4. Standby assist with ambulation. Scheduled tramadol and toradol for pain. Fall precautions in place. Call light and personal belongings within arms reach. Plan for home vs rehab  Problem: Patient Centered Care  Goal: Patient preferences are identified and integrated in the patient's plan of care  Description: Interventions:  - What would you like us to know as we care for you?   - Provide timely, complete, and accurate information to patient/family  - Incorporate patient and family knowledge, values, beliefs, and cultural backgrounds into the planning and delivery of care  - Encourage patient/family to participate in care and decision-making at the level they choose  - Honor patient and family perspectives and choices  Outcome: Progressing     Problem: Diabetes/Glucose Control  Goal: Glucose maintained within prescribed range  Description: INTERVENTIONS:  - Monitor Blood Glucose as ordered  - Assess for signs and symptoms of hyperglycemia and hypoglycemia  - Administer ordered medications to maintain glucose within target range  - Assess barriers to adequate nutritional intake and initiate nutrition consult as needed  - Instruct patient on self management of diabetes  Outcome: Progressing     Problem: PAIN - ADULT  Goal: Verbalizes/displays adequate comfort level or patient's stated pain goal  Description: INTERVENTIONS:  - Encourage pt to monitor pain and request assistance  - Assess pain using appropriate pain scale  - Administer analgesics based on type and severity of pain and evaluate response  - Implement non-pharmacological measures as appropriate and evaluate response  - Consider cultural and social influences on pain and pain management  - Manage/alleviate anxiety  - Utilize distraction and/or relaxation techniques  - Monitor for opioid side effects  - Notify MD/LIP if interventions unsuccessful or patient reports new pain  - Anticipate increased pain with activity and  pre-medicate as appropriate  Outcome: Progressing

## 2024-05-13 PROCEDURE — 97530 THERAPEUTIC ACTIVITIES: CPT

## 2024-05-13 PROCEDURE — 97116 GAIT TRAINING THERAPY: CPT

## 2024-05-13 PROCEDURE — 94799 UNLISTED PULMONARY SVC/PX: CPT

## 2024-05-13 NOTE — CONGREGATE LIVING REVIEW
Congregate Living Authorization    [facility] ([facility]) has reviewed this case and the committee DOES NOT RECOMMEND discharge to a skilled nursing facility under skilled care.    The CLRC recommends:  Home with home health and any other appropriate caregiver assistance or medical equipment as needed vs respite in SNF.     Does not appear to have skilled services for [facility], but additional home support appears to be needed.    For questions regarding CLRC approval process, please contact the CM assigned to the case.  For questions regarding RN discharge workflow, please contact the unit Clinical Leader.

## 2024-05-13 NOTE — CM/SW NOTE
YADIEL followed up on DC planning.     Pt insisting on [facility]    [facility] - states they will start ins auth     Will needs PASRR  [facility] - sent    [facility] states \"We can accept as long as she can bring in her own supply of her oral chemo med Anastrozole if they plan on resuming it upon [facility] discharge\"     Will discuss with pt above    UPDATE:     YADIEL discussed medication above with the pt, who confirms she does not need to take it regularly and can stop it for a short term period of time.     PASRR completed/ uploaded    Insurance Auth needed for Rehab    If auth is not received - will need to plan for home with [facility]    PLAN: [facility] - pending auth    Zarina Stevens, LSW, MSW ext. [phone]

## 2024-05-13 NOTE — PROGRESS NOTES
[facility] Hospitalist Progress Note     CC: Hospital Follow up    PCP: Ellie North DO       Assessment/Plan:     Active Problems:    * No active hospital problems. *      Patient is a 63 year old female with PMH breast cancer, HLD, HTN, OA, MDD, glaucoma, hypothyroidism who presents for elective left total hip arthroplasty, patient recently had right total hip arthroplasty without complications admitted to the hospital for observation medical service was consulted for postoperative medical management.         Osteoarthritis status post left total hip arthroplasty  -Pain well controlled  PT OT pending patient requests [facility] seems PT suggesting home with home health  aware , medically stable for discharge once location identified   DVT proph as ordered  Expected post op acute blood loss anemia - stable    Post op doxy ordered  ASA 81 mg BID     2.  DM2 without complications   A1c 6.5  Discussed importance of accu-checks and normoglycemic control in post operative state for wound healing and infection prevention she understood but wants to have general diet and stop checking sugars - completed   -Hold home metformin     3.  Hypertension continue home losartan      4.  Hypothyroidism continue home Synthroid         5.  Hyperlipidemia continue home Crestor      6.  Glaucoma continue home eyedrops     7.  History of breast cancer on anastrozole held for procedure continue to hold while inpatient resume when safe at discharge     8.  Obesity recommended diet exercise and weight loss     FN:  - IVF:Stop   - Diet:PIV     Prophylaxis:   DVT with 81 mg BID   Medically stable for discharge pending PT clearance for home vs [facility] dispo     Questions/concerns were discussed with patient and/or family by bedside.    Thank You,  Flaco Mendiola DO    Hospitalist with [facility]     Subjective:     Feels well, no issues mild pain at surgical site, no fevers or chills     OBJECTIVE:    Blood pressure  144/82, pulse 88, temperature 97.6 °F (36.4 °C), temperature source Temporal, resp. rate 17, height 5' 1\" (1.549 m), weight 194 lb (88 kg), SpO2 99%.    Temp:  [97.4 °F (36.3 °C)-99.5 °F (37.5 °C)] 97.6 °F (36.4 °C)  Pulse:  [] 88  Resp:  [16-17] 17  BP: (117-144)/(66-82) 144/82  SpO2:  [94 %-99 %] 99 %      Intake/Output:  No intake or output data in the 24 hours ending 05/13/24 1234      Last 3 Weights   05/10/24 1201 194 lb (88 kg)   05/07/24 1103 194 lb (88 kg)   08/04/23 0921 190 lb (86.2 kg)   07/31/23 1452 197 lb (89.4 kg)   01/27/22 1042 199 lb (90.3 kg)       Exam   Gen: No acute distress, alert and oriented x3  Pulm: Lungs clear, normal respiratory effort  CV: Heart with regular rate and rhythm  Abd: Abdomen soft, nontender, non-distended  MSK:left hip bandage CDI         Data Review:       Labs:     Recent Labs   Lab 05/11/24  0356   RBC 3.59*   HGB 10.6*   HCT 32.8*   MCV 91.4   MCH 29.5   MCHC 32.3   RDW 12.9   WBC 11.4*   .0         Recent Labs   Lab 05/11/24  0356   *   BUN 12   CREATSERUM 0.71   EGFRCR 94   CA 9.4      K 3.7      CO2 29.0       No results for input(s): \"ALT\", \"AST\", \"ALB\", \"AMYLASE\", \"LIPASE\", \"LDH\" in the last 168 hours.    Invalid input(s): \"ALPHOS\", \"TBIL\", \"DBIL\", \"TPROT\"      Imaging:  XR PELVIS (1 VIEW) (CPT=72170)    Result Date: 5/10/2024  CONCLUSION: Left hip arthroplasty.    Dictated by (CST): Shahriar Garcia MD on 5/10/2024 at 5:01 PM     Finalized by (CST): Shahriar Garcia MD on 5/10/2024 at 5:03 PM          XR FLUOROSCOPY C-ARM TIME LESS THAN 1 HOUR (CPT=76000)    Result Date: 5/10/2024  PROCEDURE: XR FLUORO PHYSICIAN TIME< 1 HOUR (CPT=76000)  COMPARISON: [facility], XR PELVIS (1 VIEW) (CPT=72170), 8/04/2023, 2:19 PM.  [facility], XR FLUOROSCOPY C-ARM  TIME< 1 HOUR (CPT=76000), 8/04/2023, 1:00 PM.  INDICATIONS: Left hip joint osteoarthritis.  TECHNIQUE: Intraoperative fluoroscopy was provided during performance of  a left total hip arthroplasty.   FLUOROSCOPY IMAGES OBTAINED:  1 (Dr. Omar Behery) FLUOROSCOPY TIME:  17.2 seconds RADIATION DOSE (Dose Area Product):  0.83299-[id number]*m^2    Dictated by (CST): Tapan Zuluaga MD on 5/10/2024 at 2:56 PM     Finalized by (CST): Tapan Zuluaga MD on 5/10/2024 at 2:57 PM             Meds:      aspirin  81 mg Oral BID    sennosides  17.2 mg Oral Nightly    docusate sodium  100 mg Oral BID    famotidine  20 mg Oral BID    Or    famotidine  20 mg Intravenous BID    acetaminophen  1,000 mg Oral q6h    traMADol  50 mg Oral 4 times per day    doxycycline  100 mg Oral 2 times per day    latanoprost  1 drop Both Eyes Nightly    levothyroxine  88 mcg Oral QAM    cetirizine  10 mg Oral Daily    losartan  50 mg Oral Daily    multivitamin with minerals  1 tablet Oral Daily    rosuvastatin  20 mg Oral Nightly    timolol  1 drop Both Eyes Daily    venlafaxine ER  150 mg Oral Daily         polyethylene glycol (PEG 3350)    magnesium hydroxide    bisacodyl    fleet enema    ondansetron    metoclopramide    diphenhydrAMINE **OR** diphenhydrAMINE    acetaminophen    acetaminophen **OR** HYDROcodone-acetaminophen **OR** HYDROcodone-acetaminophen

## 2024-05-13 NOTE — PLAN OF CARE
Patient Alert and Oriented x4. Cpap at night. Scheduled Tramadol and tylenol for pain. Standby with walker. Saline locked. Fall precautions in place. Call light and personal belongings within arms reach.   Problem: Patient Centered Care  Goal: Patient preferences are identified and integrated in the patient's plan of care  Description: Interventions:  - What would you like us to know as we care for you?   - Provide timely, complete, and accurate information to patient/family  - Incorporate patient and family knowledge, values, beliefs, and cultural backgrounds into the planning and delivery of care  - Encourage patient/family to participate in care and decision-making at the level they choose  - Honor patient and family perspectives and choices  Outcome: Progressing     Problem: RISK FOR INFECTION - ADULT  Goal: Absence of fever/infection during anticipated neutropenic period  Description: INTERVENTIONS  - Monitor WBC  - Administer growth factors as ordered  - Implement neutropenic guidelines  Outcome: Progressing

## 2024-05-13 NOTE — PHYSICAL THERAPY NOTE
PHYSICAL THERAPY TREATMENT NOTE - INPATIENT     Room Number: 416/416-A       Presenting Problem: L PRAFUL  Co-Morbidities : h/o R PRAFUL     Problem List  Active Problems:    * No active hospital problems. *      PHYSICAL THERAPY ASSESSMENT   Patient demonstrates good  progress this session, goals  remain in progress.    Patient continues to function below baseline with bed mobility, transfers, and gait.  Contributing factors to remaining limitations include decreased functional strength, decreased endurance/aerobic capacity, and pain.  Next session anticipate patient to progress bed mobility, transfers, and gait.  Physical Therapy will continue to follow patient for duration of hospitalization.    Patient continues to benefit from continued skilled PT services: to promote return to prior level of function and safety with continuous assistance and gradual rehabilitative therapy .    PLAN  PT Treatment Plan: Bed mobility;Body mechanics;Endurance;Patient education;Family education;Gait training  Frequency (Obs): Daily    SUBJECTIVE  Pt reports being ready for PT RX    OBJECTIVE  Precautions: Limb alert - left    WEIGHT BEARING RESTRICTION           L Lower Extremity: Weight Bearing as Tolerated    PAIN ASSESSMENT   Ratin  Location: denies this date       BALANCE  Static Sitting: Good  Dynamic Sitting: Good  Static Standing: Fair +  Dynamic Standing: Fair    ACTIVITY TOLERANCE                          O2 WALK       AM-PAC '6-Clicks' INPATIENT SHORT FORM - BASIC MOBILITY  How much difficulty does the patient currently have...  Patient Difficulty: Turning over in bed (including adjusting bedclothes, sheets and blankets)?: A Little   Patient Difficulty: Sitting down on and standing up from a chair with arms (e.g., wheelchair, bedside commode, etc.): A Little   Patient Difficulty: Moving from lying on back to sitting on the side of the bed?: A Little   How much help from another person does the patient currently need...    Help from Another: Moving to and from a bed to a chair (including a wheelchair)?: A Little   Help from Another: Need to walk in hospital room?: A Little   Help from Another: Climbing 3-5 steps with a railing?: A Little     AM-PAC Score:  Raw Score: 18   Approx Degree of Impairment: 46.58%   Standardized Score (AM-PAC Scale): 43.63   CMS Modifier (G-Code): CK    FUNCTIONAL ABILITY STATUS    Rolling: minimal assist  Supine to Sit: minimal assist  Sit to Supine: minimal assist  Sit to Stand: minimal assist    Additional information: Min a for bed mobility and transfer.Extra time provided to complete task.EOB sitting balance activity with emphasis on core stabilization.Pt educated on deep breathing and relaxation technique,Pt amb 2 x 50 ft with RW and min a;Unsteady gait;one rest break provided.Provided cuing for gait pattern as well as for postural awareness.There ex.    The patient's Approx Degree of Impairment: 46.58% has been calculated based on documentation in the [facility] '6 clicks' Inpatient Daily Activity Short Form.  Research supports that patients with this level of impairment may benefit from Sub -acutr  Rehabilitation.  Final disposition will be made by interdisciplinary medical team.    THERAPEUTIC EXERCISES  Lower Extremity Ankle pumps  Glut sets  Quad sets     Position Supine       Patient End of Session: Up in chair;Call light within reach;RN aware of session/findings;All patient questions and concerns addressed    CURRENT GOALS     Patient Goal Patient's self-stated goal is: to go to rehab   Goal #1 Patient is able to demonstrate supine - sit EOB @ level: independent     Goal #1   Current Status Min a   Goal #2 Patient is able to demonstrate transfers Sit to/from Stand at assistance level: supervision with walker - rolling     Goal #2  Current Status Min a   Goal #3 Patient is able to ambulate 500 feet with assist device: walker - rolling at assistance level: supervision   Goal #3   Current Status Pt  amb 2 x 50 ft with RW and min a;unsteady gait   Goal #4 Patient will negotiate 14 stairs/one curb w/ assistive device and supervision   Goal #4   Current Status NT   Goal #5 Patient to demonstrate independence with home activity/exercise instructions provided to patient in preparation for discharge.   Goal #5   Current Status In progress   Goal #6    Goal #6  Current Status      Gait Training:15  minutes  Therapeutic Activity: 15 minutes  Neuromuscular Re-education:  minutes  Therapeutic Exercise:  minutes  Canalith Repositioning:  minutes  Manual Therapy:  minutes  Can add/delete any of these

## 2024-05-14 PROCEDURE — 94799 UNLISTED PULMONARY SVC/PX: CPT

## 2024-05-14 PROCEDURE — 97530 THERAPEUTIC ACTIVITIES: CPT

## 2024-05-14 PROCEDURE — 97110 THERAPEUTIC EXERCISES: CPT

## 2024-05-14 NOTE — PLAN OF CARE
Patient is alert and oriented. On RA. Tele in place. SCDs on for DVT prophylaxis. Voiding freely. Up x1 with walker. Scheduled tylenol and tramadol given for pain management. Dressing in place to left hip, CDI. Call light within reach, bed alarm active.    Problem: Patient Centered Care  Goal: Patient preferences are identified and integrated in the patient's plan of care  Description: Interventions:  - Provide timely, complete, and accurate information to patient/family  - Incorporate patient and family knowledge, values, beliefs, and cultural backgrounds into the planning and delivery of care  - Encourage patient/family to participate in care and decision-making at the level they choose  - Honor patient and family perspectives and choices  Outcome: Progressing     Problem: Diabetes/Glucose Control  Goal: Glucose maintained within prescribed range  Description: INTERVENTIONS:  - Monitor Blood Glucose as ordered  - Assess for signs and symptoms of hyperglycemia and hypoglycemia  - Administer ordered medications to maintain glucose within target range  - Assess barriers to adequate nutritional intake and initiate nutrition consult as needed  - Instruct patient on self management of diabetes  Outcome: Progressing     Problem: PAIN - ADULT  Goal: Verbalizes/displays adequate comfort level or patient's stated pain goal  Description: INTERVENTIONS:  - Encourage pt to monitor pain and request assistance  - Assess pain using appropriate pain scale  - Administer analgesics based on type and severity of pain and evaluate response  - Implement non-pharmacological measures as appropriate and evaluate response  - Consider cultural and social influences on pain and pain management  - Manage/alleviate anxiety  - Utilize distraction and/or relaxation techniques  - Monitor for opioid side effects  - Notify MD/LIP if interventions unsuccessful or patient reports new pain  - Anticipate increased pain with activity and pre-medicate as  appropriate  Outcome: Progressing     Problem: RISK FOR INFECTION - ADULT  Goal: Absence of fever/infection during anticipated neutropenic period  Description: INTERVENTIONS  - Monitor WBC  - Administer growth factors as ordered  - Implement neutropenic guidelines  Outcome: Progressing     Problem: SAFETY ADULT - FALL  Goal: Free from fall injury  Description: INTERVENTIONS:  - Assess pt frequently for physical needs  - Identify cognitive and physical deficits and behaviors that affect risk of falls.  - [place] fall precautions as indicated by assessment.  - Educate pt/family on patient safety including physical limitations  - Instruct pt to call for assistance with activity based on assessment  - Modify environment to reduce risk of injury  - Provide assistive devices as appropriate  - Consider OT/PT consult to assist with strengthening/mobility  - Encourage toileting schedule  Outcome: Progressing     Problem: DISCHARGE PLANNING  Goal: Discharge to home or other facility with appropriate resources  Description: INTERVENTIONS:  - Identify barriers to discharge w/pt and caregiver  - Include patient/family/discharge partner in discharge planning  - Arrange for needed discharge resources and transportation as appropriate  - Identify discharge learning needs (meds, wound care, etc)  - Arrange for interpreters to assist at discharge as needed  - Consider post-discharge preferences of patient/family/discharge partner  - Complete POLST form as appropriate  - Assess patient's ability to be responsible for managing their own health  - Refer to Case Management Department for coordinating discharge planning if the patient needs post-hospital services based on physician/LIP order or complex needs related to functional status, cognitive ability or social support system  Outcome: Progressing

## 2024-05-14 NOTE — PHYSICAL THERAPY NOTE
PHYSICAL THERAPY TREATMENT NOTE - INPATIENT     Room Number: 416/416-A       Presenting Problem: L PRAFUL  Co-Morbidities : h/o R PRAFUL     Problem List  Active Problems:    * No active hospital problems. *      PHYSICAL THERAPY ASSESSMENT   Patient demonstrates good  progress this session, goals  remain in progress.    Patient continues to function below baseline with bed mobility, transfers, and gait.  Contributing factors to remaining limitations include decreased functional strength, decreased endurance/aerobic capacity, and pain.  Next session anticipate patient to progress bed mobility, transfers, and gait.  Physical Therapy will continue to follow patient for duration of hospitalization.    Patient continues to benefit from continued skilled PT services: to facilitate return to prior level of function as patient demonstrates high motivation with excellent tolerance to an intensive therapy program .    PLAN  PT Treatment Plan: Bed mobility;Body mechanics;Endurance;Gait training  Frequency (Obs): Daily    SUBJECTIVE  Pt reports being ready for PT RX    OBJECTIVE  Precautions: Limb alert - left    WEIGHT BEARING RESTRICTION           L Lower Extremity: Weight Bearing as Tolerated    PAIN ASSESSMENT   Ratin  Location: denies this date       BALANCE  Static Sitting: Good  Dynamic Sitting: Good  Static Standing: Fair +  Dynamic Standing: Fair    ACTIVITY TOLERANCE                          O2 WALK       AM-PAC '6-Clicks' INPATIENT SHORT FORM - BASIC MOBILITY  How much difficulty does the patient currently have...  Patient Difficulty: Turning over in bed (including adjusting bedclothes, sheets and blankets)?: A Little   Patient Difficulty: Sitting down on and standing up from a chair with arms (e.g., wheelchair, bedside commode, etc.): A Little   Patient Difficulty: Moving from lying on back to sitting on the side of the bed?: A Little   How much help from another person does the patient currently need...   Help  from Another: Moving to and from a bed to a chair (including a wheelchair)?: A Little   Help from Another: Need to walk in hospital room?: A Little   Help from Another: Climbing 3-5 steps with a railing?: A Little     AM-PAC Score:  Raw Score: 18   Approx Degree of Impairment: 46.58%   Standardized Score (AM-PAC Scale): 43.63   CMS Modifier (G-Code): CK    FUNCTIONAL ABILITY STATUS  Functional Mobility/Gait Assessment  Gait Assistance: Minimum assistance  Distance (ft): 2 x 45  Assistive Device: Rolling walker  Pattern: L Decreased stance time  Stairs:  (pt refusing trial this session)  Rolling: minimal assist  Supine to Sit: minimal assist  Sit to Supine: minimal assist  Sit to Stand: minimal assist    Additional information: Pt seen daily.Chart reviewed;education.Min a for bed mobility and transfer.Extra time provided to complete task.EOB sitting balance activity performed to maximize safety with functional mobility.Spinal precautions reviewed;education.Pt amb 2 x 45 ft with RW and min a;one rest break provided.Gentle balance activity performed to maximize safety with functional mobility.There ex.    The patient's Approx Degree of Impairment: 46.58% has been calculated based on documentation in the [facility] '6 clicks' Inpatient Daily Activity Short Form.  Research supports that patients with this level of impairment may benefit from Acute Rehabilitation.  Final disposition will be made by interdisciplinary medical team.    THERAPEUTIC EXERCISES  Lower Extremity Ankle pumps  Glut sets  Hip AB/AD  Heel slides  Quad sets     Position Supine       Patient End of Session: Up in chair;Call light within reach;RN aware of session/findings;All patient questions and concerns addressed    CURRENT GOALS   Patient Goal Patient's self-stated goal is: to go to rehab   Goal #1 Patient is able to demonstrate supine - sit EOB @ level: independent     Goal #1   Current Status Min a   Goal #2 Patient is able to demonstrate transfers Sit  to/from Stand at assistance level: supervision with walker - rolling     Goal #2  Current Status Min a   Goal #3 Patient is able to ambulate 500 feet with assist device: walker - rolling at assistance level: supervision   Goal #3   Current Status Pt amb 2 x 45 ft with RW and min a;unsteady gait   Goal #4 Patient will negotiate 14 stairs/one curb w/ assistive device and supervision   Goal #4   Current Status NT   Goal #5 Patient to demonstrate independence with home activity/exercise instructions provided to patient in preparation for discharge.   Goal #5   Current Status In progress   Goal #6    Goal #6  Current Status      Gait Training:  minutes  Therapeutic Activity: 15 minutes  Neuromuscular Re-education:  minutes  Therapeutic Exercise: 15 minutes  [place] Repositioning:  minutes  Manual Therapy:  minutes  Can add/delete any of these

## 2024-05-14 NOTE — PROGRESS NOTES
[facility] Hospitalist Progress Note     CC: Hospital Follow up    PCP: Ellie North DO       Assessment/Plan:     Active Problems:    * No active hospital problems. *    Patient is a 63 year old female with PMH breast cancer, HLD, HTN, OA, MDD, glaucoma, hypothyroidism who presents for elective left total hip arthroplasty, patient recently had right total hip arthroplasty without complications admitted to the hospital for observation medical service was consulted for postoperative medical management. Plans for [facility] on dc.  Awaiting insurance authorization.      Osteoarthritis status post left total hip arthroplasty  -Pain well controlled  -PT OT pending  -patient requests [facility]   -medically stable for discharge once location identified   -DVT proph as ordered  -Expected post op acute blood loss anemia - stable   -Post op doxy ordered  -ASA 81 mg BID     DM2 without complications   -A1c 6.5  -Dr. Mendiola Discussed importance of accu-checks and normoglycemic control in post operative state for wound healing and infection prevention she understood but wants to have general diet and stop checking sugars - completed   -Hold home metformin     Hypertension continue home losartan      Hypothyroidism continue home Synthroid      Hyperlipidemia continue home Crestor      Glaucoma continue home eyedrops     History of breast cancer on anastrozole held for procedure continue to hold while inpatient resume when safe at discharge     Obesity recommended diet exercise and weight loss     FN:  - IVF: complete   - Diet: general     Prophylaxis:   DVT with 81 mg BID   Medically stable for discharge pending PT clearance for home vs [facility] dispo     Questions/concerns were discussed with patient and/or family by bedside.    Thank You,  Blas Lawrence MD     Hospitalist with [facility]     Subjective:     Feels well, no issues mild pain at surgical site, no fevers or chills     OBJECTIVE:    Blood pressure 137/73, pulse 92,  temperature 98.3 °F (36.8 °C), temperature source Oral, resp. rate 16, height 5' 1\" (1.549 m), weight 194 lb (88 kg), SpO2 98%.    Temp:  [97.9 °F (36.6 °C)-98.5 °F (36.9 °C)] 98.3 °F (36.8 °C)  Pulse:  [92-98] 92  Resp:  [16-18] 16  BP: (125-139)/(66-78) 137/73  SpO2:  [94 %-98 %] 98 %      Intake/Output:    Intake/Output Summary (Last 24 hours) at 5/14/2024 1117  Last data filed at 5/13/2024 1520  Gross per 24 hour   Intake 400 ml   Output --   Net 400 ml         Last 3 Weights   05/10/24 1201 194 lb (88 kg)   05/07/24 1103 194 lb (88 kg)   08/04/23 0921 190 lb (86.2 kg)   07/31/23 1452 197 lb (89.4 kg)   01/27/22 1042 199 lb (90.3 kg)       Exam   Gen: No acute distress,   Neuo: alert and oriented x3  Pulm: Lungs clear, normal respiratory effort  CV: Heart with regular rate and rhythm  Abd: Abdomen soft, nontender, non-distended  MSK: left hip bandage CDI     Data Review:       Labs:     Recent Labs   Lab 05/11/24  0356   RBC 3.59*   HGB 10.6*   HCT 32.8*   MCV 91.4   MCH 29.5   MCHC 32.3   RDW 12.9   WBC 11.4*   .0         Recent Labs   Lab 05/11/24  0356   *   BUN 12   CREATSERUM 0.71   EGFRCR 94   CA 9.4      K 3.7      CO2 29.0       No results for input(s): \"ALT\", \"AST\", \"ALB\", \"AMYLASE\", \"LIPASE\", \"LDH\" in the last 168 hours.    Invalid input(s): \"ALPHOS\", \"TBIL\", \"DBIL\", \"TPROT\"      Imaging:  No results found.      Meds:      aspirin  81 mg Oral BID    sennosides  17.2 mg Oral Nightly    docusate sodium  100 mg Oral BID    famotidine  20 mg Oral BID    Or    famotidine  20 mg Intravenous BID    acetaminophen  1,000 mg Oral q6h    traMADol  50 mg Oral 4 times per day    doxycycline  100 mg Oral 2 times per day    latanoprost  1 drop Both Eyes Nightly    levothyroxine  88 mcg Oral QAM    cetirizine  10 mg Oral Daily    losartan  50 mg Oral Daily    multivitamin with minerals  1 tablet Oral Daily    rosuvastatin  20 mg Oral Nightly    timolol  1 drop Both Eyes Daily    venlafaxine  ER  150 mg Oral Daily         polyethylene glycol (PEG 3350)    magnesium hydroxide    bisacodyl    fleet enema    ondansetron    metoclopramide    diphenhydrAMINE **OR** diphenhydrAMINE    acetaminophen    acetaminophen **OR** HYDROcodone-acetaminophen **OR** HYDROcodone-acetaminophen

## 2024-05-15 PROCEDURE — 97530 THERAPEUTIC ACTIVITIES: CPT

## 2024-05-15 PROCEDURE — 94799 UNLISTED PULMONARY SVC/PX: CPT

## 2024-05-15 PROCEDURE — 97116 GAIT TRAINING THERAPY: CPT

## 2024-05-15 RX ORDER — TRAMADOL HYDROCHLORIDE 50 MG/1
50 TABLET ORAL EVERY 8 HOURS PRN
Qty: 20 TABLET | Refills: 0 | Status: SHIPPED | OUTPATIENT
Start: 2024-05-15

## 2024-05-15 RX ORDER — ASPIRIN 81 MG/1
81 TABLET ORAL 2 TIMES DAILY
Qty: 60 TABLET | Refills: 0 | Status: SHIPPED | OUTPATIENT
Start: 2024-05-15

## 2024-05-15 NOTE — PLAN OF CARE
Post op day 5. Pain managed with tylenol and tramadol. Up with 1 assist and walker. Awaiting insurance authorization for transfer to [facility].     Problem: Patient Centered Care  Goal: Patient preferences are identified and integrated in the patient's plan of care  Description: Interventions:  - What would you like us to know as we care for you?  - Provide timely, complete, and accurate information to patient/family  - Incorporate patient and family knowledge, values, beliefs, and cultural backgrounds into the planning and delivery of care  - Encourage patient/family to participate in care and decision-making at the level they choose  - Honor patient and family perspectives and choices  Outcome: Progressing     Problem: Diabetes/Glucose Control  Goal: Glucose maintained within prescribed range  Description: INTERVENTIONS:  - Monitor Blood Glucose as ordered  - Assess for signs and symptoms of hyperglycemia and hypoglycemia  - Administer ordered medications to maintain glucose within target range  - Assess barriers to adequate nutritional intake and initiate nutrition consult as needed  - Instruct patient on self management of diabetes  Outcome: Progressing     Problem: PAIN - ADULT  Goal: Verbalizes/displays adequate comfort level or patient's stated pain goal  Description: INTERVENTIONS:  - Encourage pt to monitor pain and request assistance  - Assess pain using appropriate pain scale  - Administer analgesics based on type and severity of pain and evaluate response  - Implement non-pharmacological measures as appropriate and evaluate response  - Consider cultural and social influences on pain and pain management  - Manage/alleviate anxiety  - Utilize distraction and/or relaxation techniques  - Monitor for opioid side effects  - Notify MD/LIP if interventions unsuccessful or patient reports new pain  - Anticipate increased pain with activity and pre-medicate as appropriate  Outcome: Progressing     Problem:  RISK FOR INFECTION - ADULT  Goal: Absence of fever/infection during anticipated neutropenic period  Description: INTERVENTIONS  - Monitor WBC  - Administer growth factors as ordered  - Implement neutropenic guidelines  Outcome: Progressing     Problem: SAFETY ADULT - FALL  Goal: Free from fall injury  Description: INTERVENTIONS:  - Assess pt frequently for physical needs  - Identify cognitive and physical deficits and behaviors that affect risk of falls.  - [place] fall precautions as indicated by assessment.  - Educate pt/family on patient safety including physical limitations  - Instruct pt to call for assistance with activity based on assessment  - Modify environment to reduce risk of injury  - Provide assistive devices as appropriate  - Consider OT/PT consult to assist with strengthening/mobility  - Encourage toileting schedule  Outcome: Progressing     Problem: DISCHARGE PLANNING  Goal: Discharge to home or other facility with appropriate resources  Description: INTERVENTIONS:  - Identify barriers to discharge w/pt and caregiver  - Include patient/family/discharge partner in discharge planning  - Arrange for needed discharge resources and transportation as appropriate  - Identify discharge learning needs (meds, wound care, etc)  - Arrange for interpreters to assist at discharge as needed  - Consider post-discharge preferences of patient/family/discharge partner  - Complete POLST form as appropriate  - Assess patient's ability to be responsible for managing their own health  - Refer to Case Management Department for coordinating discharge planning if the patient needs post-hospital services based on physician/LIP order or complex needs related to functional status, cognitive ability or social support system  Outcome: Progressing

## 2024-05-15 NOTE — PLAN OF CARE
Patient is alert and oriented. On RA.  CPAP HS. Tele in place. SCDs on for DVT prophylaxis. Voiding freely.. Up x1 with walker. scheduled tylenol and tramadol given for pain management. Dressing in place to left hip, CDI. Call light within reach, bed alarm active.    Problem: Patient Centered Care  Goal: Patient preferences are identified and integrated in the patient's plan of care  Description: Interventions:  - Provide timely, complete, and accurate information to patient/family  - Incorporate patient and family knowledge, values, beliefs, and cultural backgrounds into the planning and delivery of care  - Encourage patient/family to participate in care and decision-making at the level they choose  - Honor patient and family perspectives and choices  Outcome: Progressing     Problem: Diabetes/Glucose Control  Goal: Glucose maintained within prescribed range  Description: INTERVENTIONS:  - Monitor Blood Glucose as ordered  - Assess for signs and symptoms of hyperglycemia and hypoglycemia  - Administer ordered medications to maintain glucose within target range  - Assess barriers to adequate nutritional intake and initiate nutrition consult as needed  - Instruct patient on self management of diabetes  Outcome: Progressing     Problem: PAIN - ADULT  Goal: Verbalizes/displays adequate comfort level or patient's stated pain goal  Description: INTERVENTIONS:  - Encourage pt to monitor pain and request assistance  - Assess pain using appropriate pain scale  - Administer analgesics based on type and severity of pain and evaluate response  - Implement non-pharmacological measures as appropriate and evaluate response  - Consider cultural and social influences on pain and pain management  - Manage/alleviate anxiety  - Utilize distraction and/or relaxation techniques  - Monitor for opioid side effects  - Notify MD/LIP if interventions unsuccessful or patient reports new pain  - Anticipate increased pain with activity and  pre-medicate as appropriate  Outcome: Progressing     Problem: RISK FOR INFECTION - ADULT  Goal: Absence of fever/infection during anticipated neutropenic period  Description: INTERVENTIONS  - Monitor WBC  - Administer growth factors as ordered  - Implement neutropenic guidelines  Outcome: Progressing     Problem: SAFETY ADULT - FALL  Goal: Free from fall injury  Description: INTERVENTIONS:  - Assess pt frequently for physical needs  - Identify cognitive and physical deficits and behaviors that affect risk of falls.  - [place] fall precautions as indicated by assessment.  - Educate pt/family on patient safety including physical limitations  - Instruct pt to call for assistance with activity based on assessment  - Modify environment to reduce risk of injury  - Provide assistive devices as appropriate  - Consider OT/PT consult to assist with strengthening/mobility  - Encourage toileting schedule  Outcome: Progressing     Problem: DISCHARGE PLANNING  Goal: Discharge to home or other facility with appropriate resources  Description: INTERVENTIONS:  - Identify barriers to discharge w/pt and caregiver  - Include patient/family/discharge partner in discharge planning  - Arrange for needed discharge resources and transportation as appropriate  - Identify discharge learning needs (meds, wound care, etc)  - Arrange for interpreters to assist at discharge as needed  - Consider post-discharge preferences of patient/family/discharge partner  - Complete POLST form as appropriate  - Assess patient's ability to be responsible for managing their own health  - Refer to Case Management Department for coordinating discharge planning if the patient needs post-hospital services based on physician/LIP order or complex needs related to functional status, cognitive ability or social support system  Outcome: Progressing

## 2024-05-15 NOTE — PHYSICAL THERAPY NOTE
PHYSICAL THERAPY TREATMENT NOTE - INPATIENT     Room Number: 416/416-A       Presenting Problem: L PRAFUL  Co-Morbidities : h/o R PRAFUL     Problem List  Active Problems:    * No active hospital problems. *      PHYSICAL THERAPY ASSESSMENT   Patient demonstrates good  progress this session, goals  remain in progress.    Patient continues to function below baseline with bed mobility, transfers, gait, and performing household tasks, requiring Minimum Assistance.  Contributing factors to remaining limitations include decreased functional strength, pain, impaired dynamic balance, decreased muscular endurance, and limited L hip ROM.  Next session anticipate patient to progress bed mobility, transfers, and gait.  Physical Therapy will continue to follow patient for duration of hospitalization.    Patient continues to benefit from continued skilled PT services: at discharge to promote functional independence and safety with additional support and return home with home health PT.    PLAN  PT Treatment Plan: Bed mobility;Body mechanics;Endurance;Gait training  Frequency (Obs): Daily    SUBJECTIVE  Agreeable to therapy     OBJECTIVE  Precautions: Limb alert - left    WEIGHT BEARING RESTRICTION           L Lower Extremity: Weight Bearing as Tolerated    PAIN ASSESSMENT   Ratin (5/10 with activity)  Location: L hip  Management Techniques: Activity promotion;Body mechanics;Breathing techniques    BALANCE  Static Sitting: Good  Dynamic Sitting: Good  Static Standing: Fair +  Dynamic Standing: Fair        AM-PAC '6-Clicks' INPATIENT SHORT FORM - BASIC MOBILITY  How much difficulty does the patient currently have...  Patient Difficulty: Turning over in bed (including adjusting bedclothes, sheets and blankets)?: A Little   Patient Difficulty: Sitting down on and standing up from a chair with arms (e.g., wheelchair, bedside commode, etc.): A Little   Patient Difficulty: Moving from lying on back to sitting on the side of the bed?:  A Little   How much help from another person does the patient currently need...   Help from Another: Moving to and from a bed to a chair (including a wheelchair)?: A Little   Help from Another: Need to walk in hospital room?: A Little   Help from Another: Climbing 3-5 steps with a railing?: A Little     AM-PAC Score:  Raw Score: 18   Approx Degree of Impairment: 46.58%   Standardized Score (AM-PAC Scale): 43.63   CMS Modifier (G-Code): CK    FUNCTIONAL ABILITY STATUS  Functional Mobility/Gait Assessment  Gait Assistance: Contact guard assist  Distance (ft): 75 ft x2  Assistive Device: Rolling walker  Pattern: L Decreased stance time (decreased step length, slow pace, rest break required 2/2 fatigue)  Stairs:  (pt refusing trial this session)  Supine to Sit: minimal assist for LLE  Sit to Supine: minimal assist for LLE  Sit to Stand: contact guard assist with RW    Skilled Therapy Provided: Pt agreeable to therapy, identified by name and , gait belt donned for mobility. Pt with most difficulty with bed mobility this session, requiring Min A for LLE management. Pain increases with bed mobility but able to tolerate. Pt with gait progression, but only able to ambulate 75 ft x2 with rest break required 2/2 fatigue. Pt with 14 stairs at home, states she is concerned about safety if discharged home due to pain and weakness in surgical leg. Pt with impaired dynamic balance and decreased endurance, pt at risk for falls if discharged home alone. Pt in bed with needs in reach, RN aware of pt status.     The patient's Approx Degree of Impairment: 46.58% has been calculated based on documentation in the [facility] '6 clicks' Inpatient Daily Activity Short Form.  Research supports that patients with this level of impairment may benefit from HHPT.  Final disposition will be made by interdisciplinary medical team.      Patient End of Session: In bed;Needs met;RN aware of session/findings;Call light within reach    CURRENT GOALS    Patient Goal Patient's self-stated goal is: to go to rehab   Goal #1 Patient is able to demonstrate supine - sit EOB @ level: independent      Goal #1   Current Status Min a   Goal #2 Patient is able to demonstrate transfers Sit to/from Stand at assistance level: supervision with walker - rolling      Goal #2  Current Status CGA with RW   Goal #3 Patient is able to ambulate 500 feet with assist device: walker - rolling at assistance level: supervision   Goal #3   Current Status 75 ft x2 with RW, CGA   Goal #4 Patient will negotiate 14 stairs/one curb w/ assistive device and supervision   Goal #4   Current Status NT   Goal #5 Patient to demonstrate independence with home activity/exercise instructions provided to patient in preparation for discharge.   Goal #5   Current Status In progress   Goal #6     Goal #6  Current Status       Gait Training: 15 minutes  Therapeutic Activity: 8 minutes

## 2024-05-15 NOTE — CM/SW NOTE
Pt is ready for jass, MD regan order entered. Liaison Carmen at [facility] notified CM that ins auth is pending for SLADE. Carmen agreeable to notify CM once ins auth has been obtained.     1630: Ins auth still pending per liareanna Morales at [facility].    Plan: [facility] for SLADE pending ins auth.    [place] Albarran    [phone]

## 2024-05-15 NOTE — PLAN OF CARE
Sharifa is pod 4 Left THR-anterior approach. Cms wnl. Carmen diet-bm today, oob sba with RW-safety intact, scds/asas bid, edema present to b/l maci, tylenol/ultram atc, awaiting ins auth for [facility]  Problem: Patient Centered Care  Goal: Patient preferences are identified and integrated in the patient's plan of care  Description: Interventions:  - What would you like us to know as we care for you? Lives alone-older home with a lot of stairs-prefer to go to rehab  - Provide timely, complete, and accurate information to patient/family  - Incorporate patient and family knowledge, values, beliefs, and cultural backgrounds into the planning and delivery of care  - Encourage patient/family to participate in care and decision-making at the level they choose  - Honor patient and family perspectives and choices  Outcome: Progressing     Problem: Diabetes/Glucose Control  Goal: Glucose maintained within prescribed range  Description: INTERVENTIONS:  - Monitor Blood Glucose as ordered  - Assess for signs and symptoms of hyperglycemia and hypoglycemia  - Administer ordered medications to maintain glucose within target range  - Assess barriers to adequate nutritional intake and initiate nutrition consult as needed  - Instruct patient on self management of diabetes  Outcome: Progressing     Problem: PAIN - ADULT  Goal: Verbalizes/displays adequate comfort level or patient's stated pain goal  Description: INTERVENTIONS:  - Encourage pt to monitor pain and request assistance  - Assess pain using appropriate pain scale  - Administer analgesics based on type and severity of pain and evaluate response  - Implement non-pharmacological measures as appropriate and evaluate response  - Consider cultural and social influences on pain and pain management  - Manage/alleviate anxiety  - Utilize distraction and/or relaxation techniques  - Monitor for opioid side effects  - Notify MD/LIP if interventions unsuccessful or patient reports new  pain  - Anticipate increased pain with activity and pre-medicate as appropriate  Outcome: Progressing     Problem: RISK FOR INFECTION - ADULT  Goal: Absence of fever/infection during anticipated neutropenic period  Description: INTERVENTIONS  - Monitor WBC  - Administer growth factors as ordered  - Implement neutropenic guidelines  Outcome: Progressing     Problem: SAFETY ADULT - FALL  Goal: Free from fall injury  Description: INTERVENTIONS:  - Assess pt frequently for physical needs  - Identify cognitive and physical deficits and behaviors that affect risk of falls.  - [place] fall precautions as indicated by assessment.  - Educate pt/family on patient safety including physical limitations  - Instruct pt to call for assistance with activity based on assessment  - Modify environment to reduce risk of injury  - Provide assistive devices as appropriate  - Consider OT/PT consult to assist with strengthening/mobility  - Encourage toileting schedule  Outcome: Progressing     Problem: DISCHARGE PLANNING  Goal: Discharge to home or other facility with appropriate resources  Description: INTERVENTIONS:  - Identify barriers to discharge w/pt and caregiver  - Include patient/family/discharge partner in discharge planning  - Arrange for needed discharge resources and transportation as appropriate  - Identify discharge learning needs (meds, wound care, etc)  - Arrange for interpreters to assist at discharge as needed  - Consider post-discharge preferences of patient/family/discharge partner  - Complete POLST form as appropriate  - Assess patient's ability to be responsible for managing their own health  - Refer to Case Management Department for coordinating discharge planning if the patient needs post-hospital services based on physician/LIP order or complex needs related to functional status, cognitive ability or social support system  Outcome: Progressing

## 2024-05-15 NOTE — PROGRESS NOTES
[facility] Hospitalist Progress Note     CC: Hospital Follow up    PCP: Ellie North DO       Assessment/Plan:     Active Problems:    * No active hospital problems. *    Patient is a 63 year old female with PMH breast cancer, HLD, HTN, OA, MDD, glaucoma, hypothyroidism who presents for elective left total hip arthroplasty, patient recently had right total hip arthroplasty without complications admitted to the hospital for observation medical service was consulted for postoperative medical management. Plans for SLADE on dc.  Awaiting insurance authorization.      Osteoarthritis status post left total hip arthroplasty  -Pain well controlled  -PT OT pending  -patient requests SLADE   -medically stable for discharge once location identified   -DVT proph as ordered  -Expected post op acute blood loss anemia - stable   -Post op doxy ordered  -ASA 81 mg BID   -doxy day 5 of 7     DM2 without complications   -A1c 6.5  -Dr. Mendiola Discussed importance of accu-checks and normoglycemic control in post operative state for wound healing and infection prevention she understood but wants to have general diet and stop checking sugars - completed   -Hold home metformin     Hypertension continue home losartan      Hypothyroidism continue home Synthroid      Hyperlipidemia continue home Crestor      Glaucoma continue home eyedrops     History of breast cancer on anastrozole held for procedure continue to hold while inpatient resume when safe at discharge     Obesity recommended diet exercise and weight loss     FN:  - IVF: complete   - Diet: general     Prophylaxis:   DVT with 81 mg BID   Medically stable for discharge  Insurance Auth pending     Questions/concerns were discussed with patient and/or family by bedside.    Thank You,  Blas Lawrence MD     Hospitalist with [facility]     Subjective:     Feels well  Pain well controlled  no fevers or chills   Had some redness in left lower leg which is now better      OBJECTIVE:    Blood pressure 146/72, pulse 92, temperature 97.8 °F (36.6 °C), temperature source Oral, resp. rate 18, height 5' 1\" (1.549 m), weight 194 lb (88 kg), SpO2 95%.    Temp:  [97.8 °F (36.6 °C)-98.8 °F (37.1 °C)] 97.8 °F (36.6 °C)  Pulse:  [88-96] 92  Resp:  [16-20] 18  BP: (127-149)/(72-74) 146/72  SpO2:  [95 %-98 %] 95 %      Intake/Output:  No intake or output data in the 24 hours ending 05/15/24 1136        Last 3 Weights   05/10/24 1201 194 lb (88 kg)   05/07/24 1103 194 lb (88 kg)   08/04/23 0921 190 lb (86.2 kg)   07/31/23 1452 197 lb (89.4 kg)   01/27/22 1042 199 lb (90.3 kg)       Exam   Gen: No acute distress,   Neuo: alert and oriented x3  Pulm: Lungs clear, normal respiratory effort  CV: Heart with regular rate and rhythm  Abd: Abdomen soft, nontender, non-distended  MSK: left hip bandage CDI     Data Review:       Labs:     Recent Labs   Lab 05/11/24  0356   RBC 3.59*   HGB 10.6*   HCT 32.8*   MCV 91.4   MCH 29.5   MCHC 32.3   RDW 12.9   WBC 11.4*   .0         Recent Labs   Lab 05/11/24  0356   *   BUN 12   CREATSERUM 0.71   EGFRCR 94   CA 9.4      K 3.7      CO2 29.0       No results for input(s): \"ALT\", \"AST\", \"ALB\", \"AMYLASE\", \"LIPASE\", \"LDH\" in the last 168 hours.    Invalid input(s): \"ALPHOS\", \"TBIL\", \"DBIL\", \"TPROT\"      Imaging:  No results found.      Meds:      aspirin  81 mg Oral BID    sennosides  17.2 mg Oral Nightly    docusate sodium  100 mg Oral BID    famotidine  20 mg Oral BID    Or    famotidine  20 mg Intravenous BID    acetaminophen  1,000 mg Oral q6h    traMADol  50 mg Oral 4 times per day    doxycycline  100 mg Oral 2 times per day    latanoprost  1 drop Both Eyes Nightly    levothyroxine  88 mcg Oral QAM    cetirizine  10 mg Oral Daily    losartan  50 mg Oral Daily    multivitamin with minerals  1 tablet Oral Daily    rosuvastatin  20 mg Oral Nightly    timolol  1 drop Both Eyes Daily    venlafaxine ER  150 mg Oral Daily         polyethylene  glycol (PEG 3350)    magnesium hydroxide    bisacodyl    fleet enema    ondansetron    metoclopramide    diphenhydrAMINE **OR** diphenhydrAMINE    acetaminophen    acetaminophen **OR** HYDROcodone-acetaminophen **OR** HYDROcodone-acetaminophen

## 2024-05-16 PROCEDURE — 94799 UNLISTED PULMONARY SVC/PX: CPT

## 2024-05-16 PROCEDURE — 97530 THERAPEUTIC ACTIVITIES: CPT

## 2024-05-16 PROCEDURE — 97116 GAIT TRAINING THERAPY: CPT

## 2024-05-16 NOTE — PROGRESS NOTES
[facility] Hospitalist Progress Note     CC: Hospital Follow up    PCP: Ellie North DO       Assessment/Plan:     Active Problems:    * No active hospital problems. *    Patient is a 63 year old female with PMH breast cancer, HLD, HTN, OA, MDD, glaucoma, hypothyroidism who presents for elective left total hip arthroplasty, patient recently had right total hip arthroplasty without complications admitted to the hospital for observation medical service was consulted for postoperative medical management. Plans for SLADE on dc.  Awaiting insurance authorization.      Osteoarthritis status post left total hip arthroplasty  -Pain well controlled  -PT OT pending  -patient requests SLADE   -medically stable for discharge once location identified   -DVT proph as ordered  -Expected post op acute blood loss anemia - stable   -Post op doxy ordered  -ASA 81 mg BID   -doxy day 6 of 7     DM2 without complications   -A1c 6.5  -Dr. Mendiola Discussed importance of accu-checks and normoglycemic control in post operative state for wound healing and infection prevention she understood but wants to have general diet and stop checking sugars -completed   -Hold home metformin, resume on dc      Hypertension continue home losartan      Hypothyroidism continue home Synthroid      Hyperlipidemia continue home Crestor      Glaucoma continue home eyedrops     History of breast cancer on anastrozole held for procedure continue to hold while inpatient resume when safe at discharge     Obesity recommended diet exercise and weight loss     FN:  - IVF: complete   - Diet: general     Prophylaxis:   DVT with 81 mg BID   Medically stable for discharge  Insurance Auth pending     Questions/concerns were discussed with patient and/or family by bedside.    Thank You,  Blas Lawrence MD     Hospitalist with [facility]     Subjective:     Feels well  Pain well controlled  Wants to ambulate more     OBJECTIVE:    Blood pressure 145/84, pulse 88,  temperature 97.4 °F (36.3 °C), temperature source Temporal, resp. rate 16, height 5' 1\" (1.549 m), weight 194 lb (88 kg), SpO2 99%.    Temp:  [97.4 °F (36.3 °C)-97.9 °F (36.6 °C)] 97.4 °F (36.3 °C)  Pulse:  [82-88] 88  Resp:  [16-18] 16  BP: (130-145)/(72-84) 145/84  SpO2:  [97 %-99 %] 99 %      Intake/Output:    Intake/Output Summary (Last 24 hours) at 5/16/2024 1229  Last data filed at 5/16/2024 1000  Gross per 24 hour   Intake 760 ml   Output --   Net 760 ml           Last 3 Weights   05/10/24 1201 194 lb (88 kg)   05/07/24 1103 194 lb (88 kg)   08/04/23 0921 190 lb (86.2 kg)   07/31/23 1452 197 lb (89.4 kg)   01/27/22 1042 199 lb (90.3 kg)       Exam   Gen: No acute distress,   Neuo: alert and oriented x3  Pulm: Lungs clear, normal respiratory effort  CV: Heart with regular rate and rhythm  Abd: Abdomen soft, nontender, non-distended  MSK: left hip bandage CDI     Data Review:       Labs:     Recent Labs   Lab 05/11/24  0356   RBC 3.59*   HGB 10.6*   HCT 32.8*   MCV 91.4   MCH 29.5   MCHC 32.3   RDW 12.9   WBC 11.4*   .0         Recent Labs   Lab 05/11/24  0356   *   BUN 12   CREATSERUM 0.71   EGFRCR 94   CA 9.4      K 3.7      CO2 29.0       No results for input(s): \"ALT\", \"AST\", \"ALB\", \"AMYLASE\", \"LIPASE\", \"LDH\" in the last 168 hours.    Invalid input(s): \"ALPHOS\", \"TBIL\", \"DBIL\", \"TPROT\"      Imaging:  No results found.      Meds:      aspirin  81 mg Oral BID    sennosides  17.2 mg Oral Nightly    docusate sodium  100 mg Oral BID    famotidine  20 mg Oral BID    Or    famotidine  20 mg Intravenous BID    acetaminophen  1,000 mg Oral q6h    traMADol  50 mg Oral 4 times per day    doxycycline  100 mg Oral 2 times per day    latanoprost  1 drop Both Eyes Nightly    levothyroxine  88 mcg Oral QAM    cetirizine  10 mg Oral Daily    losartan  50 mg Oral Daily    multivitamin with minerals  1 tablet Oral Daily    rosuvastatin  20 mg Oral Nightly    timolol  1 drop Both Eyes Daily     venlafaxine ER  150 mg Oral Daily         polyethylene glycol (PEG 3350)    magnesium hydroxide    bisacodyl    fleet enema    ondansetron    metoclopramide    diphenhydrAMINE **OR** diphenhydrAMINE    acetaminophen    acetaminophen **OR** HYDROcodone-acetaminophen **OR** HYDROcodone-acetaminophen

## 2024-05-16 NOTE — PLAN OF CARE
Post op day 6. Pain managed with tylenol and tramadol. Up with 1 assist and walker. Awaiting insurance authorization for transfer to [facility].     Problem: Patient Centered Care  Goal: Patient preferences are identified and integrated in the patient's plan of care  Description: Interventions:  - What would you like us to know as we care for you?  - Provide timely, complete, and accurate information to patient/family  - Incorporate patient and family knowledge, values, beliefs, and cultural backgrounds into the planning and delivery of care  - Encourage patient/family to participate in care and decision-making at the level they choose  - Honor patient and family perspectives and choices  Outcome: Progressing     Problem: Diabetes/Glucose Control  Goal: Glucose maintained within prescribed range  Description: INTERVENTIONS:  - Monitor Blood Glucose as ordered  - Assess for signs and symptoms of hyperglycemia and hypoglycemia  - Administer ordered medications to maintain glucose within target range  - Assess barriers to adequate nutritional intake and initiate nutrition consult as needed  - Instruct patient on self management of diabetes  Outcome: Progressing     Problem: PAIN - ADULT  Goal: Verbalizes/displays adequate comfort level or patient's stated pain goal  Description: INTERVENTIONS:  - Encourage pt to monitor pain and request assistance  - Assess pain using appropriate pain scale  - Administer analgesics based on type and severity of pain and evaluate response  - Implement non-pharmacological measures as appropriate and evaluate response  - Consider cultural and social influences on pain and pain management  - Manage/alleviate anxiety  - Utilize distraction and/or relaxation techniques  - Monitor for opioid side effects  - Notify MD/LIP if interventions unsuccessful or patient reports new pain  - Anticipate increased pain with activity and pre-medicate as appropriate  Outcome: Progressing     Problem:  RISK FOR INFECTION - ADULT  Goal: Absence of fever/infection during anticipated neutropenic period  Description: INTERVENTIONS  - Monitor WBC  - Administer growth factors as ordered  - Implement neutropenic guidelines  Outcome: Progressing     Problem: SAFETY ADULT - FALL  Goal: Free from fall injury  Description: INTERVENTIONS:  - Assess pt frequently for physical needs  - Identify cognitive and physical deficits and behaviors that affect risk of falls.  - [place] fall precautions as indicated by assessment.  - Educate pt/family on patient safety including physical limitations  - Instruct pt to call for assistance with activity based on assessment  - Modify environment to reduce risk of injury  - Provide assistive devices as appropriate  - Consider OT/PT consult to assist with strengthening/mobility  - Encourage toileting schedule  Outcome: Progressing     Problem: DISCHARGE PLANNING  Goal: Discharge to home or other facility with appropriate resources  Description: INTERVENTIONS:  - Identify barriers to discharge w/pt and caregiver  - Include patient/family/discharge partner in discharge planning  - Arrange for needed discharge resources and transportation as appropriate  - Identify discharge learning needs (meds, wound care, etc)  - Arrange for interpreters to assist at discharge as needed  - Consider post-discharge preferences of patient/family/discharge partner  - Complete POLST form as appropriate  - Assess patient's ability to be responsible for managing their own health  - Refer to Case Management Department for coordinating discharge planning if the patient needs post-hospital services based on physician/LIP order or complex needs related to functional status, cognitive ability or social support system  Outcome: Progressing

## 2024-05-16 NOTE — PLAN OF CARE
Patient is alert and oriented. On RA. CPAP HS. Tele in place. SCDs on for DVT prophylaxis. Voiding freely. Up x1 with walker. Scheduled tylenol and tramadol for pain management. Dressing in place to left hip, CDI. Call light within reach, bed alarm active.    Problem: Patient Centered Care  Goal: Patient preferences are identified and integrated in the patient's plan of care  Description: Interventions:  - Provide timely, complete, and accurate information to patient/family  - Incorporate patient and family knowledge, values, beliefs, and cultural backgrounds into the planning and delivery of care  - Encourage patient/family to participate in care and decision-making at the level they choose  - Honor patient and family perspectives and choices  Outcome: Progressing     Problem: Diabetes/Glucose Control  Goal: Glucose maintained within prescribed range  Description: INTERVENTIONS:  - Monitor Blood Glucose as ordered  - Assess for signs and symptoms of hyperglycemia and hypoglycemia  - Administer ordered medications to maintain glucose within target range  - Assess barriers to adequate nutritional intake and initiate nutrition consult as needed  - Instruct patient on self management of diabetes  Outcome: Progressing     Problem: PAIN - ADULT  Goal: Verbalizes/displays adequate comfort level or patient's stated pain goal  Description: INTERVENTIONS:  - Encourage pt to monitor pain and request assistance  - Assess pain using appropriate pain scale  - Administer analgesics based on type and severity of pain and evaluate response  - Implement non-pharmacological measures as appropriate and evaluate response  - Consider cultural and social influences on pain and pain management  - Manage/alleviate anxiety  - Utilize distraction and/or relaxation techniques  - Monitor for opioid side effects  - Notify MD/LIP if interventions unsuccessful or patient reports new pain  - Anticipate increased pain with activity and pre-medicate  as appropriate  Outcome: Progressing     Problem: RISK FOR INFECTION - ADULT  Goal: Absence of fever/infection during anticipated neutropenic period  Description: INTERVENTIONS  - Monitor WBC  - Administer growth factors as ordered  - Implement neutropenic guidelines  Outcome: Progressing     Problem: SAFETY ADULT - FALL  Goal: Free from fall injury  Description: INTERVENTIONS:  - Assess pt frequently for physical needs  - Identify cognitive and physical deficits and behaviors that affect risk of falls.  - [place] fall precautions as indicated by assessment.  - Educate pt/family on patient safety including physical limitations  - Instruct pt to call for assistance with activity based on assessment  - Modify environment to reduce risk of injury  - Provide assistive devices as appropriate  - Consider OT/PT consult to assist with strengthening/mobility  - Encourage toileting schedule  Outcome: Progressing     Problem: DISCHARGE PLANNING  Goal: Discharge to home or other facility with appropriate resources  Description: INTERVENTIONS:  - Identify barriers to discharge w/pt and caregiver  - Include patient/family/discharge partner in discharge planning  - Arrange for needed discharge resources and transportation as appropriate  - Identify discharge learning needs (meds, wound care, etc)  - Arrange for interpreters to assist at discharge as needed  - Consider post-discharge preferences of patient/family/discharge partner  - Complete POLST form as appropriate  - Assess patient's ability to be responsible for managing their own health  - Refer to Case Management Department for coordinating discharge planning if the patient needs post-hospital services based on physician/LIP order or complex needs related to functional status, cognitive ability or social support system  Outcome: Progressing

## 2024-05-16 NOTE — CM/SW NOTE
YADIEL spoke with [facility] Doming[facility] regarding acceptin the pt. Pending insurance auth.   is unable to do so at this time and they are attempting to expedite the authorization.      SVETLANA Lara ext [phone]

## 2024-05-16 NOTE — PHYSICAL THERAPY NOTE
PHYSICAL THERAPY TREATMENT NOTE - INPATIENT     Room Number: 416/416-A       Presenting Problem: L PRAFUL  Co-Morbidities : h/o R PRAFUL '23    Problem List  Active Problems:    * No active hospital problems. *      PHYSICAL THERAPY ASSESSMENT   Patient demonstrates good  progress this session, goals  remain in progress.    Patient continues to function below baseline with bed mobility, transfers, gait, and stair negotiation.  Contributing factors to remaining limitations include decreased functional strength, pain, impaired dynamic balance, and limited Lhip ROM.  Next session anticipate patient to progress bed mobility, transfers, and gait.  Physical Therapy will continue to follow patient for duration of hospitalization.    Patient continues to benefit from continued skilled PT services: at discharge to promote functional independence and safety with additional support and return home with home health PT.    PLAN  PT Treatment Plan: Bed mobility;Body mechanics;Endurance;Gait training  Frequency (Obs): Daily    SUBJECTIVE  Agreeable to therapy     OBJECTIVE  Precautions: Limb alert - left    WEIGHT BEARING RESTRICTION           L Lower Extremity: Weight Bearing as Tolerated    PAIN ASSESSMENT   Rating:  (did not quantify)  Location: L hip  Management Techniques: Activity promotion    BALANCE  Static Sitting: Good  Dynamic Sitting: Good  Static Standing: Fair +  Dynamic Standing: Fair        AM-PAC '6-Clicks' INPATIENT SHORT FORM - BASIC MOBILITY  How much difficulty does the patient currently have...  Patient Difficulty: Turning over in bed (including adjusting bedclothes, sheets and blankets)?: A Little   Patient Difficulty: Sitting down on and standing up from a chair with arms (e.g., wheelchair, bedside commode, etc.): A Little   Patient Difficulty: Moving from lying on back to sitting on the side of the bed?: A Little   How much help from another person does the patient currently need...   Help from Another: Moving  to and from a bed to a chair (including a wheelchair)?: A Little   Help from Another: Need to walk in hospital room?: A Little   Help from Another: Climbing 3-5 steps with a railing?: A Little     AM-PAC Score:  Raw Score: 18   Approx Degree of Impairment: 46.58%   Standardized Score (AM-PAC Scale): 43.63   CMS Modifier (G-Code): CK    FUNCTIONAL ABILITY STATUS  Functional Mobility/Gait Assessment  Gait Assistance:  (SBA)  Distance (ft): 100 ft x2  Assistive Device: Rolling walker  Pattern: L Decreased stance time (decreased step length, slow pace, step through)  Stairs:  (pt refusing trial this session)  Sit to Stand: contact guard assist with RW    Additional information: Pt agreeable to therapy, identified by name and , gait belt donned for mobility. Skilled therapy provided: transfers and gait training with RW.     The patient's Approx Degree of Impairment: 46.58% has been calculated based on documentation in the [facility] '6 clicks' Inpatient Daily Activity Short Form.  Research supports that patients with this level of impairment may benefit from HHPT.  Final disposition will be made by interdisciplinary medical team.        Patient End of Session: Up in chair;Needs met;Call light within reach;RN aware of session/findings    CURRENT GOALS   Patient Goal Patient's self-stated goal is: to go to rehab   Goal #1 Patient is able to demonstrate supine - sit EOB @ level: independent      Goal #1   Current Status Min a   Goal #2 Patient is able to demonstrate transfers Sit to/from Stand at assistance level: supervision with walker - rolling      Goal #2  Current Status CGA with RW   Goal #3 Patient is able to ambulate 500 feet with assist device: walker - rolling at assistance level: supervision   Goal #3   Current Status SBA with  ftx2   Goal #4 Patient will negotiate 14 stairs/one curb w/ assistive device and supervision   Goal #4   Current Status NT   Goal #5 Patient to demonstrate independence with home  activity/exercise instructions provided to patient in preparation for discharge.   Goal #5   Current Status In progress   Goal #6     Goal #6  Current Status       Gait Training: 15 minutes       22-May-2018 00:05

## 2024-05-16 NOTE — CM/SW NOTE
Ins auth for [facility] is still pending    / to remain available for support and/or discharge planning.     Nita Mccain RN    Ext [phone]

## 2024-05-16 NOTE — OCCUPATIONAL THERAPY NOTE
OCCUPATIONAL THERAPY TREATMENT NOTE - INPATIENT    Room Number: 416/416-A  Presenting Problem: L PRAFUL     Problem List  Active Problems:    * No active hospital problems. *      OCCUPATIONAL THERAPY ASSESSMENT   Patient demonstrates good  progress this session, goals progressing with 2/3 met this session.    Patient continues to function below baseline with le dressing.   Contributing factors to remaining limitations include decreased functional strength and limited Lle ROM.  Next session anticipate patient to progress lower body dressing.  Occupational Therapy will continue to follow patient for duration of hospitalization.    Patient continues to benefit from continued skilled OT services: to promote return to prior level of function and safety with continuous assistance and gradual rehabilitative therapy .     PLAN  OT Treatment Plan: Compensatory technique education;Patient/Family training;Patient/Family education;Endurance training;Functional transfer training;ADL training  OT Device Recommendations: None    SUBJECTIVE  \"I live in an old house that needs a lot of work\"    OBJECTIVE  Precautions: Limb alert - left    WEIGHT BEARING RESTRICTION  L Lower Extremity: Weight Bearing as Tolerated    PAIN ASSESSMENT  Rating: -- (pt offering no c/o pain)  Location: -- (L hip)    ACTIVITY TOLERANCE  good    O2 SATURATIONS  Activity on room air    ACTIVITIES OF DAILY LIVING ASSESSMENT  AM-PAC ‘6-Clicks’ Inpatient Daily Activity Short Form  How much help from another person does the patient currently need…  -   Putting on and taking off regular lower body clothing?: A Little  -   Bathing (including washing, rinsing, drying)?: A Little  -   Toileting, which includes using toilet, bedpan or urinal? : A Little  -   Putting on and taking off regular upper body clothing?: None  -   Taking care of personal grooming such as brushing teeth?: None  -   Eating meals?: None    AM-PAC Score:  Score: 21  Approx Degree of Impairment:  32.79%  Standardized Score (AM-PAC Scale): 44.27  CMS Modifier (G-Code): CJ    FUNCTIONAL ADL ASSESSMENT  Eating: independent  Grooming: setup assist  UB Dressing: setup assist  LB Dressing: min assist  Toileting: independent    Skilled Therapy Provided: RN contacted prior to start of care. Treatment coordinated w/ PT. Pt agreeable to participation in therapy. Pt received up in chair, alert and oriented. Pt currently performing sit<>stand transfers w/ supervision. Pt ambulating in the [place] w/ rw and supervision. Pt reports completing grooming task at sink level w/ set up. Pt completing tolieting task w/ mod I.Le dressing strategies reviewed. Pt currently requires min a to manage le dressing tasks. Pt familiar w/ techniques from prior surgery and has ae in place at home      At end of session pt returned to chair and left w/ all needs in reach. RN aware of pt's status and performance in therapy      EDUCATION PROVIDED  Patient: Plan of Care; Functional Transfer Techniques; Fall Prevention; Surgical Precautions; Compensatory ADL Techniques; Energy Conservation  Patient's Response to Education: Verbalized Understanding    The patient's Approx Degree of Impairment: 32.79% has been calculated based on documentation in the [facility] '6 clicks' Inpatient Daily Activity Short Form.  Research supports that patients with this level of impairment may benefit from return to home w/ HH.  Final disposition will be made by interdisciplinary medical team.    Patient End of Session: Up in chair;Needs met;Call light within reach;RN aware of session/findings;All patient questions and concerns addressed    OT Goals:       Patient will complete LE dressing with supervision  Comment: pt required min a    Patient will complete toilet transfer with supervision  Comment: Goal Met    Patient will complete self care task at sink level with supervision   Comment:Goal Met             Goals  on:24  Frequency:3x/week    Therapeutic  Activity: 15 minutes

## 2024-05-17 VITALS
OXYGEN SATURATION: 95 % | SYSTOLIC BLOOD PRESSURE: 124 MMHG | HEART RATE: 78 BPM | WEIGHT: 194 LBS | BODY MASS INDEX: 36.63 KG/M2 | HEIGHT: 61 IN | DIASTOLIC BLOOD PRESSURE: 76 MMHG | TEMPERATURE: 98 F | RESPIRATION RATE: 18 BRPM

## 2024-05-17 PROCEDURE — 94799 UNLISTED PULMONARY SVC/PX: CPT

## 2024-05-17 RX ORDER — DOXYCYCLINE HYCLATE 100 MG/1
100 CAPSULE ORAL 2 TIMES DAILY
Qty: 1 CAPSULE | Refills: 0 | Status: SHIPPED | OUTPATIENT
Start: 2024-05-17

## 2024-05-17 NOTE — DISCHARGE SUMMARY
General Medicine Discharge Summary     Patient ID:  Sharifa Flores  63 year old  8/10/1960    Admit date: 5/10/2024    Discharge date and time: 5/17/24    Attending Physician: Behery, Omar Atef, MD     Consults: IP CONSULT TO HOSPITALIST  IP CONSULT TO CASE MANAGEMENT  IP CONSULT TO RESPIRATORY CARE  IP CONSULT TO SOCIAL WORK    Primary Care Physician: Ellie North DO     Reason for admission: Osteoarthritis status post left total hip arthroplasty     Risk For Readmission: low    Discharge Diagnoses: Left Hip Osteoarthritis  See Additional Discharge Diagnoses in Hospital Course    Discharged Condition: stable    Follow-up with labs/images appointments: PCP, Ortho     Exam  Gen: No acute distress,   Neuo: alert and oriented x3  Pulm: Lungs clear, normal respiratory effort  CV: Heart with regular rate and rhythm  Abd: Abdomen soft, nontender, non-distended  MSK: left hip bandage CDI      HPI: per chart  Patient is a 63 year old female with PMH breast cancer, HLD, HTN, OA, MDD, glaucoma, hypothyroidism who presents for elective left total hip arthroplasty, patient recently had right total hip arthroplasty without complications admitted to the hospital for observation medical service was consulted for postoperative medical management.  Patient seen after procedure she is having some mild chills but otherwise feels okay denies shortness of breath nausea fevers vomiting or chest tightness.     Hospital Course:   Patient is a 63 year old female with PMH breast cancer, HLD, HTN, OA, MDD, Glaucoma, Hypothyroidism who presents for elective left total hip arthroplasty, patient recently had right total hip arthroplasty without complications admitted to the hospital for observation medical service was consulted for postoperative medical management. Plans for [facility] on dc.  Hospital course prolonged 2/2 delayed insurance authorization.      Osteoarthritis status post left total hip arthroplasty  -Pain well controlled  -PT OT  pending  -patient requests [facility]   -medically stable for discharge once location identified   -DVT proph as ordered  -Expected post op acute blood loss anemia - stable   -Post op doxy ordered  -ASA 81 mg BID   -doxy day 7 of 7, 1 tab left      DM2 without complications   -A1c 6.5  -Dr. Mendiola Discussed importance of accu-checks and normoglycemic control in post operative state for wound healing and infection prevention she understood but wants to have general diet and stop checking sugars -completed   -Hold home metformin, resume on dc      Hypertension continue home losartan      Hypothyroidism continue home Synthroid      Hyperlipidemia continue home Crestor      Glaucoma continue home eyedrops     History of breast cancer on anastrozole held for procedure continue to hold while inpatient resume when safe at discharge     Obesity recommended diet exercise and weight loss    Operative Procedures: Procedure(s) (LRB):  Left Total Hip Arthroplasty Anterior Approach (Left)     Imaging: No results found.    Disposition: [facility]    Activity: as tolerated   Diet: general   Wound Care: no needs  Code Status: Full Code  O2: no needs    Home Medication Changes: as below   All discharge medications have been reconciled with current medication list.     Med list     Medication List        START taking these medications      aspirin 81 MG Tbec  Take 1 tablet (81 mg total) by mouth in the morning and 1 tablet (81 mg total) before bedtime.     doxycycline 100 MG Caps  Commonly known as: Vibramycin  Take 1 capsule (100 mg total) by mouth 2 (two) times daily.     traMADol 50 MG Tabs  Commonly known as: Ultram  Take 1 tablet (50 mg total) by mouth every 8 (eight) hours as needed for Pain.            CONTINUE taking these medications      acetaminophen 500 MG Tabs  Commonly known as: Tylenol Extra Strength     anastrozole 1 MG Tabs tab  Commonly known as: Arimidex     CALCIUM MAGNESIUM ZINC OR     GLUCOSAMINE CHONDR COMPLEX OR      latanoprost 0.005 % Soln  Commonly known as: Xalatan     levothyroxine 88 MCG Tabs  Commonly known as: Synthroid  TAKE ONE TABLET BY MOUTH EVERY MORNING     loratadine 10 MG Tabs  Commonly known as: Claritin     losartan 50 MG Tabs  Commonly known as: Cozaar     metFORMIN  MG Tb24  Commonly known as: Glucophage XR  Take 1 tablet (500 mg total) by mouth daily with breakfast.     Multi-Vitamin/Minerals Tabs     mupirocin 2 % Oint  Commonly known as: Bactroban     rosuvastatin 20 MG Tabs  Commonly known as: Crestor  Take 1 tablet (20 mg total) by mouth nightly. 1 tablet nightly     timolol 0.5 % Soln  Commonly known as: Timoptic     venlafaxine  MG Cp24  Commonly known as: Effexor-XR  Take 1 capsule (150 mg total) by mouth daily.     VITAMIN B COMPLEX OR     Vitamin C 500 MG Tabs  Commonly known as: VITAMIN C     VITAMIN D-3 OR               Where to Get Your Medications        These medications were sent to [id number] - [place], [place] - [address] [phone], [phone]  [address]  [place] [address]      Phone: [phone]   aspirin 81 MG Tbec  doxycycline 100 MG Caps       You can get these medications from any pharmacy    Bring a paper prescription for each of these medications  traMADol 50 MG Tabs         FU   Follow-up Information       Ellie Nroth DO Follow up in 2 week(s).    Specialty: Family Practice  Contact information:  [address]  [address]  [facility] [address] [phone]               Behery, Omar Atef, MD Follow up in 2 week(s).    Specialties: Surgery, Orthopaedic, SURGERY, ORTHOPEDIC  Contact information:  [address]  [facility] [address]  [phone]                             DC instructions:      Other Discharge Instructions:         DISCHARGE INSTRUCTIONS:  PLACE ICE TO SURGICAL AREA 20-30 MINUTES ON/ 20-30 MINUTES OFF. THIS IS TO HELP WITH PAIN & SWELLING.    TAKE YOUR MEDICATIONS AS PRESCRIBED BY YOUR DOCTOR BELOW.THESE HAVE BEEN  SENT TO YOUR PHARMACY.    IF YOU WERE INSTRUCTED BY PHYSICAL THERAPY TO EXERCISE HIP PRECAUTIONS, CONTINUE TO DO SO AFTER DISCHARGE    IT IS OK TO BEAR WEIGHT AS TOLERATED ON THE OPERATIVE EXTREMITY.     CALL TO CONFIRM YOUR FOLLOW UP APPOINTMENT WITH DR. BEHERY TO BE SEEN IN 2-3 WEEKS POSTOP. [phone]    MEDICATIONS    POST OP MEDICATION REGIMEN              MULTI-MODAL   PAIN REGIMEN     DRUG   FOR   FREQUENCY & DURATION   QTY   NOTES     WEANING  TIPS       Gabapentin 100mg   Nerve pain   Take 2 tabs every 8 hours for 14 days    90   No refills You may discontinue this medication prior to 14 days if you do not tolerate it.        Tylenol 500mg     Mild pain   Take 2 tabs every 6 hours     90   Can purchase over-the-counter    Stop using medication if no longer having pain.      Tramadol 50mg     Moderate pain   Take 1-2 tabs every 6 hours only as needed   45 May prescribe a refill, but ideally, this should be tapered off after surgery Stop using this medication 2nd   As pain decreases over time, increase the interval between doses (6 hours to 8, then 12, then 24) to taper off the medication.      Meloxicam 15mg     Inflammation   Take 1 tab daily for 30 days     30   May refill if needed beyond 30 days   Recommend completing the 30 day supply.           BREAKTHROUGH PAIN         Oxycodone 5mg       Severe pain     Take 1-2 tabs every 4-6 hours only as needed for severe pain       40   Take at least 1 hr apart from Tramadol.    Ideally, no refill. The goal is to taper off this medication as soon as possible, as it can be addictive and have side effects.    Stop using this medication 1st if no longer having severe pain.         BLOOD THINNER     Aspirin 81mg   Blood clot prevention   Take 1 tab twice daily for 30 days     60     No refills   Complete the entire course of your blood thinner.         ANTIBIOTIC       Cefadroxil 500mg       Infection prevention     Take 1 tab twice daily for 7 days     14     No  refills   Complete the entire course of your prophylactic antibiotic.           STOOL SOFTENER     Sennokot 8.6/50mg   Constipation   Take 1 tab twice daily  while on opioids     60 Refer to discharge instructions if constipation persists even after taking this medication   Stop taking if having diarrhea or loose stools.           ANTI-NAUSEA   Ondansetron 4mg   Nausea   Take 1 tab every 8 hours as needed if nauseous     20   No Refill      ANTI-ACID REFLUX / GASTRITIS   Pantoprazole 40mg   Acid reflux, gastric ulcer prophylaxis   Take 1 tab every day along with Meloxicam     60   May refill if Meloxicam refilled        DRESSING:    YOU HAVE A SPECIAL DRESSING CALLED AN AQUACEL DRESSING OVER YOUR INCISION.    THE DRESSING STAYS FOR 12 DAYS FROM SURGERY.    YOU MAY SHOWER AS SOON AS YOU RETURN HOME WITH THE AQUACEL DRESSING INTACT OVER YOUR INCISION AREA.    IF THE DRESSING LEAKS OR COMES LOOSE BEFORE THE 7 DAY PERIOD CONTACT YOUR DOCTOR / SURGEON.    AFTER   12 DAYS THE DRESSING IS REMOVED BY PULLING ON THE EDGE OF THE DRESSING AND GENTLY STRETCHING IT, THEN PEEL IT OFF SLOWLY LIKE A BANDAID. IF YOU HAVE ANY QUESTIONS OR CONCERNS ABOUT THE DRESSING CONTACT YOUR DOCTOR.    IF DRAINAGE IS PRESENT AT ANYTIME FROM YOUR DRESSING OR FROM YOUR INCISION CALL YOUR DOCTOR / SURGEON AS SOON AS POSSIBLE.      REASONS TO CALL YOUR DOCTOR:  TEMPERATURE .0 OR MORE  PERSISTANT NAUSEA OR VOMITTING - ESPECIALLY IF YOU ARE UNABLE TO EAT OR DRINK.  INCREASED LEVEL OF PAIN OR PAIN WHICH IS NOT CONTROLLED BY YOUR PAIN MEDICINE.  PERSISTENT DRAINAGE AT ANYTIME FROM YOUR SURGICAL AREA / INCISION.  PAIN, TENDERNESS OR SUDDEN SWELLING IN YOUR CALF REGION OF THE LOWER EXTREMITIES - THIS IS A POSSIBLE SIGN OF A BLOOD CLOT.  ANY CHANGES IN SENSATION IN YOUR BODY IN THE AREA OF YOUR SURGERY = NUMBNESS OR TINGLING.  ANY CHANGES IN CIRCULATION IN YOUR BODY IN THE AREA OF YOUR SURGERY = CHANGES  IN COLOR EITHER DARKER OR VERY PALE; OR  IF  AREA FEELS COLD TO THE TOUCH AS COMPARED TO OTHER AREAS.  ANY CHANGES IN FUNCTION IN YOUR BODY IN THE AREA OF YOUR SURGERY = CHANGE IN MOVEMENT - UNABLE TO MOVE OR WIGGLE FINGERS, TOES OR FOOT OR ANY OTHER CHANGES IN NORMAL BODY FUNCTIONING.  FOR ANY OF THE ABOVE OR ANY OTHER QUESTIONS OR CONCERNS CALL YOUR DOCTOR/ SURGEON AS SOON AS POSSIBLE.      Omar Behery, MD MPH  Orthopaedic Surgeon, Adult Hip and Knee Reconstruction  [facility] at [address], [address] [address]  Office: (P) [phone] (F) [phone]  Adminstrative Assistant: Lindsay Nobles           Patient had opportunity to ask questions and state understand and agree with therapeutic plan as outlined    Thank You,    Blas Lawrence M.D.  [facility]

## 2024-05-17 NOTE — CM/SW NOTE
05/17/24 1000   Discharge disposition   Expected discharge disposition subacute   Post Acute Care Provider Burgess Mariscal   Discharge transportation [facility]     YADIEL received confirmation of insurance authorization. YADIEL confirmed with VINCE Freire that pt is medically ready for discharge today. YADIEL discussed with liaison Carmen from [facility]    to arrange a time for discharge.  YADIEL scheduled [facility] for 12:00 PM transport.  $75 fees- pt is agreeable to fees. VINCE Hart is aware of discharge time and location and will inform patient/ family. RN to attach IP Transfer Report to After Visit Summary packet for transfer to [facility].  YADIEL confirmed PASRR screen was completed.    PLAN: DC to [facility] via [facility] at 12:00 PM .  PCS completed.    RN # to report:   [phone]     Elsy Nguyen MSW, LSW [id number]

## 2024-05-17 NOTE — BH RN DISCHARGE NOTE
Sharifa is A&Ox4 on room air. Uses CPAP to sleep. She is POD6. She is to be discharged to [facility]. Report was given to Felipe. Patient was sent with her belongings. All questions were answered.

## (undated) DEVICE — WOUND RETRACTOR AND PROTECTOR: Brand: ALEXIS O WOUND PROTECTOR-RETRACTOR

## (undated) DEVICE — PACK,UNIVERSAL,SPLIT,II: Brand: MEDLINE

## (undated) DEVICE — SOLUTION SURG DURAPREP 26ML

## (undated) DEVICE — ANTERIOR HIP: Brand: MEDLINE INDUSTRIES, INC.

## (undated) DEVICE — HANDPIECE SET WITH HIGH FLOW TIP AND SUCTION TUBE: Brand: INTERPULSE

## (undated) DEVICE — 2T11 #2 PDO 36 X 36: Brand: 2T11 #2 PDO 36 X 36

## (undated) DEVICE — SUT MCRYL 2-0 36IN CT-1 ABSRB UD L36MM TAPR P

## (undated) DEVICE — Device: Brand: STABLECUT®

## (undated) DEVICE — DRAPE SRG U 124X80IN U REINF

## (undated) DEVICE — SHEET,DRAPE,53X77,STERILE: Brand: MEDLINE

## (undated) DEVICE — NEEDLE SPINAL 18X3-1/2 PINK.

## (undated) DEVICE — HOOD: Brand: FLYTE

## (undated) DEVICE — GAMMEX® NON-LATEX PI ORTHO SIZE 8.5, STERILE POLYISOPRENE POWDER-FREE SURGICAL GLOVE: Brand: GAMMEX

## (undated) DEVICE — DERMABOND CLOSURE 0.7ML TOPICL

## (undated) DEVICE — SOLUTION IRRIG 1000ML 0.9% NACL USP BTL

## (undated) DEVICE — PACK CDS ANTERIOR HIP

## (undated) DEVICE — UNDYED BRAIDED (POLYGLACTIN 910), SYNTHETIC ABSORBABLE SUTURE: Brand: COATED VICRYL

## (undated) DEVICE — 1016 S-DRAPE IRRIG POUCH 10/BOX: Brand: STERI-DRAPE™

## (undated) DEVICE — VIOLET BRAIDED (POLYGLACTIN 910), SYNTHETIC ABSORBABLE SUTURE: Brand: COATED VICRYL

## (undated) DEVICE — GAMMEX® PI HYBRID SIZE 8, STERILE POWDER-FREE SURGICAL GLOVE, POLYISOPRENE AND NEOPRENE BLEND: Brand: GAMMEX

## (undated) DEVICE — SUT VICRYL 2-0 CT-2 J269H

## (undated) DEVICE — DRESSING SUR 9X25CM SIL SP CVR WTRPRF VIR

## (undated) DEVICE — SOL NACL IRRIG 0.9% 1000ML BTL

## (undated) DEVICE — DRAPE,SPLIT ,77X120: Brand: MEDLINE

## (undated) DEVICE — APPLICATOR PREP 26ML CHG 2% ISO ALC 70%

## (undated) DEVICE — DRESSING AQUACEL AG SP 3.5X10

## (undated) DEVICE — SOLUTION PREP 26ML 0.7% POVACRYLEX 74% ISO

## (undated) DEVICE — SYRINGE MED 20ML STD CLR PLAS LL TIP N CTRL

## (undated) DEVICE — HOOD, PEEL-AWAY: Brand: FLYTE

## (undated) DEVICE — NEEDLE SPNL 18GA L3.5IN W/ QNCKE SHARPER BVL

## (undated) DEVICE — SOLUTION  .9 3000ML

## (undated) DEVICE — NEPTUNE E-SEP SMOKE EVACUATION PENCIL, COATED, 70MM BLADE, PUSH BUTTON SWITCH: Brand: NEPTUNE E-SEP

## (undated) DEVICE — SUT ETHIBOND 2 V-37 MX69G

## (undated) DEVICE — SOLUTION IRRIG 3000ML 0.9% NACL FLX CONT

## (undated) DEVICE — INTENDED FOR TISSUE SEPARATION, AND OTHER PROCEDURES THAT REQUIRE A SHARP SURGICAL BLADE TO PUNCTURE OR CUT.: Brand: BARD-PARKER ® STAINLESS STEEL BLADES

## (undated) DEVICE — ADHESIVE SKIN TOP FOR WND CLSR DERMBND ADV

## (undated) DEVICE — 60 ML SYRINGE CATHETER TIP: Brand: MONOJECT

## (undated) DEVICE — SUT ETHBND XL 2 30IN V-37 NABSRB GRN 40MM 1/2

## (undated) DEVICE — SUT MCRYL 3-0 27IN ABSRB UD L24MM PS-1

## (undated) DEVICE — SYSTEM GRIPPER SELF RETRACTING

## (undated) DEVICE — APPLICATOR CHLORAPREP 26ML

## (undated) DEVICE — YANKAUER,FLEXIBLE HANDLE,REGLR CAPACITY: Brand: MEDLINE INDUSTRIES, INC.

## (undated) DEVICE — SYRINGE MNJCT 35ML LF STRL LL

## (undated) DEVICE — DRAPE PACK CHEST & U BAR

## (undated) NOTE — IP AVS SNAPSHOT
Patient Demographics     Address  [place] [address] Phone  [phone] (Home)  [phone] (Mobile) *Preferred*      Patient Contacts     Name Relation Home Work Mobile    PRECIOUS HALE  [phone]    TAMMI ADRIAN [phone]      Allergies as of 5/17/2024  Review status set to In Progress on 5/10/2024       Noted Reaction Type Reactions    DELETED: Acetaminophen-codeine 06/02/2017    PAIN    duplicate    Codeine 10/19/2015    PAIN    headache    Oxycontin [oxycodone] 05/07/2024   Intolerance NAUSEA AND VOMITING    Severe n/v per pt    Penicillins 10/19/2015    UNKNOWN    Brother with severe reactions, pt unable to remember if she had any    Soy Allergy 05/07/2024   Intolerance DIARRHEA    Tegaderm Ag Mesh 2\"x2\" [wound Dressings] 08/11/2017    RASH    Long term exposure      Code Status Information     Code Status    Full Code        Patient Instructions       DISCHARGE INSTRUCTIONS:  PLACE ICE TO SURGICAL AREA 20-30 MINUTES ON/ 20-30 MINUTES OFF. THIS IS TO HELP WITH PAIN & SWELLING.    TAKE YOUR MEDICATIONS AS PRESCRIBED BY YOUR DOCTOR BELOW.THESE HAVE BEEN SENT TO YOUR PHARMACY.    IF YOU WERE INSTRUCTED BY PHYSICAL THERAPY TO EXERCISE HIP PRECAUTIONS, CONTINUE TO DO SO AFTER DISCHARGE    IT IS OK TO BEAR WEIGHT AS TOLERATED ON THE OPERATIVE EXTREMITY.     CALL TO CONFIRM YOUR FOLLOW UP APPOINTMENT WITH DR. BEHERY TO BE SEEN IN 2-3 WEEKS POSTOP. [phone]    MEDICATIONS    POST OP MEDICATION REGIMEN              MULTI-MODAL   PAIN REGIMEN     DRUG   FOR   FREQUENCY & DURATION   QTY   NOTES     WEANING  TIPS       Gabapentin 100mg   Nerve pain   Take 2 tabs every 8 hours for 14 days    90   No refills You may discontinue this medication prior to 14 days if you do not tolerate it.        Tylenol 500mg     Mild pain   Take 2 tabs every 6 hours     90   Can purchase over-the-counter    Stop using medication if no longer having pain.      Tramadol  50mg     Moderate pain   Take 1-2 tabs every 6 hours only as needed   45 May prescribe a refill, but ideally, this should be tapered off after surgery Stop using this medication 2nd   As pain decreases over time, increase the interval between doses (6 hours to 8, then 12, then 24) to taper off the medication.      Meloxicam 15mg     Inflammation   Take 1 tab daily for 30 days     30   May refill if needed beyond 30 days   Recommend completing the 30 day supply.           BREAKTHROUGH PAIN         Oxycodone 5mg       Severe pain     Take 1-2 tabs every 4-6 hours only as needed for severe pain       40   Take at least 1 hr apart from Tramadol.    Ideally, no refill. The goal is to taper off this medication as soon as possible, as it can be addictive and have side effects.    Stop using this medication 1st if no longer having severe pain.         BLOOD THINNER     Aspirin 81mg   Blood clot prevention   Take 1 tab twice daily for 30 days     60     No refills   Complete the entire course of your blood thinner.         ANTIBIOTIC       Cefadroxil 500mg       Infection prevention     Take 1 tab twice daily for 7 days     14     No refills   Complete the entire course of your prophylactic antibiotic.           STOOL SOFTENER     Sennokot 8.6/50mg   Constipation   Take 1 tab twice daily  while on opioids     60 Refer to discharge instructions if constipation persists even after taking this medication   Stop taking if having diarrhea or loose stools.           ANTI-NAUSEA   Ondansetron 4mg   Nausea   Take 1 tab every 8 hours as needed if nauseous     20   No Refill      ANTI-ACID REFLUX / GASTRITIS   Pantoprazole 40mg   Acid reflux, gastric ulcer prophylaxis   Take 1 tab every day along with Meloxicam     60   May refill if Meloxicam refilled        DRESSING:    YOU HAVE A SPECIAL DRESSING CALLED AN AQUACEL DRESSING OVER YOUR INCISION.    THE DRESSING STAYS FOR 12 DAYS FROM SURGERY.    YOU MAY SHOWER AS SOON AS YOU RETURN  HOME WITH THE AQUACEL DRESSING INTACT OVER YOUR INCISION AREA.    IF THE DRESSING LEAKS OR COMES LOOSE BEFORE THE 7 DAY PERIOD CONTACT YOUR DOCTOR / SURGEON.    AFTER   12 DAYS THE DRESSING IS REMOVED BY PULLING ON THE EDGE OF THE DRESSING AND GENTLY STRETCHING IT, THEN PEEL IT OFF SLOWLY LIKE A BANDAID. IF YOU HAVE ANY QUESTIONS OR CONCERNS ABOUT THE DRESSING CONTACT YOUR DOCTOR.    IF DRAINAGE IS PRESENT AT ANYTIME FROM YOUR DRESSING OR FROM YOUR INCISION CALL YOUR DOCTOR / SURGEON AS SOON AS POSSIBLE.      REASONS TO CALL YOUR DOCTOR:  TEMPERATURE .0 OR MORE  PERSISTANT NAUSEA OR VOMITTING - ESPECIALLY IF YOU ARE UNABLE TO EAT OR DRINK.  INCREASED LEVEL OF PAIN OR PAIN WHICH IS NOT CONTROLLED BY YOUR PAIN MEDICINE.  PERSISTENT DRAINAGE AT ANYTIME FROM YOUR SURGICAL AREA / INCISION.  PAIN, TENDERNESS OR SUDDEN SWELLING IN YOUR CALF REGION OF THE LOWER EXTREMITIES - THIS IS A POSSIBLE SIGN OF A BLOOD CLOT.  ANY CHANGES IN SENSATION IN YOUR BODY IN THE AREA OF YOUR SURGERY = NUMBNESS OR TINGLING.  ANY CHANGES IN CIRCULATION IN YOUR BODY IN THE AREA OF YOUR SURGERY = CHANGES  IN COLOR EITHER DARKER OR VERY PALE; OR  IF AREA FEELS COLD TO THE TOUCH AS COMPARED TO OTHER AREAS.  ANY CHANGES IN FUNCTION IN YOUR BODY IN THE AREA OF YOUR SURGERY = CHANGE IN MOVEMENT - UNABLE TO MOVE OR WIGGLE FINGERS, TOES OR FOOT OR ANY OTHER CHANGES IN NORMAL BODY FUNCTIONING.  FOR ANY OF THE ABOVE OR ANY OTHER QUESTIONS OR CONCERNS CALL YOUR DOCTOR/ SURGEON AS SOON AS POSSIBLE.      Omar Behery, MD MPH  Orthopaedic Surgeon, Adult Hip and Knee Reconstruction  [facility] at [address], [address] [address]  Office: (P) [phone] (F) [phone]  Adminstrative Assistant: Lindsay Nobles      Follow-up Information     Ellie North DO Follow up in 2 week(s).    Specialty: Family Practice  Contact information:  [address] [address] [phone]             Behery, Omar Atef, MD  Follow up in 2 week(s).    Specialties: Surgery, Orthopaedic, SURGERY, ORTHOPEDIC  Contact information:  [address] [facility] [address]  [facility] [address]  [phone]                        Your Home Meds List      TAKE these medications       Instructions Authorizing Provider Morning Afternoon Evening As Needed   acetaminophen 500 MG Tabs  Commonly known as: Tylenol Extra Strength      Take 2 tablets (1,000 mg total) by mouth every 6 (six) hours as needed for Pain.          anastrozole 1 MG Tabs tab  Commonly known as: Arimidex  Next dose due: Tomorrow morning      Take 1 tablet (1 mg total) by mouth daily.          aspirin 81 MG Tbec  Next dose due: TONIGHT       Take 1 tablet (81 mg total) by mouth in the morning and 1 tablet (81 mg total) before bedtime.   Blas Melinda         CALCIUM MAGNESIUM ZINC OR  Next dose due: Tomorrow morning      Take 1 tablet by mouth daily.          doxycycline 100 MG Caps  Commonly known as: Vibramycin  Next dose due: tonight      Take 1 capsule (100 mg total) by mouth 2 (two) times daily.   Blas Melinda         GLUCOSAMINE CHONDR COMPLEX OR  Next dose due: Tomorrow morning      Take 1 tablet by mouth daily.          latanoprost 0.005 % Soln  Commonly known as: Xalatan  Next dose due: tonight      Place 1 drop into both eyes nightly.          levothyroxine 88 MCG Tabs  Commonly known as: Synthroid  Next dose due: Tomorrow morning      TAKE ONE TABLET BY MOUTH EVERY MORNING   Ellie North         loratadine 10 MG Tabs  Commonly known as: Claritin      Take 1 tablet (10 mg total) by mouth as needed for Allergies.          losartan 50 MG Tabs  Commonly known as: Cozaar  Next dose due: Tomorrow morning      Take 1 tablet (50 mg total) by mouth.          metFORMIN  MG Tb24  Commonly known as: Glucophage XR  Next dose due: Tomorrow morning      Take 1 tablet (500 mg total) by mouth daily with breakfast.   Ellie North         Multi-Vitamin/Minerals Tabs  Next dose due:  Tomorrow morning      Take 1 tablet by mouth daily.          mupirocin 2 % Oint  Commonly known as: Bactroban  Next dose due: As directed      Apply topically 2 (two) times daily. To bilateral nares as ordered by surgeon          rosuvastatin 20 MG Tabs  Commonly known as: Crestor  Next dose due: tonight      Take 1 tablet (20 mg total) by mouth nightly. 1 tablet nightly   Ellie North         timolol 0.5 % Soln  Commonly known as: Timoptic  Next dose due: Tomorrow morning      Place 1 drop into both eyes daily. 1 drop left eye in the am          traMADol 50 MG Tabs  Commonly known as: Ultram  Notes to patient: Last dose given at 12pm before dc.      Take 1 tablet (50 mg total) by mouth every 8 (eight) hours as needed for Pain.   Blas Lawrence         venlafaxine  MG Cp24  Commonly known as: Effexor-XR  Next dose due: Tomorrow morning      Take 1 capsule (150 mg total) by mouth daily.   Ellie North         VITAMIN B COMPLEX OR  Next dose due: Tomorrow morning      Take 1 tablet by mouth daily.          Vitamin C 500 MG Tabs  Commonly known as: VITAMIN C  Next dose due: Tomorrow morning      Take 1 tablet (500 mg total) by mouth daily.          VITAMIN D-3 OR  Next dose due: Tomorrow morning      Take by mouth. 1000 units daily                Where to Get Your Medications      These medications were sent to [id number] - [place], [place] - [address] [phone], [phone]  [address] [place] [address]    Phone: [phone]   aspirin 81 MG Tbec  doxycycline 100 MG Caps     Please  your prescriptions at the location directed by your doctor or nurse    Bring a paper prescription for each of these medications  traMADol 50 MG Tabs           [phone] - MAR ACTION REPORT  (last 48 hrs)    ** SITE UNKNOWN **     Order ID Medication Name Action Time Action Reason Comments    [id number] acetaminophen (Tylenol Extra Strength) tab 1,000 mg 05/15/24 1525 Given      [id number] acetaminophen (Tylenol Extra  Strength) tab 1,000 mg 05/15/24 2054 Given      [id number] acetaminophen (Tylenol Extra Strength) tab 1,000 mg 05/16/24 0819 Given      [id number] acetaminophen (Tylenol Extra Strength) tab 1,000 mg 05/16/24 1446 Given      [id number] acetaminophen (Tylenol Extra Strength) tab 1,000 mg 05/17/24 0901 Given      [id number] aspirin DR tab 81 mg 05/15/24 2054 Given      [id number] aspirin DR tab 81 mg 05/16/24 0820 Given      [id number] aspirin DR tab 81 mg 05/16/24 2230 Given      [id number] aspirin DR tab 81 mg 05/17/24 0901 Given      [id number] cetirizine (ZyrTEC) tab 10 mg 05/16/24 0821 Given      [id number] cetirizine (ZyrTEC) tab 10 mg 05/17/24 1016 Given      [id number] docusate sodium (Colace) cap 100 mg 05/16/24 0819 Given      [id number] docusate sodium (Colace) cap 100 mg 05/17/24 1016 Given      [id number] doxycycline (Vibramycin) cap 100 mg 05/15/24 2054 Given      [id number] doxycycline (Vibramycin) cap 100 mg 05/16/24 0820 Given      [id number] doxycycline (Vibramycin) cap 100 mg 05/16/24 2231 Given      [id number] doxycycline (Vibramycin) cap 100 mg 05/17/24 1016 Given      [id number] famotidine (Pepcid) tab 20 mg (Or Linked Group #1) 05/15/24 2054 Given      [id number] famotidine (Pepcid) tab 20 mg 05/16/24 0821 Given      [id number] famotidine (Pepcid) tab 20 mg 05/16/24 2231 Given      [id number] famotidine (Pepcid) tab 20 mg 05/17/24 0902 Given      [id number] latanoprost (Xalatan) 0.005 % ophthalmic solution 1 drop 05/15/24 2055 Given      [id number] latanoprost (Xalatan) 0.005 % ophthalmic solution 1 drop 05/16/24 2253 Given      [id number] levothyroxine (Synthroid) tab 88 mcg 05/16/24 0820 Given      [id number] levothyroxine (Synthroid) tab 88 mcg 05/17/24 0532 Given      [id number] losartan (Cozaar) tab 50 mg 05/16/24 0820 Given      [id number] losartan (Cozaar) tab 50 mg 05/17/24 0901 Given      [id number] multivitamin with minerals (Thera M Plus) tab 1 tablet 05/16/24 0820 Given      [id number] multivitamin with  minerals (Thera M Plus) tab 1 tablet 05/17/24 1016 Given      [id number] rosuvastatin (Crestor) tab 20 mg 05/15/24 2054 Given      [id number] rosuvastatin (Crestor) tab 20 mg 05/16/24 2229 Given      [id number] timolol (Timoptic) 0.5 % ophthalmic solution 1 drop 05/16/24 0821 Given      [id number] timolol (Timoptic) 0.5 % ophthalmic solution 1 drop 05/17/24 1016 Given      [id number] traMADol (Ultram) tab 50 mg 05/15/24 1200 Given      [id number] traMADol (Ultram) tab 50 mg 05/15/24 1730 Given      [id number] traMADol (Ultram) tab 50 mg 05/16/24 1141 Given      [id number] traMADol (Ultram) tab 50 mg 05/16/24 1831 Given      [id number] traMADol (Ultram) tab 50 mg 05/16/24 2230 Given      [id number] traMADol (Ultram) tab 50 mg 05/17/24 0531 Given      [id number] venlafaxine ER (Effexor-XR) 24 hr cap 150 mg 05/16/24 0820 Given      [id number] venlafaxine ER (Effexor-XR) 24 hr cap 150 mg 05/17/24 1016 Given              Recent Vital Signs    Flowsheet Row Most Recent Value   /76 Filed at 05/17/2024 0807   Pulse 78 Filed at 05/17/2024 0807   Resp 18 Filed at 05/17/2024 0807   Temp 97.6 °F (36.4 °C) Filed at 05/17/2024 0807   SpO2 95 % Filed at 05/17/2024 0807      Patient's Most Recent Weight    Flowsheet Row Most Recent Value   Patient Weight 88 kg (194 lb)      CPAP Settings (Inpatient)    Flowsheet Row Most Recent Value   Mode AutoPAP   Interface Nasal mask   Mask size Small   O2% 21 %      Lab Results Last 24 Hours    No matching results found     Microbiology Results (All)     None         H&P - H&P Note      H&P signed by Flaco Mendiola DO at 5/10/2024  3:19 PM  Version 1 of 1    Author: Flaco Mendiola DO Service: Internal Medicine Author Type: Physician    Filed: 5/10/2024  3:19 PM Date of Service: 5/10/2024  3:11 PM Status: Signed    : Flaco Mendiola DO (Physician)         [facility]       CC: Postoperative medical management       PCP: Ellie North DO    History of Present Illness:  Patient is a 63 year old female with PMH breast cancer, HLD, HTN, OA, MDD, glaucoma, hypothyroidism who presents for elective left total hip arthroplasty, patient recently had right total hip arthroplasty without complications admitted to the hospital for observation medical service was consulted for postoperative medical management.  Patient seen after procedure she is having some mild chills but otherwise feels okay denies shortness of breath nausea fevers vomiting or chest tightness.     PMH  Past Medical History:    Back problem    Breast cancer (HCC)    left side    Depression    Diabetes (HCC)    Disorder of thyroid    Essential hypertension    Exposure to medical diagnostic radiation    Glaucoma    High blood pressure    High cholesterol    Hyperthyroidism    IBS (irritable bowel syndrome)    diarrhea    Incontinence    bladder    Mixed hyperlipidemia    Osteoarthritis    Palpitation    Personal history of antineoplastic chemotherapy    PONV (postoperative nausea and vomiting)    Precordial pain    Sleep apnea    CPAP    Visual impairment    readers        PSH  Past Surgical History:   Procedure Laterality Date    Breast biopsy Left     benign    Cholecystectomy      Colonoscopy      Mastectomy left      Mastectomy modified radical Bilateral     Mastectomy right      Other surgical history      port was removed.    Total hip replacement Right 08/04/2023    Right Total Hip Arthroplasty        ALL:  Allergies   Allergen Reactions    Codeine PAIN     headache    Oxycontin [Oxycodone] NAUSEA AND VOMITING     Severe n/v per pt    Penicillins UNKNOWN     Brother with severe reactions, pt unable to remember if she had any    Soy Allergy DIARRHEA    Tegaderm Ag Mesh 2\"X2\" [Wound Dressings] RASH     Long term exposure        Home Medications:  Outpatient Medications Marked as Taking for the 5/10/24 encounter (Hospital Encounter)   Medication Sig Dispense Refill    mupirocin 2 % External Ointment Apply topically 2  (two) times daily. To bilateral nares as ordered by surgeon      Vitamin C 500 MG Oral Tab Take 1 tablet (500 mg total) by mouth daily.      acetaminophen 500 MG Oral Tab Take 2 tablets (1,000 mg total) by mouth every 6 (six) hours as needed for Pain.      CALCIUM MAGNESIUM ZINC OR Take 1 tablet by mouth daily.      venlafaxine  MG Oral Capsule SR 24 Hr Take 1 capsule (150 mg total) by mouth daily. 90 capsule 0    LEVOTHYROXINE 88 MCG Oral Tab TAKE ONE TABLET BY MOUTH EVERY MORNING 90 tablet 0    timolol 0.5 % Ophthalmic Solution Place 1 drop into both eyes daily. 1 drop left eye in the am      rosuvastatin 20 MG Oral Tab Take 1 tablet (20 mg total) by mouth nightly. 1 tablet nightly 90 tablet 1    Cholecalciferol (VITAMIN D-3 OR) Take by mouth. 1000 units daily      Multiple Vitamins-Minerals (MULTI-VITAMIN/MINERALS) Oral Tab Take 1 tablet by mouth daily.      B Complex Vitamins (VITAMIN B COMPLEX OR) Take 1 tablet by mouth daily.      metFORMIN HCl  MG Oral Tablet 24 Hr Take 1 tablet (500 mg total) by mouth daily with breakfast. 180 tablet 1    latanoprost 0.005 % Ophthalmic Solution Place 1 drop into both eyes nightly.      anastrozole 1 MG Oral Tab tab Take 1 tablet (1 mg total) by mouth daily.      losartan Potassium 50 MG Oral Tab Take 1 tablet (50 mg total) by mouth.      Glucosamine-Chondroitin (GLUCOSAMINE CHONDR COMPLEX OR) Take 1 tablet by mouth daily.           Soc Hx  Social History     Tobacco Use    Smoking status: Never    Smokeless tobacco: Never   Substance Use Topics    Alcohol use: Not Currently     Comment: occasional        Fam Hx  Family History   Problem Relation Age of Onset    Hypertension Father     Heart Disorder Father     Cancer Mother         breast    Other (Other[other]) Mother         glaucoma    Hypertension Maternal Grandmother     Other (Other[other]) Maternal Grandfather         glaucoma    Diabetes Brother        Review of Systems  Comprehensive ROS reviewed and  negative except for what's stated above.     OBJECTIVE:  /63   Pulse 73   Temp 97 °F (36.1 °C)   Resp 16   Ht 5' 1\" (1.549 m)   Wt 194 lb (88 kg)   SpO2 99%   BMI 36.66 kg/m²   General:  Alert, no distress   Head:  Normocephalic   Eyes:  Sclera anicteric   Nose: Nares normal. Septum midline   Throat: Lips, mucosa, and tongue normal.    Neck: Supple, symmetrical, trachea midline   Lungs:   Clear to auscultation bilaterally. Normal effort   Chest wall:  No tenderness or deformity.   Heart:  Regular rate and rhythm, S1, S2 normal,   Abdomen:   Large ventral hernia soft reducible positive bowel sounds   Extremities: Surgical bandage over the left hip, bandage clean dry and intact   Skin: Skin color, texture, turgor normal.    Neurologic: Normal strength, no focal deficit appreciated     Diagnostic Data:    CBC/Chem  No results for input(s): \"WBC\", \"HGB\", \"MCV\", \"PLT\", \"BAND\", \"INR\" in the last 168 hours.    Invalid input(s): \"LYM#\", \"MONO#\", \"BASOS#\", \"EOSIN#\"    No results for input(s): \"NA\", \"K\", \"CL\", \"CO2\", \"BUN\", \"CREATSERUM\", \"GLU\", \"CA\", \"CAION\", \"MG\", \"PHOS\" in the last 168 hours.    No results for input(s): \"ALT\", \"AST\", \"ALB\", \"AMYLASE\", \"LIPASE\", \"LDH\" in the last 168 hours.    Invalid input(s): \"ALPHOS\", \"TBIL\", \"DBIL\", \"TPROT\"    No results for input(s): \"TROP\" in the last 168 hours.        Radiology: XR FLUOROSCOPY C-ARM TIME LESS THAN 1 HOUR (CPT=76000)    Result Date: 5/10/2024  PROCEDURE: XR FLUORO PHYSICIAN TIME< 1 HOUR (CPT=76000)  COMPARISON: [facility], XR PELVIS (1 VIEW) (CPT=72170), 8/04/2023, 2:19 PM.  [facility], XR FLUOROSCOPY C-ARM  TIME< 1 HOUR (CPT=76000), 8/04/2023, 1:00 PM.  INDICATIONS: Left hip joint osteoarthritis.  TECHNIQUE: Intraoperative fluoroscopy was provided during performance of a left total hip arthroplasty.   FLUOROSCOPY IMAGES OBTAINED:  1 (Dr. Omar Behery) FLUOROSCOPY TIME:  17.2 seconds RADIATION DOSE (Dose Area Product):   [id number]*m^[id number]    Dictated by (CST): Tapan Zuluaga MD on 5/10/2024 at 2:56 PM     Finalized by (CST): Tapan Zuluaga MD on 5/10/2024 at 2:57 PM             ASSESSMENT / PLAN:   Patient is a 63 year old female with PMH breast cancer, HLD, HTN, OA, MDD, glaucoma, hypothyroidism who presents for elective left total hip arthroplasty, patient recently had right total hip arthroplasty without complications admitted to the hospital for observation medical service was consulted for postoperative medical management.       Osteoarthritis status post left total hip arthroplasty  -Postoperative pain management, anticoagulation IV fluids per surgical service  -PT OT as ordered  -Patient lives alone so plan is to go to subacute rehab    2.  Prediabetes  -Hold home metformin  Low-dose correction factor insulin    3.  Hypertension continue home losartan dose in the morning    4.  Hypothyroidism continue home Synthroid patient did not take this this morning restart tomorrow      5.  Hyperlipidemia continue home Crestor dose this evening    6.  Glaucoma continue home eyedrops    7.  History of breast cancer on anastrozole held for procedure continue to hold while inpatient resume when safe at discharge    8.  Obesity recommended diet exercise and weight loss    FN:  - IVF:Per ortho   - Diet:PIV     DVT Prophy: ASA BID as ordered   Lines:PIV    FULL CODE confirmed on admission     Dispo: pending clinical course    Outpatient records or previous hospital records reviewed.     Further recommendations pending patient's clinical course.  [facility] hospitalist to continue to follow patient while in house    Patient and/or patient's family given opportunity to ask questions and note understanding and agreeing with therapeutic plan as outlined    Thank You,  Flaco Mendiola DO    Hospitalist with [facility] Service number: [phone]      Electronically signed by Flaco Mendiola DO on 5/10/2024  3:19 PM           D/C  Summary    No notes of this type exist for this encounter.        Physical Therapy Notes (last 72 hours)      Physical Therapy Note signed by Priscilla Chris PT at 5/16/2024  3:54 PM  Version 1 of 1    Author: Priscilla Chris PT Service: Rehab Author Type: Physical Therapist    Filed: 5/16/2024  3:54 PM Date of Service: 5/16/2024  3:50 PM Status: Signed    : Priscilla Chris PT (Physical Therapist)       PHYSICAL THERAPY TREATMENT NOTE - INPATIENT     Room Number: 416/416-A       Presenting Problem: L PRAFUL  Co-Morbidities : h/o R PRAFUL '23    Problem List  Active Problems:    * No active hospital problems. *      PHYSICAL THERAPY ASSESSMENT   Patient demonstrates good  progress this session, goals  remain in progress.    Patient continues to function below baseline with bed mobility, transfers, gait, and stair negotiation.  Contributing factors to remaining limitations include decreased functional strength, pain, impaired dynamic balance, and limited Lhip ROM.  Next session anticipate patient to progress bed mobility, transfers, and gait.  Physical Therapy will continue to follow patient for duration of hospitalization.    Patient continues to benefit from continued skilled PT services: at discharge to promote functional independence and safety with additional support and return home with home health PT.    PLAN  PT Treatment Plan: Bed mobility;Body mechanics;Endurance;Gait training  Frequency (Obs): Daily    SUBJECTIVE  Agreeable to therapy     OBJECTIVE  Precautions: Limb alert - left    WEIGHT BEARING RESTRICTION           L Lower Extremity: Weight Bearing as Tolerated    PAIN ASSESSMENT   Rating:  (did not quantify)  Location: L hip  Management Techniques: Activity promotion    BALANCE  Static Sitting: Good  Dynamic Sitting: Good  Static Standing: Fair +  Dynamic Standing: Fair        AM-PAC '6-Clicks' INPATIENT SHORT FORM - BASIC MOBILITY  How much difficulty does the patient currently have...  Patient  Difficulty: Turning over in bed (including adjusting bedclothes, sheets and blankets)?: A Little   Patient Difficulty: Sitting down on and standing up from a chair with arms (e.g., wheelchair, bedside commode, etc.): A Little   Patient Difficulty: Moving from lying on back to sitting on the side of the bed?: A Little   How much help from another person does the patient currently need...   Help from Another: Moving to and from a bed to a chair (including a wheelchair)?: A Little   Help from Another: Need to walk in hospital room?: A Little   Help from Another: Climbing 3-5 steps with a railing?: A [place]     AM-PAC Score:  Raw Score: 18   Approx Degree of Impairment: 46.58%   Standardized Score (AM-PAC Scale): 43.63   CMS Modifier (G-Code): CK    FUNCTIONAL ABILITY STATUS  Functional Mobility/Gait Assessment  Gait Assistance:  (SBA)  Distance (ft): 100 ft x2  Assistive Device: Rolling walker  Pattern: L Decreased stance time (decreased step length, slow pace, step through)  Stairs:  (pt refusing trial this session)  Sit to Stand: contact guard assist with RW    Additional information: Pt agreeable to therapy, identified by name and , gait belt donned for mobility. Skilled therapy provided: transfers and gait training with RW.     The patient's Approx Degree of Impairment: 46.58% has been calculated based on documentation in the [facility] '6 clicks' Inpatient Daily Activity Short Form.  Research supports that patients with this level of impairment may benefit from HHPT.  Final disposition will be made by interdisciplinary medical team.        Patient End of Session: Up in chair;Needs met;Call light within reach;RN aware of session/findings    CURRENT GOALS   Patient Goal Patient's self-stated goal is: to go to rehab   Goal #1 Patient is able to demonstrate supine - sit EOB @ level: independent      Goal #1   Current Status Min a   Goal #2 Patient is able to demonstrate transfers Sit to/from Stand at assistance level:  supervision with walker - rolling      Goal #2  Current Status CGA with RW   Goal #3 Patient is able to ambulate 500 feet with assist device: walker - rolling at assistance level: supervision   Goal #3   Current Status SBA with  ftx2   Goal #4 Patient will negotiate 14 stairs/one curb w/ assistive device and supervision   Goal #4   Current Status NT   Goal #5 Patient to demonstrate independence with home activity/exercise instructions provided to patient in preparation for discharge.   Goal #5   Current Status In progress   Goal #6     Goal #6  Current Status       Gait Training: 15 minutes           Physical Therapy Note signed by Priscilla Chris PT at 5/15/2024 10:53 AM  Version 1 of 1    Author: Priscilla hCris PT Service: Rehab Author Type: Physical Therapist    Filed: 5/15/2024 10:53 AM Date of Service: 5/15/2024  9:05 AM Status: Signed    : Priscilla Chris PT (Physical Therapist)       PHYSICAL THERAPY TREATMENT NOTE - INPATIENT     Room Number: 416/416-A       Presenting Problem: L PRAFUL  Co-Morbidities : h/o R PRAFUL '23    Problem List  Active Problems:    * No active hospital problems. *      PHYSICAL THERAPY ASSESSMENT   Patient demonstrates good  progress this session, goals  remain in progress.    Patient continues to function below baseline with bed mobility, transfers, gait, and performing household tasks, requiring Minimum Assistance.  Contributing factors to remaining limitations include decreased functional strength, pain, impaired dynamic balance, decreased muscular endurance, and limited L hip ROM.  Next session anticipate patient to progress bed mobility, transfers, and gait.  Physical Therapy will continue to follow patient for duration of hospitalization.    Patient continues to benefit from continued skilled PT services: at discharge to promote functional independence and safety with additional support and return home with home health PT.    PLAN  PT Treatment Plan: Bed mobility;Body  mechanics;Endurance;Gait training  Frequency (Obs): Daily    SUBJECTIVE  Agreeable to therapy     OBJECTIVE  Precautions: Limb alert - left    WEIGHT BEARING RESTRICTION           L Lower Extremity: Weight Bearing as Tolerated    PAIN ASSESSMENT   Ratin (5/10 with activity)  Location: L hip  Management Techniques: Activity promotion;Body mechanics;Breathing techniques    BALANCE  Static Sitting: Good  Dynamic Sitting: Good  Static Standing: Fair +  Dynamic Standing: Fair        AM-PAC '6-Clicks' INPATIENT SHORT FORM - BASIC MOBILITY  How much difficulty does the patient currently have...  Patient Difficulty: Turning over in bed (including adjusting bedclothes, sheets and blankets)?: A Little   Patient Difficulty: Sitting down on and standing up from a chair with arms (e.g., wheelchair, bedside commode, etc.): A Little   Patient Difficulty: Moving from lying on back to sitting on the side of the bed?: A Little   How much help from another person does the patient currently need...   Help from Another: Moving to and from a bed to a chair (including a wheelchair)?: A Little   Help from Another: Need to walk in hospital room?: A Little   Help from Another: Climbing 3-5 steps with a railing?: A Little     AM-PAC Score:  Raw Score: 18   Approx Degree of Impairment: 46.58%   Standardized Score (AM-PAC Scale): 43.63   CMS Modifier (G-Code): CK    FUNCTIONAL ABILITY STATUS  Functional Mobility/Gait Assessment  Gait Assistance: Contact guard assist  Distance (ft): 75 ft x2  Assistive Device: Rolling walker  Pattern: L Decreased stance time (decreased step length, slow pace, rest break required 2/2 fatigue)  Stairs:  (pt refusing trial this session)  Supine to Sit: minimal assist for LLE  Sit to Supine: minimal assist for LLE  Sit to Stand: contact guard assist with RW    Skilled Therapy Provided: Pt agreeable to therapy, identified by name and , gait belt donned for mobility. Pt with most difficulty with bed mobility  this session, requiring Min A for LLE management. Pain increases with bed mobility but able to tolerate. Pt with gait progression, but only able to ambulate 75 ft x2 with rest break required 2/2 fatigue. Pt with 14 stairs at home, states she is concerned about safety if discharged home due to pain and weakness in surgical leg. Pt with impaired dynamic balance and decreased endurance, pt at risk for falls if discharged home alone. Pt in bed with needs in reach, RN aware of pt status.     The patient's Approx Degree of Impairment: 46.58% has been calculated based on documentation in the [facility] '6 clicks' Inpatient Daily Activity Short Form.  Research supports that patients with this level of impairment may benefit from HHPT.  Final disposition will be made by interdisciplinary medical team.      Patient End of Session: In bed;Needs met;RN aware of session/findings;Call light within reach    CURRENT GOALS   Patient Goal Patient's self-stated goal is: to go to rehab   Goal #1 Patient is able to demonstrate supine - sit EOB @ level: independent      Goal #1   Current Status Min a   Goal #2 Patient is able to demonstrate transfers Sit to/from Stand at assistance level: supervision with walker - rolling      Goal #2  Current Status CGA with RW   Goal #3 Patient is able to ambulate 500 feet with assist device: walker - rolling at assistance level: supervision   Goal #3   Current Status 75 ft x2 with RW, CGA   Goal #4 Patient will negotiate 14 stairs/one curb w/ assistive device and supervision   Goal #4   Current Status NT   Goal #5 Patient to demonstrate independence with home activity/exercise instructions provided to patient in preparation for discharge.   Goal #5   Current Status In progress   Goal #6     Goal #6  Current Status       Gait Training: 15 minutes  Therapeutic Activity: 8 minutes           Physical Therapy Note signed by Tyler Mahajan PTA at 5/14/2024 12:01 PM  Version 1 of 1    Author: Zaina  ADRIANA Scott Service: Rehab Author Type: Physical Therapist    Filed: 2024 12:01 PM Date of Service: 2024 11:49 AM Status: Signed    : Tyler Mahajan PTA (Physical Therapist)       PHYSICAL THERAPY TREATMENT NOTE - INPATIENT     Room Number: 416/416-A       Presenting Problem: L PRAFUL  Co-Morbidities : h/o R PRAFUL '    Problem List  Active Problems:    * No active hospital problems. *      PHYSICAL THERAPY ASSESSMENT   Patient demonstrates good  progress this session, goals  remain in progress.    Patient continues to function below baseline with bed mobility, transfers, and gait.  Contributing factors to remaining limitations include decreased functional strength, decreased endurance/aerobic capacity, and pain.  Next session anticipate patient to progress bed mobility, transfers, and gait.  Physical Therapy will continue to follow patient for duration of hospitalization.    Patient continues to benefit from continued skilled PT services: to facilitate return to prior level of function as patient demonstrates high motivation with excellent tolerance to an intensive therapy program .    PLAN  PT Treatment Plan: Bed mobility;Body mechanics;Endurance;Gait training  Frequency (Obs): Daily    SUBJECTIVE  Pt reports being ready for PT RX    OBJECTIVE  Precautions: Limb alert - left    WEIGHT BEARING RESTRICTION           L Lower Extremity: Weight Bearing as Tolerated    PAIN ASSESSMENT   Ratin  Location: denies this date       BALANCE  Static Sitting: Good  Dynamic Sitting: Good  Static Standing: Fair +  Dynamic Standing: Fair    ACTIVITY TOLERANCE                          O2 WALK       AM-PAC '6-Clicks' INPATIENT SHORT FORM - BASIC MOBILITY  How much difficulty does the patient currently have...  Patient Difficulty: Turning over in bed (including adjusting bedclothes, sheets and blankets)?: A Little   Patient Difficulty: Sitting down on and standing up from a chair with arms (e.g., wheelchair,  bedside commode, etc.): A Little   Patient Difficulty: Moving from lying on back to sitting on the side of the bed?: A Little   How much help from another person does the patient currently need...   Help from Another: Moving to and from a bed to a chair (including a wheelchair)?: A Little   Help from Another: Need to walk in hospital room?: A Little   Help from Another: Climbing 3-5 steps with a railing?: A Little     AM-PAC Score:  Raw Score: 18   Approx Degree of Impairment: 46.58%   Standardized Score (AM-PAC Scale): 43.63   CMS Modifier (G-Code): CK    FUNCTIONAL ABILITY STATUS  Functional Mobility/Gait Assessment  Gait Assistance: Minimum assistance  Distance (ft): 2 x 45  Assistive Device: Rolling walker  Pattern: L Decreased stance time  Stairs:  (pt refusing trial this session)  Rolling: minimal assist  Supine to Sit: minimal assist  Sit to Supine: minimal assist  Sit to Stand: minimal assist    Additional information: Pt seen daily.Chart reviewed;education.Min a for bed mobility and transfer.Extra time provided to complete task.EOB sitting balance activity performed to maximize safety with functional mobility.Spinal precautions reviewed;education.Pt amb 2 x 45 ft with RW and min a;one rest break provided.Gentle balance activity performed to maximize safety with functional mobility.There ex.    The patient's Approx Degree of Impairment: 46.58% has been calculated based on documentation in the [facility] '6 clicks' Inpatient Daily Activity Short Form.  Research supports that patients with this level of impairment may benefit from Acute Rehabilitation.  Final disposition will be made by interdisciplinary medical team.    THERAPEUTIC EXERCISES  Lower Extremity Ankle pumps  Glut sets  Hip AB/AD  Heel slides  Quad sets     Position Supine       Patient End of Session: Up in chair;Call light within reach;RN aware of session/findings;All patient questions and concerns addressed    CURRENT GOALS   Patient Goal Patient's  self-stated goal is: to go to rehab   Goal #1 Patient is able to demonstrate supine - sit EOB @ level: independent     Goal #1   Current Status Min a   Goal #2 Patient is able to demonstrate transfers Sit to/from Stand at assistance level: supervision with walker - rolling     Goal #2  Current Status Min a   Goal #3 Patient is able to ambulate 500 feet with assist device: walker - rolling at assistance level: supervision   Goal #3   Current Status Pt amb 2 x 45 ft with RW and min a;unsteady gait   Goal #4 Patient will negotiate 14 stairs/one curb w/ assistive device and supervision   Goal #4   Current Status NT   Goal #5 Patient to demonstrate independence with home activity/exercise instructions provided to patient in preparation for discharge.   Goal #5   Current Status In progress   Goal #6    Goal #6  Current Status      Gait Training:  minutes  Therapeutic Activity: 15 minutes  Neuromuscular Re-education:  minutes  Therapeutic Exercise: 15 minutes  Canalith Repositioning:  minutes  Manual Therapy:  minutes  Can add/delete any of these                  Occupational Therapy Notes (last 72 hours)      Occupational Therapy Note signed by Itzel Ferraro OT at 5/16/2024  4:33 PM  Version 1 of 1    Author: Itzel Ferraro OT Service: Rehab Author Type: Occupational Therapist    Filed: 5/16/2024  4:33 PM Date of Service: 5/16/2024  3:45 PM Status: Signed    : Itzel Ferraro OT (Occupational Therapist)       OCCUPATIONAL THERAPY TREATMENT NOTE - INPATIENT    Room Number: 416/416-A  Presenting Problem: L PRAFUL     Problem List  Active Problems:    * No active hospital problems. *      OCCUPATIONAL THERAPY ASSESSMENT   Patient demonstrates good  progress this session, goals progressing with 2/3 met this session.    Patient continues to function below baseline with le dressing.   Contributing factors to remaining limitations include decreased functional strength and limited Lle ROM.  Next session anticipate  patient to progress lower body dressing.  Occupational Therapy will continue to follow patient for duration of hospitalization.    Patient continues to benefit from continued skilled OT services: to promote return to prior level of function and safety with continuous assistance and gradual rehabilitative therapy .     PLAN  OT Treatment Plan: Compensatory technique education;Patient/Family training;Patient/Family education;Endurance training;Functional transfer training;ADL training  OT Device Recommendations: None    SUBJECTIVE  \"I live in an old house that needs a lot of work\"    OBJECTIVE  Precautions: Limb alert - left    WEIGHT BEARING RESTRICTION  L Lower Extremity: Weight Bearing as Tolerated    PAIN ASSESSMENT  Rating: -- (pt offering no c/o pain)  Location: -- (L hip)    ACTIVITY TOLERANCE  good    O2 SATURATIONS  Activity on room air    ACTIVITIES OF DAILY LIVING ASSESSMENT  AM-PAC ‘6-Clicks’ Inpatient Daily Activity Short Form  How much help from another person does the patient currently need…  -   Putting on and taking off regular lower body clothing?: A Little  -   Bathing (including washing, rinsing, drying)?: A Little  -   Toileting, which includes using toilet, bedpan or urinal? : A Little  -   Putting on and taking off regular upper body clothing?: None  -   Taking care of personal grooming such as brushing teeth?: None  -   Eating meals?: None    AM-PAC Score:  Score: 21  Approx Degree of Impairment: 32.79%  Standardized Score (AM-PAC Scale): 44.27  CMS Modifier (G-Code): CJ    FUNCTIONAL ADL ASSESSMENT  Eating: independent  Grooming: setup assist  UB Dressing: setup assist  LB Dressing: min assist  Toileting: independent    Skilled Therapy Provided: RN contacted prior to start of care. Treatment coordinated w/ PT. Pt agreeable to participation in therapy. Pt received up in chair, alert and oriented. Pt currently performing sit<>stand transfers w/ supervision. Pt ambulating in the [place] w/ rw and  supervision. Pt reports completing grooming task at sink level w/ set up. Pt completing tolieting task w/ mod I.Le dressing strategies reviewed. Pt currently requires min a to manage le dressing tasks. Pt familiar w/ techniques from prior surgery and has ae in place at home      At end of session pt returned to chair and left w/ all needs in reach. RN aware of pt's status and performance in therapy      EDUCATION PROVIDED  Patient: Plan of Care; Functional Transfer Techniques; Fall Prevention; Surgical Precautions; Compensatory ADL Techniques; Energy Conservation  Patient's Response to Education: Verbalized Understanding    The patient's Approx Degree of Impairment: 32.79% has been calculated based on documentation in the [facility] '6 clicks' Inpatient Daily Activity Short Form.  Research supports that patients with this level of impairment may benefit from return to home w/ HH.  Final disposition will be made by interdisciplinary medical team.    Patient End of Session: Up in chair;Needs met;Call light within reach;RN aware of session/findings;All patient questions and concerns addressed    OT Goals:       Patient will complete LE dressing with supervision  Comment: pt required min a    Patient will complete toilet transfer with supervision  Comment: Goal Met    Patient will complete self care task at sink level with supervision   Comment:Goal Met             Goals  on:24  Frequency:3x/week    Therapeutic Activity: 15 minutes               Video Swallow Study Notes    No notes of this type exist for this encounter.     SLP Notes    No notes of this type exist for this encounter.     Immunizations     Name Date      Covid-19 Moderna 21     Covid-19 Moderna 04/15/21     Depo-Medrol 40mg Inj 19     INFLUENZA 10/16/19     INFLUENZA 18     INFLUENZA 18     Methylprednisolone 80 mg 21     Pneumovax 23 20       Future Appointments        Provider Department Center    2024  1:00 PM Priscilla Chris, PT [facility] Physical Therapy [place]      Multidisciplinary Problems     Active Goals     Not on file